# Patient Record
Sex: MALE | Race: WHITE | Employment: OTHER | URBAN - METROPOLITAN AREA
[De-identification: names, ages, dates, MRNs, and addresses within clinical notes are randomized per-mention and may not be internally consistent; named-entity substitution may affect disease eponyms.]

---

## 2017-01-04 PROBLEM — R06.09 DYSPNEA ON EXERTION: Status: ACTIVE | Noted: 2017-01-04

## 2017-01-04 PROBLEM — I31.39 PERICARDIAL EFFUSION: Status: ACTIVE | Noted: 2017-01-04

## 2017-01-04 PROBLEM — J96.01 ACUTE RESPIRATORY FAILURE WITH HYPOXIA (HCC): Status: ACTIVE | Noted: 2017-01-04

## 2017-03-13 PROBLEM — J96.01 ACUTE RESPIRATORY FAILURE WITH HYPOXIA (HCC): Status: RESOLVED | Noted: 2017-01-04 | Resolved: 2017-03-13

## 2017-03-13 PROBLEM — Z79.899 ENCOUNTER FOR MONITORING SOTALOL THERAPY: Status: ACTIVE | Noted: 2017-03-13

## 2017-03-13 PROBLEM — Z51.81 ENCOUNTER FOR MONITORING SOTALOL THERAPY: Status: ACTIVE | Noted: 2017-03-13

## 2017-03-14 PROBLEM — R94.31 PROLONGED QT INTERVAL: Status: ACTIVE | Noted: 2017-03-14

## 2018-02-26 PROBLEM — R78.81 BACTEREMIA: Status: ACTIVE | Noted: 2018-02-26

## 2018-02-27 PROBLEM — I48.19 PERSISTENT ATRIAL FIBRILLATION (HCC): Status: ACTIVE | Noted: 2018-02-27

## 2018-02-27 PROBLEM — I10 ESSENTIAL HYPERTENSION: Chronic | Status: ACTIVE | Noted: 2018-02-27

## 2018-02-27 PROBLEM — R94.31 PROLONGED QT INTERVAL: Status: RESOLVED | Noted: 2017-03-14 | Resolved: 2018-02-27

## 2018-02-27 PROBLEM — I50.32 CHRONIC DIASTOLIC CONGESTIVE HEART FAILURE (HCC): Chronic | Status: ACTIVE | Noted: 2018-02-27

## 2018-02-27 PROBLEM — I48.0 PAROXYSMAL ATRIAL FIBRILLATION (HCC): Chronic | Status: ACTIVE | Noted: 2018-02-27

## 2018-02-27 PROBLEM — I31.39 PERICARDIAL EFFUSION: Status: RESOLVED | Noted: 2017-01-04 | Resolved: 2018-02-27

## 2018-02-27 PROBLEM — I49.5 SINUS NODE DYSFUNCTION (HCC): Status: ACTIVE | Noted: 2018-02-27

## 2018-02-27 PROBLEM — Z79.899 ENCOUNTER FOR MONITORING SOTALOL THERAPY: Status: RESOLVED | Noted: 2017-03-13 | Resolved: 2018-02-27

## 2018-02-27 PROBLEM — Z51.81 ENCOUNTER FOR MONITORING SOTALOL THERAPY: Status: RESOLVED | Noted: 2017-03-13 | Resolved: 2018-02-27

## 2018-02-27 PROBLEM — I34.0 MITRAL VALVE REGURGITATION: Status: ACTIVE | Noted: 2018-02-27

## 2018-03-20 ENCOUNTER — OFFICE VISIT (OUTPATIENT)
Dept: CARDIOTHORACIC SURGERY | Age: 78
End: 2018-03-20

## 2018-03-20 VITALS
SYSTOLIC BLOOD PRESSURE: 132 MMHG | HEIGHT: 73 IN | WEIGHT: 174 LBS | BODY MASS INDEX: 23.06 KG/M2 | HEART RATE: 61 BPM | DIASTOLIC BLOOD PRESSURE: 79 MMHG

## 2018-03-20 DIAGNOSIS — R78.81 BACTEREMIA: Primary | ICD-10-CM

## 2018-03-20 PROCEDURE — 99024 POSTOP FOLLOW-UP VISIT: CPT | Performed by: PHYSICIAN ASSISTANT

## 2018-05-02 ENCOUNTER — TELEPHONE (OUTPATIENT)
Dept: CARDIOTHORACIC SURGERY | Age: 78
End: 2018-05-02

## 2019-09-03 RX ORDER — SILDENAFIL 100 MG/1
100 TABLET, FILM COATED ORAL PRN
COMMUNITY
End: 2019-10-24

## 2019-09-13 ENCOUNTER — HOSPITAL ENCOUNTER (OUTPATIENT)
Age: 79
Discharge: HOME | End: 2019-09-15
Payer: COMMERCIAL

## 2019-09-13 ENCOUNTER — OFFICE VISIT (OUTPATIENT)
Dept: FAMILY MEDICINE CLINIC | Age: 79
End: 2019-09-13
Payer: COMMERCIAL

## 2019-09-13 VITALS
DIASTOLIC BLOOD PRESSURE: 68 MMHG | HEART RATE: 66 BPM | BODY MASS INDEX: 24.12 KG/M2 | HEIGHT: 73 IN | WEIGHT: 182 LBS | OXYGEN SATURATION: 97 % | SYSTOLIC BLOOD PRESSURE: 118 MMHG

## 2019-09-13 DIAGNOSIS — K80.50 PANCREATITIS DUE TO COMMON BILE DUCT STONE: ICD-10-CM

## 2019-09-13 DIAGNOSIS — Z95.0 PACEMAKER: ICD-10-CM

## 2019-09-13 DIAGNOSIS — I49.5 SINUS NODE DYSFUNCTION (HCC): ICD-10-CM

## 2019-09-13 DIAGNOSIS — I48.19 PERSISTENT ATRIAL FIBRILLATION (HCC): Primary | ICD-10-CM

## 2019-09-13 DIAGNOSIS — I10 ESSENTIAL HYPERTENSION: ICD-10-CM

## 2019-09-13 DIAGNOSIS — E78.49 OTHER HYPERLIPIDEMIA: ICD-10-CM

## 2019-09-13 DIAGNOSIS — K85.90 PANCREATITIS DUE TO COMMON BILE DUCT STONE: ICD-10-CM

## 2019-09-13 DIAGNOSIS — R97.20 PSA ELEVATION: ICD-10-CM

## 2019-09-13 DIAGNOSIS — I50.32 CHRONIC DIASTOLIC HEART FAILURE (HCC): ICD-10-CM

## 2019-09-13 LAB
ALBUMIN SERPL-MCNC: 3.9 G/DL (ref 3.5–5.2)
ALP BLD-CCNC: 341 U/L (ref 40–129)
ALT SERPL-CCNC: 30 U/L (ref 0–40)
AMYLASE: 74 U/L (ref 20–100)
ANION GAP SERPL CALCULATED.3IONS-SCNC: 12 MMOL/L (ref 7–16)
AST SERPL-CCNC: 33 U/L (ref 0–39)
BASOPHILS ABSOLUTE: 0.02 E9/L (ref 0–0.2)
BASOPHILS RELATIVE PERCENT: 0.4 % (ref 0–2)
BILIRUB SERPL-MCNC: 1.3 MG/DL (ref 0–1.2)
BUN BLDV-MCNC: 10 MG/DL (ref 8–23)
CALCIUM SERPL-MCNC: 9.2 MG/DL (ref 8.6–10.2)
CHLORIDE BLD-SCNC: 101 MMOL/L (ref 98–107)
CHOLESTEROL, TOTAL: 192 MG/DL (ref 0–199)
CO2: 28 MMOL/L (ref 22–29)
CREAT SERPL-MCNC: 0.9 MG/DL (ref 0.7–1.2)
EOSINOPHILS ABSOLUTE: 0.11 E9/L (ref 0.05–0.5)
EOSINOPHILS RELATIVE PERCENT: 2.2 % (ref 0–6)
GFR AFRICAN AMERICAN: >60
GFR NON-AFRICAN AMERICAN: >60 ML/MIN/1.73
GLUCOSE BLD-MCNC: 94 MG/DL (ref 74–99)
HBA1C MFR BLD: 4.9 % (ref 4–5.6)
HCT VFR BLD CALC: 40.6 % (ref 37–54)
HDLC SERPL-MCNC: 44 MG/DL
HEMOGLOBIN: 12.5 G/DL (ref 12.5–16.5)
IMMATURE GRANULOCYTES #: 0.01 E9/L
IMMATURE GRANULOCYTES %: 0.2 % (ref 0–5)
LDL CHOLESTEROL CALCULATED: 127 MG/DL (ref 0–99)
LYMPHOCYTES ABSOLUTE: 1.11 E9/L (ref 1.5–4)
LYMPHOCYTES RELATIVE PERCENT: 22.5 % (ref 20–42)
MCH RBC QN AUTO: 27.7 PG (ref 26–35)
MCHC RBC AUTO-ENTMCNC: 30.8 % (ref 32–34.5)
MCV RBC AUTO: 90 FL (ref 80–99.9)
MONOCYTES ABSOLUTE: 0.41 E9/L (ref 0.1–0.95)
MONOCYTES RELATIVE PERCENT: 8.3 % (ref 2–12)
NEUTROPHILS ABSOLUTE: 3.27 E9/L (ref 1.8–7.3)
NEUTROPHILS RELATIVE PERCENT: 66.4 % (ref 43–80)
PDW BLD-RTO: 14.8 FL (ref 11.5–15)
PLATELET # BLD: 174 E9/L (ref 130–450)
PMV BLD AUTO: 9.9 FL (ref 7–12)
POTASSIUM SERPL-SCNC: 4.1 MMOL/L (ref 3.5–5)
PROSTATE SPECIFIC ANTIGEN: 7.81 NG/ML (ref 0–4)
RBC # BLD: 4.51 E12/L (ref 3.8–5.8)
SODIUM BLD-SCNC: 141 MMOL/L (ref 132–146)
TOTAL PROTEIN: 7.9 G/DL (ref 6.4–8.3)
TRIGL SERPL-MCNC: 103 MG/DL (ref 0–149)
TSH SERPL DL<=0.05 MIU/L-ACNC: 2.99 UIU/ML (ref 0.27–4.2)
VLDLC SERPL CALC-MCNC: 21 MG/DL
WBC # BLD: 4.9 E9/L (ref 4.5–11.5)

## 2019-09-13 PROCEDURE — 84443 ASSAY THYROID STIM HORMONE: CPT

## 2019-09-13 PROCEDURE — 99214 OFFICE O/P EST MOD 30 MIN: CPT | Performed by: FAMILY MEDICINE

## 2019-09-13 PROCEDURE — 83036 HEMOGLOBIN GLYCOSYLATED A1C: CPT

## 2019-09-13 PROCEDURE — 85025 COMPLETE CBC W/AUTO DIFF WBC: CPT

## 2019-09-13 PROCEDURE — 82150 ASSAY OF AMYLASE: CPT

## 2019-09-13 PROCEDURE — 80053 COMPREHEN METABOLIC PANEL: CPT

## 2019-09-13 PROCEDURE — 36415 COLL VENOUS BLD VENIPUNCTURE: CPT

## 2019-09-13 PROCEDURE — 80061 LIPID PANEL: CPT

## 2019-09-13 PROCEDURE — 84153 ASSAY OF PSA TOTAL: CPT

## 2019-09-13 RX ORDER — SOTALOL HYDROCHLORIDE 80 MG/1
80 TABLET ORAL 2 TIMES DAILY
Qty: 180 TABLET | Refills: 1 | Status: SHIPPED
Start: 2019-09-13 | End: 2020-03-12 | Stop reason: SDUPTHER

## 2019-09-13 RX ORDER — URSODIOL 300 MG/1
300 CAPSULE ORAL 2 TIMES DAILY
Qty: 180 CAPSULE | Refills: 1 | Status: SHIPPED
Start: 2019-09-13 | End: 2020-03-19 | Stop reason: SDUPTHER

## 2019-09-13 RX ORDER — AMLODIPINE BESYLATE AND BENAZEPRIL HYDROCHLORIDE 10; 20 MG/1; MG/1
1 CAPSULE ORAL DAILY
Qty: 90 CAPSULE | Refills: 1 | Status: SHIPPED
Start: 2019-09-13 | End: 2020-03-19 | Stop reason: SDUPTHER

## 2019-09-13 ASSESSMENT — ENCOUNTER SYMPTOMS
PHOTOPHOBIA: 0
COUGH: 0
EYE REDNESS: 0
WHEEZING: 0
ABDOMINAL PAIN: 0
CONSTIPATION: 0
DIARRHEA: 0
TROUBLE SWALLOWING: 0
SHORTNESS OF BREATH: 0
BLOOD IN STOOL: 0
VOMITING: 0
BACK PAIN: 1
SORE THROAT: 0
SINUS PAIN: 0

## 2019-09-13 ASSESSMENT — PATIENT HEALTH QUESTIONNAIRE - PHQ9
SUM OF ALL RESPONSES TO PHQ9 QUESTIONS 1 & 2: 0
2. FEELING DOWN, DEPRESSED OR HOPELESS: 0
SUM OF ALL RESPONSES TO PHQ QUESTIONS 1-9: 0
SUM OF ALL RESPONSES TO PHQ QUESTIONS 1-9: 0
1. LITTLE INTEREST OR PLEASURE IN DOING THINGS: 0

## 2019-09-13 NOTE — PROGRESS NOTES
OFFICE NOTE    9/13/19  Name: Steven Skinner  NXV:[id number]   Sex:male   Age:79 y.o. SUBJECTIVE  Chief Complaint   Patient presents with    Hypertension     6 month check up       HPI comes in for checkup and refills. Overall says he feels he is doiing pretty well this year. Review of Systems   Constitutional: Negative for appetite change, fatigue, fever and unexpected weight change. HENT: Negative for congestion, ear pain, hearing loss, sinus pain, sore throat and trouble swallowing. Eyes: Negative for photophobia, redness and visual disturbance. Respiratory: Negative for cough, shortness of breath and wheezing. Cardiovascular: Positive for palpitations. Negative for chest pain and leg swelling. Gastrointestinal: Negative for abdominal pain, blood in stool, constipation, diarrhea and vomiting. Endocrine: Negative for cold intolerance, polydipsia and polyuria. Genitourinary: Negative for difficulty urinating, genital sores, hematuria and urgency. Musculoskeletal: Positive for arthralgias and back pain. Negative for joint swelling. Skin: Negative for rash and wound. Allergic/Immunologic: Negative for food allergies. Neurological: Negative for dizziness, tremors, syncope and headaches. Hematological: Negative for adenopathy. Does not bruise/bleed easily. Psychiatric/Behavioral: Negative for agitation, dysphoric mood, hallucinations and sleep disturbance.             Current Outpatient Medications:     sotalol (BETAPACE) 80 MG tablet, Take 1 tablet by mouth 2 times daily, Disp: 180 tablet, Rfl: 1    apixaban (ELIQUIS) 5 MG TABS tablet, Take 1 tablet by mouth 2 times daily, Disp: 60 tablet, Rfl: 5    ursodiol (ACTIGALL) 300 MG capsule, Take 1 capsule by mouth 2 times daily, Disp: 180 capsule, Rfl: 1    amLODIPine-benazepril (LOTREL) 10-20 MG per capsule, Take 1 capsule by mouth daily, Disp: 90 capsule, Rfl: 1    sildenafil (VIAGRA) 100 MG tablet, Take 100 mg by mouth as needed for

## 2019-09-16 ENCOUNTER — TELEPHONE (OUTPATIENT)
Dept: FAMILY MEDICINE CLINIC | Age: 79
End: 2019-09-16

## 2019-10-22 ENCOUNTER — TELEPHONE (OUTPATIENT)
Dept: FAMILY MEDICINE CLINIC | Age: 79
End: 2019-10-22

## 2019-10-24 ENCOUNTER — HOSPITAL ENCOUNTER (OUTPATIENT)
Age: 79
Discharge: HOME | End: 2019-10-26
Payer: COMMERCIAL

## 2019-10-24 ENCOUNTER — OFFICE VISIT (OUTPATIENT)
Dept: FAMILY MEDICINE CLINIC | Age: 79
End: 2019-10-24
Payer: COMMERCIAL

## 2019-10-24 VITALS
WEIGHT: 180 LBS | OXYGEN SATURATION: 96 % | HEART RATE: 65 BPM | SYSTOLIC BLOOD PRESSURE: 112 MMHG | TEMPERATURE: 97.5 F | HEIGHT: 73 IN | DIASTOLIC BLOOD PRESSURE: 56 MMHG | BODY MASS INDEX: 23.86 KG/M2

## 2019-10-24 DIAGNOSIS — R10.11 RIGHT UPPER QUADRANT ABDOMINAL PAIN: ICD-10-CM

## 2019-10-24 DIAGNOSIS — Z95.0 PACEMAKER: Primary | ICD-10-CM

## 2019-10-24 DIAGNOSIS — I10 ESSENTIAL HYPERTENSION: Chronic | ICD-10-CM

## 2019-10-24 LAB
ALBUMIN SERPL-MCNC: 2.8 G/DL (ref 3.5–5.2)
ALP BLD-CCNC: 318 U/L (ref 40–129)
ALT SERPL-CCNC: 49 U/L (ref 0–40)
AMYLASE: 32 U/L (ref 20–100)
AST SERPL-CCNC: 48 U/L (ref 0–39)
BASOPHILS ABSOLUTE: 0.01 E9/L (ref 0–0.2)
BASOPHILS RELATIVE PERCENT: 0.1 % (ref 0–2)
BILIRUB SERPL-MCNC: 1.7 MG/DL (ref 0–1.2)
BILIRUBIN DIRECT: 1 MG/DL (ref 0–0.3)
BILIRUBIN, INDIRECT: 0.7 MG/DL (ref 0–1)
EOSINOPHILS ABSOLUTE: 0.01 E9/L (ref 0.05–0.5)
EOSINOPHILS RELATIVE PERCENT: 0.1 % (ref 0–6)
HCT VFR BLD CALC: 39.7 % (ref 37–54)
HEMOGLOBIN: 12.2 G/DL (ref 12.5–16.5)
IMMATURE GRANULOCYTES #: 0.04 E9/L
IMMATURE GRANULOCYTES %: 0.6 % (ref 0–5)
LYMPHOCYTES ABSOLUTE: 0.86 E9/L (ref 1.5–4)
LYMPHOCYTES RELATIVE PERCENT: 12 % (ref 20–42)
MCH RBC QN AUTO: 27.7 PG (ref 26–35)
MCHC RBC AUTO-ENTMCNC: 30.7 % (ref 32–34.5)
MCV RBC AUTO: 90 FL (ref 80–99.9)
MONOCYTES ABSOLUTE: 0.61 E9/L (ref 0.1–0.95)
MONOCYTES RELATIVE PERCENT: 8.5 % (ref 2–12)
NEUTROPHILS ABSOLUTE: 5.61 E9/L (ref 1.8–7.3)
NEUTROPHILS RELATIVE PERCENT: 78.7 % (ref 43–80)
PDW BLD-RTO: 14.7 FL (ref 11.5–15)
PLATELET # BLD: 255 E9/L (ref 130–450)
PMV BLD AUTO: 10.9 FL (ref 7–12)
RBC # BLD: 4.41 E12/L (ref 3.8–5.8)
TOTAL PROTEIN: 7.7 G/DL (ref 6.4–8.3)
WBC # BLD: 7.1 E9/L (ref 4.5–11.5)

## 2019-10-24 PROCEDURE — 85025 COMPLETE CBC W/AUTO DIFF WBC: CPT

## 2019-10-24 PROCEDURE — 82150 ASSAY OF AMYLASE: CPT

## 2019-10-24 PROCEDURE — 80076 HEPATIC FUNCTION PANEL: CPT

## 2019-10-24 PROCEDURE — 36415 COLL VENOUS BLD VENIPUNCTURE: CPT

## 2019-10-24 PROCEDURE — 99214 OFFICE O/P EST MOD 30 MIN: CPT | Performed by: FAMILY MEDICINE

## 2019-10-24 RX ORDER — AMOXICILLIN AND CLAVULANATE POTASSIUM 875; 125 MG/1; MG/1
1 TABLET, FILM COATED ORAL 2 TIMES DAILY
Qty: 14 TABLET | Refills: 0 | Status: SHIPPED | OUTPATIENT
Start: 2019-10-24 | End: 2019-10-31

## 2019-10-24 ASSESSMENT — ENCOUNTER SYMPTOMS
BLOOD IN STOOL: 0
SHORTNESS OF BREATH: 0
VOMITING: 0
SINUS PAIN: 0
PHOTOPHOBIA: 0
TROUBLE SWALLOWING: 0
WHEEZING: 0
BACK PAIN: 0
COUGH: 0
SORE THROAT: 0
ABDOMINAL PAIN: 0
CONSTIPATION: 0
DIARRHEA: 0
EYE REDNESS: 0

## 2019-10-25 ENCOUNTER — TELEPHONE (OUTPATIENT)
Dept: FAMILY MEDICINE CLINIC | Age: 79
End: 2019-10-25

## 2019-10-25 DIAGNOSIS — R10.11 RIGHT UPPER QUADRANT ABDOMINAL PAIN: Primary | ICD-10-CM

## 2019-11-05 ENCOUNTER — TELEPHONE (OUTPATIENT)
Dept: FAMILY MEDICINE CLINIC | Age: 79
End: 2019-11-05

## 2020-03-12 RX ORDER — SOTALOL HYDROCHLORIDE 80 MG/1
80 TABLET ORAL 2 TIMES DAILY
Qty: 180 TABLET | Refills: 1 | Status: SHIPPED
Start: 2020-03-12 | End: 2020-09-24 | Stop reason: SDUPTHER

## 2020-03-19 ENCOUNTER — OFFICE VISIT (OUTPATIENT)
Dept: FAMILY MEDICINE CLINIC | Age: 80
End: 2020-03-19
Payer: MEDICARE

## 2020-03-19 ENCOUNTER — HOSPITAL ENCOUNTER (OUTPATIENT)
Age: 80
Discharge: HOME | End: 2020-03-21
Payer: MEDICARE

## 2020-03-19 VITALS
WEIGHT: 183.6 LBS | SYSTOLIC BLOOD PRESSURE: 138 MMHG | TEMPERATURE: 97.8 F | OXYGEN SATURATION: 97 % | BODY MASS INDEX: 24.22 KG/M2 | DIASTOLIC BLOOD PRESSURE: 72 MMHG | HEART RATE: 60 BPM

## 2020-03-19 PROBLEM — R78.81 BACTEREMIA DUE TO GRAM-NEGATIVE BACTERIA: Status: RESOLVED | Noted: 2018-02-26 | Resolved: 2020-03-19

## 2020-03-19 LAB
ALBUMIN SERPL-MCNC: 3.4 G/DL (ref 3.5–5.2)
ALP BLD-CCNC: 324 U/L (ref 40–129)
ALT SERPL-CCNC: 39 U/L (ref 0–40)
ANION GAP SERPL CALCULATED.3IONS-SCNC: 10 MMOL/L (ref 7–16)
AST SERPL-CCNC: 40 U/L (ref 0–39)
BASOPHILS ABSOLUTE: 0.02 E9/L (ref 0–0.2)
BASOPHILS RELATIVE PERCENT: 0.5 % (ref 0–2)
BILIRUB SERPL-MCNC: 0.9 MG/DL (ref 0–1.2)
BUN BLDV-MCNC: 14 MG/DL (ref 8–23)
CALCIUM SERPL-MCNC: 9.2 MG/DL (ref 8.6–10.2)
CHLORIDE BLD-SCNC: 102 MMOL/L (ref 98–107)
CO2: 28 MMOL/L (ref 22–29)
CREAT SERPL-MCNC: 1 MG/DL (ref 0.7–1.2)
EOSINOPHILS ABSOLUTE: 0.1 E9/L (ref 0.05–0.5)
EOSINOPHILS RELATIVE PERCENT: 2.6 % (ref 0–6)
GFR AFRICAN AMERICAN: >60
GFR NON-AFRICAN AMERICAN: >60 ML/MIN/1.73
GLUCOSE BLD-MCNC: 91 MG/DL (ref 74–99)
HCT VFR BLD CALC: 39.6 % (ref 37–54)
HEMOGLOBIN: 12.1 G/DL (ref 12.5–16.5)
IMMATURE GRANULOCYTES #: 0.01 E9/L
IMMATURE GRANULOCYTES %: 0.3 % (ref 0–5)
LYMPHOCYTES ABSOLUTE: 0.8 E9/L (ref 1.5–4)
LYMPHOCYTES RELATIVE PERCENT: 20.7 % (ref 20–42)
MCH RBC QN AUTO: 27.8 PG (ref 26–35)
MCHC RBC AUTO-ENTMCNC: 30.6 % (ref 32–34.5)
MCV RBC AUTO: 91 FL (ref 80–99.9)
MONOCYTES ABSOLUTE: 0.31 E9/L (ref 0.1–0.95)
MONOCYTES RELATIVE PERCENT: 8 % (ref 2–12)
NEUTROPHILS ABSOLUTE: 2.63 E9/L (ref 1.8–7.3)
NEUTROPHILS RELATIVE PERCENT: 67.9 % (ref 43–80)
PDW BLD-RTO: 14.5 FL (ref 11.5–15)
PLATELET # BLD: 164 E9/L (ref 130–450)
PMV BLD AUTO: 10.9 FL (ref 7–12)
POTASSIUM SERPL-SCNC: 4.4 MMOL/L (ref 3.5–5)
RBC # BLD: 4.35 E12/L (ref 3.8–5.8)
SODIUM BLD-SCNC: 140 MMOL/L (ref 132–146)
TOTAL PROTEIN: 7.5 G/DL (ref 6.4–8.3)
WBC # BLD: 3.9 E9/L (ref 4.5–11.5)

## 2020-03-19 PROCEDURE — 84153 ASSAY OF PSA TOTAL: CPT

## 2020-03-19 PROCEDURE — 84154 ASSAY OF PSA FREE: CPT

## 2020-03-19 PROCEDURE — 85025 COMPLETE CBC W/AUTO DIFF WBC: CPT

## 2020-03-19 PROCEDURE — 93000 ELECTROCARDIOGRAM COMPLETE: CPT | Performed by: FAMILY MEDICINE

## 2020-03-19 PROCEDURE — G8510 SCR DEP NEG, NO PLAN REQD: HCPCS | Performed by: FAMILY MEDICINE

## 2020-03-19 PROCEDURE — 36415 COLL VENOUS BLD VENIPUNCTURE: CPT

## 2020-03-19 PROCEDURE — 80053 COMPREHEN METABOLIC PANEL: CPT

## 2020-03-19 PROCEDURE — 3288F FALL RISK ASSESSMENT DOCD: CPT | Performed by: FAMILY MEDICINE

## 2020-03-19 PROCEDURE — 99214 OFFICE O/P EST MOD 30 MIN: CPT | Performed by: FAMILY MEDICINE

## 2020-03-19 RX ORDER — AMLODIPINE BESYLATE AND BENAZEPRIL HYDROCHLORIDE 10; 20 MG/1; MG/1
1 CAPSULE ORAL DAILY
Qty: 90 CAPSULE | Refills: 1 | Status: SHIPPED
Start: 2020-03-19 | End: 2020-09-24 | Stop reason: SDUPTHER

## 2020-03-19 RX ORDER — URSODIOL 300 MG/1
300 CAPSULE ORAL 2 TIMES DAILY
Qty: 180 CAPSULE | Refills: 1 | Status: SHIPPED
Start: 2020-03-19 | End: 2020-09-24 | Stop reason: SDUPTHER

## 2020-03-19 ASSESSMENT — ENCOUNTER SYMPTOMS
BACK PAIN: 0
WHEEZING: 0
TROUBLE SWALLOWING: 0
CONSTIPATION: 0
COUGH: 0
EYE REDNESS: 0
VOMITING: 0
SHORTNESS OF BREATH: 0
DIARRHEA: 0
ABDOMINAL PAIN: 0
PHOTOPHOBIA: 0
SINUS PAIN: 0
BLOOD IN STOOL: 0
SORE THROAT: 0

## 2020-03-19 ASSESSMENT — PATIENT HEALTH QUESTIONNAIRE - PHQ9
SUM OF ALL RESPONSES TO PHQ QUESTIONS 1-9: 0
1. LITTLE INTEREST OR PLEASURE IN DOING THINGS: 0
SUM OF ALL RESPONSES TO PHQ9 QUESTIONS 1 & 2: 0
2. FEELING DOWN, DEPRESSED OR HOPELESS: 0
SUM OF ALL RESPONSES TO PHQ QUESTIONS 1-9: 0

## 2020-03-19 NOTE — PROGRESS NOTES
OFFICE NOTE    3/19/20  Name: Caesar Hester  SW   Sex:male   Age:79 y.o. SUBJECTIVE  Chief Complaint   Patient presents with    Hypertension    Atrial Fibrillation       Pt presents for routine follow up. Requires routine medication refills. UTD on vaccinations. Denies new s/s or complaints. Says he tires easily. Changed oil and did some work on [employer] car. No post-prandial pain or weight loss           Review of Systems   Constitutional: Negative for appetite change, fever and unexpected weight change. HENT: Negative for congestion, ear pain, hearing loss, sinus pain, sore throat and trouble swallowing. Eyes: Negative for photophobia, redness and visual disturbance. Respiratory: Negative for cough, shortness of breath and wheezing. Tires easily, no anderson JIANG   Cardiovascular: Negative for chest pain, palpitations and leg swelling. Gastrointestinal: Negative for abdominal pain, blood in stool, constipation, diarrhea and vomiting. No jaundice bilirubinuria or acholic stools   Endocrine: Negative for cold intolerance, polydipsia and polyuria. Genitourinary: Negative for difficulty urinating, genital sores, hematuria and urgency. Musculoskeletal: Positive for arthralgias. Negative for back pain and joint swelling. Allergic/Immunologic: Negative for food allergies. Neurological: Negative for dizziness, tremors, syncope and headaches. Hematological: Negative for adenopathy. Does not bruise/bleed easily. Psychiatric/Behavioral: Negative for agitation, dysphoric mood, hallucinations and sleep disturbance.             Current Outpatient Medications:     ursodiol (ACTIGALL) 300 MG capsule, Take 1 capsule by mouth 2 times daily, Disp: 180 capsule, Rfl: 1    apixaban (ELIQUIS) 5 MG TABS tablet, Take 1 tablet by mouth 2 times daily, Disp: 60 tablet, Rfl: 5    amLODIPine-benazepril (LOTREL) 10-20 MG per capsule, Take 1 capsule by mouth daily, Disp: 90 capsule, Rfl: 1   Future     Number of Occurrences:   1     Standing Expiration Date:   3/19/2021    EKG 12 Lead     Standing Status:   Future     Number of Occurrences:   1     Standing Expiration Date:   3/19/2021     Order Specific Question:   Reason for Exam?     Answer:   Irregular heart rate        EXAM   Physical Exam  Vitals signs and nursing note reviewed. Constitutional:       Appearance: He is well-developed. HENT:      Right Ear: Tympanic membrane, ear canal and external ear normal.      Left Ear: Tympanic membrane, ear canal and external ear normal.      Nose: Nose normal.      Mouth/Throat:      Pharynx: Oropharynx is clear. Eyes:      General: No scleral icterus. Conjunctiva/sclera: Conjunctivae normal.      Pupils: Pupils are equal, round, and reactive to light. Neck:      Musculoskeletal: Normal range of motion and neck supple. Thyroid: No thyroid mass or thyromegaly. Vascular: No carotid bruit or JVD. Trachea: Trachea normal.   Cardiovascular:      Rate and Rhythm: Normal rate and regular rhythm. Pulses: Normal pulses. Heart sounds: Normal heart sounds. No murmur. No gallop. Pulmonary:      Effort: Pulmonary effort is normal.      Breath sounds: Normal breath sounds. No wheezing, rhonchi or rales. Abdominal:      General: Bowel sounds are normal. There is no distension. Palpations: Abdomen is soft. There is no mass. Tenderness: There is no abdominal tenderness. There is no right CVA tenderness, left CVA tenderness or guarding. Hernia: No hernia is present. Musculoskeletal: Normal range of motion. General: No swelling or tenderness. Right lower leg: No edema. Left lower leg: No edema. Comments: Moderate OA knees which are slightly varus   Lymphadenopathy:      Cervical: No cervical adenopathy. Skin:     General: Skin is warm and dry. Coloration: Skin is not jaundiced. Findings: No bruising or rash.    Neurological:

## 2020-03-23 LAB
PROSTATE SPECIFIC ANTIGEN FREE: 0.9 NG/ML
PROSTATE SPECIFIC ANTIGEN PERCENT FREE: 11 %
PROSTATE SPECIFIC ANTIGEN: 8.2 NG/ML (ref 0–4)

## 2020-03-24 ENCOUNTER — TELEPHONE (OUTPATIENT)
Dept: FAMILY MEDICINE CLINIC | Age: 80
End: 2020-03-24

## 2020-03-24 NOTE — TELEPHONE ENCOUNTER
----- Message from Shelby Aleman MA sent at 3/24/2020  8:27 AM EDT -----  Pt informed and would like to see Dr. Sarbjit Zelaya.  Pt's wife states they saw Dr. Bouchra Morris in the past and they do not want to see him again

## 2020-04-08 ENCOUNTER — OFFICE VISIT (OUTPATIENT)
Dept: FAMILY MEDICINE CLINIC | Age: 80
End: 2020-04-08
Payer: MEDICARE

## 2020-04-08 VITALS
OXYGEN SATURATION: 92 % | TEMPERATURE: 97.6 F | HEART RATE: 81 BPM | DIASTOLIC BLOOD PRESSURE: 82 MMHG | SYSTOLIC BLOOD PRESSURE: 120 MMHG

## 2020-04-08 PROCEDURE — 99214 OFFICE O/P EST MOD 30 MIN: CPT | Performed by: PHYSICIAN ASSISTANT

## 2020-04-08 ASSESSMENT — ENCOUNTER SYMPTOMS
ABDOMINAL PAIN: 0
SHORTNESS OF BREATH: 0
COUGH: 0
BACK PAIN: 0
PHOTOPHOBIA: 0
SORE THROAT: 0
DIARRHEA: 0
VOMITING: 0
NAUSEA: 0

## 2020-04-08 NOTE — PROGRESS NOTES
Gallstone pancreatitis     H/O esophagogastroduodenoscopy 02/2013    dr Sivan Robison no Hpylori, or Howell's     H/O ETOH abuse     sober for several years    Hypertension     Pacemaker        Past Surgical History:   Procedure Laterality Date    CATARACT REMOVAL Bilateral     CHOLECYSTECTOMY      COLONOSCOPY  06/11/2016    additional dates: 2003,02/28/2013    ERCP      and ston removal 03/2007    OTHER SURGICAL HISTORY      cardioversion    PACEMAKER PLACEMENT      LASER LEAD EXTRACTION  03/02/2018       Family History   Problem Relation Age of Onset    Hypertension Father     Heart Disease Father     Emphysema Father     Coronary Art Dis Mother     Osteoporosis Mother        Medications:     Current Outpatient Medications:     ursodiol (ACTIGALL) 300 MG capsule, Take 1 capsule by mouth 2 times daily, Disp: 180 capsule, Rfl: 1    apixaban (ELIQUIS) 5 MG TABS tablet, Take 1 tablet by mouth 2 times daily, Disp: 60 tablet, Rfl: 5    amLODIPine-benazepril (LOTREL) 10-20 MG per capsule, Take 1 capsule by mouth daily, Disp: 90 capsule, Rfl: 1    sotalol (BETAPACE) 80 MG tablet, Take 1 tablet by mouth 2 times daily, Disp: 180 tablet, Rfl: 1    Multiple Vitamins-Minerals (THERAPEUTIC MULTIVITAMIN-MINERALS) tablet, Take 1 tablet by mouth daily, Disp: , Rfl:     Allergies: Allergies   Allergen Reactions    Flecainide Dermatitis    Zoloft [Sertraline]        Social History:     Social History     Tobacco Use    Smoking status: Never Smoker    Smokeless tobacco: Never Used   Substance Use Topics    Alcohol use: No    Drug use: No       Patient lives at home. Physical Exam:     Vitals:    04/08/20 0944   BP: 120/82   Pulse: 81   Temp: 97.6 °F (36.4 °C)   SpO2: 92%       Exam:  Physical Exam  Vitals signs and nursing note reviewed. Constitutional:       General: He is not in acute distress. Appearance: He is well-developed. HENT:      Head: Normocephalic and atraumatic.    Eyes:

## 2020-04-13 ENCOUNTER — TELEPHONE (OUTPATIENT)
Dept: ORTHOPEDIC SURGERY | Age: 80
End: 2020-04-13

## 2020-04-23 ENCOUNTER — HOSPITAL ENCOUNTER (INPATIENT)
Age: 80
LOS: 3 days | Discharge: HOME | DRG: 470 | End: 2020-04-26
Attending: EMERGENCY MEDICINE | Admitting: INTERNAL MEDICINE
Payer: MEDICARE

## 2020-04-23 ENCOUNTER — APPOINTMENT (OUTPATIENT)
Dept: GENERAL RADIOLOGY | Age: 80
DRG: 470 | End: 2020-04-23
Payer: MEDICARE

## 2020-04-23 ENCOUNTER — OFFICE VISIT (OUTPATIENT)
Dept: ORTHOPEDIC SURGERY | Age: 80
DRG: 470 | End: 2020-04-23
Payer: MEDICARE

## 2020-04-23 ENCOUNTER — HOSPITAL ENCOUNTER (OUTPATIENT)
Dept: CT IMAGING | Age: 80
Discharge: HOME | DRG: 470 | End: 2020-04-25
Payer: MEDICARE

## 2020-04-23 ENCOUNTER — HOSPITAL ENCOUNTER (OUTPATIENT)
Dept: GENERAL RADIOLOGY | Age: 80
Discharge: HOME | DRG: 470 | End: 2020-04-25
Payer: MEDICARE

## 2020-04-23 VITALS
SYSTOLIC BLOOD PRESSURE: 154 MMHG | DIASTOLIC BLOOD PRESSURE: 80 MMHG | HEIGHT: 73 IN | HEART RATE: 69 BPM | WEIGHT: 180 LBS | BODY MASS INDEX: 23.86 KG/M2 | TEMPERATURE: 98 F

## 2020-04-23 PROBLEM — S72.002A CLOSED LEFT HIP FRACTURE, INITIAL ENCOUNTER (HCC): Status: ACTIVE | Noted: 2020-04-23

## 2020-04-23 PROBLEM — S72.002A CLOSED DISPLACED FRACTURE OF LEFT FEMORAL NECK (HCC): Status: ACTIVE | Noted: 2020-04-23

## 2020-04-23 LAB
ABO/RH: NORMAL
ALBUMIN SERPL-MCNC: 3.6 G/DL (ref 3.5–5.2)
ALP BLD-CCNC: 376 U/L (ref 40–129)
ALT SERPL-CCNC: 26 U/L (ref 0–40)
ANION GAP SERPL CALCULATED.3IONS-SCNC: 12 MMOL/L (ref 7–16)
ANTIBODY SCREEN: NORMAL
APTT: 43.2 SEC (ref 24.5–35.1)
AST SERPL-CCNC: 32 U/L (ref 0–39)
BASOPHILS ABSOLUTE: 0.03 E9/L (ref 0–0.2)
BASOPHILS RELATIVE PERCENT: 0.7 % (ref 0–2)
BILIRUB SERPL-MCNC: 1.5 MG/DL (ref 0–1.2)
BUN BLDV-MCNC: 12 MG/DL (ref 8–23)
CALCIUM SERPL-MCNC: 9.7 MG/DL (ref 8.6–10.2)
CHLORIDE BLD-SCNC: 100 MMOL/L (ref 98–107)
CO2: 27 MMOL/L (ref 22–29)
CREAT SERPL-MCNC: 0.9 MG/DL (ref 0.7–1.2)
EOSINOPHILS ABSOLUTE: 0.13 E9/L (ref 0.05–0.5)
EOSINOPHILS RELATIVE PERCENT: 3 % (ref 0–6)
GFR AFRICAN AMERICAN: >60
GFR NON-AFRICAN AMERICAN: >60 ML/MIN/1.73
GLUCOSE BLD-MCNC: 87 MG/DL (ref 74–99)
HCT VFR BLD CALC: 40.9 % (ref 37–54)
HEMOGLOBIN: 12.6 G/DL (ref 12.5–16.5)
IMMATURE GRANULOCYTES #: 0.01 E9/L
IMMATURE GRANULOCYTES %: 0.2 % (ref 0–5)
INR BLD: 1.3
LYMPHOCYTES ABSOLUTE: 0.99 E9/L (ref 1.5–4)
LYMPHOCYTES RELATIVE PERCENT: 22.6 % (ref 20–42)
MCH RBC QN AUTO: 27.8 PG (ref 26–35)
MCHC RBC AUTO-ENTMCNC: 30.8 % (ref 32–34.5)
MCV RBC AUTO: 90.1 FL (ref 80–99.9)
MONOCYTES ABSOLUTE: 0.37 E9/L (ref 0.1–0.95)
MONOCYTES RELATIVE PERCENT: 8.4 % (ref 2–12)
NEUTROPHILS ABSOLUTE: 2.85 E9/L (ref 1.8–7.3)
NEUTROPHILS RELATIVE PERCENT: 65.1 % (ref 43–80)
PDW BLD-RTO: 15.3 FL (ref 11.5–15)
PLATELET # BLD: 141 E9/L (ref 130–450)
PMV BLD AUTO: 9 FL (ref 7–12)
POTASSIUM SERPL-SCNC: 4.7 MMOL/L (ref 3.5–5)
PROTHROMBIN TIME: 15 SEC (ref 9.3–12.4)
RBC # BLD: 4.54 E12/L (ref 3.8–5.8)
SODIUM BLD-SCNC: 139 MMOL/L (ref 132–146)
TOTAL PROTEIN: 7.9 G/DL (ref 6.4–8.3)
WBC # BLD: 4.4 E9/L (ref 4.5–11.5)

## 2020-04-23 PROCEDURE — 73700 CT LOWER EXTREMITY W/O DYE: CPT

## 2020-04-23 PROCEDURE — 99285 EMERGENCY DEPT VISIT HI MDM: CPT

## 2020-04-23 PROCEDURE — 86850 RBC ANTIBODY SCREEN: CPT

## 2020-04-23 PROCEDURE — 85025 COMPLETE CBC W/AUTO DIFF WBC: CPT

## 2020-04-23 PROCEDURE — 6370000000 HC RX 637 (ALT 250 FOR IP): Performed by: INTERNAL MEDICINE

## 2020-04-23 PROCEDURE — 85730 THROMBOPLASTIN TIME PARTIAL: CPT

## 2020-04-23 PROCEDURE — 99202 OFFICE O/P NEW SF 15 MIN: CPT | Performed by: PHYSICIAN ASSISTANT

## 2020-04-23 PROCEDURE — 73502 X-RAY EXAM HIP UNI 2-3 VIEWS: CPT

## 2020-04-23 PROCEDURE — 36415 COLL VENOUS BLD VENIPUNCTURE: CPT

## 2020-04-23 PROCEDURE — 80053 COMPREHEN METABOLIC PANEL: CPT

## 2020-04-23 PROCEDURE — 86900 BLOOD TYPING SEROLOGIC ABO: CPT

## 2020-04-23 PROCEDURE — 1200000000 HC SEMI PRIVATE

## 2020-04-23 PROCEDURE — 2580000003 HC RX 258: Performed by: PHYSICIAN ASSISTANT

## 2020-04-23 PROCEDURE — 99222 1ST HOSP IP/OBS MODERATE 55: CPT | Performed by: ORTHOPAEDIC SURGERY

## 2020-04-23 PROCEDURE — 99214 OFFICE O/P EST MOD 30 MIN: CPT | Performed by: PHYSICIAN ASSISTANT

## 2020-04-23 PROCEDURE — 86901 BLOOD TYPING SEROLOGIC RH(D): CPT

## 2020-04-23 PROCEDURE — 85610 PROTHROMBIN TIME: CPT

## 2020-04-23 PROCEDURE — 73562 X-RAY EXAM OF KNEE 3: CPT

## 2020-04-23 PROCEDURE — 71045 X-RAY EXAM CHEST 1 VIEW: CPT

## 2020-04-23 PROCEDURE — 6370000000 HC RX 637 (ALT 250 FOR IP): Performed by: PHYSICIAN ASSISTANT

## 2020-04-23 PROCEDURE — 93005 ELECTROCARDIOGRAM TRACING: CPT | Performed by: ORTHOPAEDIC SURGERY

## 2020-04-23 RX ORDER — LISINOPRIL 20 MG/1
20 TABLET ORAL DAILY
Status: DISCONTINUED | OUTPATIENT
Start: 2020-04-23 | End: 2020-04-26 | Stop reason: HOSPADM

## 2020-04-23 RX ORDER — SODIUM CHLORIDE 0.9 % (FLUSH) 0.9 %
10 SYRINGE (ML) INJECTION EVERY 12 HOURS SCHEDULED
Status: DISCONTINUED | OUTPATIENT
Start: 2020-04-23 | End: 2020-04-24 | Stop reason: SDUPTHER

## 2020-04-23 RX ORDER — AMLODIPINE BESYLATE 10 MG/1
10 TABLET ORAL DAILY
Status: DISCONTINUED | OUTPATIENT
Start: 2020-04-23 | End: 2020-04-26 | Stop reason: HOSPADM

## 2020-04-23 RX ORDER — POTASSIUM CHLORIDE 20 MEQ/1
40 TABLET, EXTENDED RELEASE ORAL PRN
Status: DISCONTINUED | OUTPATIENT
Start: 2020-04-23 | End: 2020-04-26 | Stop reason: HOSPADM

## 2020-04-23 RX ORDER — SODIUM CHLORIDE 0.9 % (FLUSH) 0.9 %
10 SYRINGE (ML) INJECTION PRN
Status: DISCONTINUED | OUTPATIENT
Start: 2020-04-23 | End: 2020-04-24 | Stop reason: SDUPTHER

## 2020-04-23 RX ORDER — MORPHINE SULFATE 2 MG/ML
2 INJECTION, SOLUTION INTRAMUSCULAR; INTRAVENOUS EVERY 4 HOURS PRN
Status: DISCONTINUED | OUTPATIENT
Start: 2020-04-23 | End: 2020-04-26 | Stop reason: HOSPADM

## 2020-04-23 RX ORDER — ACETAMINOPHEN 500 MG
1000 TABLET ORAL EVERY 6 HOURS PRN
Status: ON HOLD | COMMUNITY
End: 2020-04-26 | Stop reason: HOSPADM

## 2020-04-23 RX ORDER — HYDROCODONE BITARTRATE AND ACETAMINOPHEN 5; 325 MG/1; MG/1
1 TABLET ORAL EVERY 6 HOURS PRN
Status: DISCONTINUED | OUTPATIENT
Start: 2020-04-23 | End: 2020-04-24

## 2020-04-23 RX ORDER — ACETAMINOPHEN 650 MG/1
650 SUPPOSITORY RECTAL EVERY 6 HOURS PRN
Status: DISCONTINUED | OUTPATIENT
Start: 2020-04-23 | End: 2020-04-26 | Stop reason: HOSPADM

## 2020-04-23 RX ORDER — ACETAMINOPHEN 325 MG/1
650 TABLET ORAL EVERY 6 HOURS PRN
Status: DISCONTINUED | OUTPATIENT
Start: 2020-04-23 | End: 2020-04-26 | Stop reason: HOSPADM

## 2020-04-23 RX ORDER — AMLODIPINE BESYLATE AND BENAZEPRIL HYDROCHLORIDE 10; 20 MG/1; MG/1
1 CAPSULE ORAL DAILY
Status: DISCONTINUED | OUTPATIENT
Start: 2020-04-23 | End: 2020-04-23 | Stop reason: SDUPTHER

## 2020-04-23 RX ORDER — SOTALOL HYDROCHLORIDE 80 MG/1
40 TABLET ORAL 2 TIMES DAILY
Status: DISCONTINUED | OUTPATIENT
Start: 2020-04-23 | End: 2020-04-26 | Stop reason: HOSPADM

## 2020-04-23 RX ORDER — URSODIOL 300 MG/1
300 CAPSULE ORAL 2 TIMES DAILY
Status: DISCONTINUED | OUTPATIENT
Start: 2020-04-23 | End: 2020-04-26 | Stop reason: HOSPADM

## 2020-04-23 RX ORDER — POTASSIUM CHLORIDE 7.45 MG/ML
10 INJECTION INTRAVENOUS PRN
Status: DISCONTINUED | OUTPATIENT
Start: 2020-04-23 | End: 2020-04-26 | Stop reason: HOSPADM

## 2020-04-23 RX ADMIN — HYDROCODONE BITARTRATE AND ACETAMINOPHEN 1 TABLET: 5; 325 TABLET ORAL at 22:15

## 2020-04-23 RX ADMIN — SOTALOL HYDROCHLORIDE 40 MG: 80 TABLET ORAL at 22:12

## 2020-04-23 RX ADMIN — SODIUM CHLORIDE, PRESERVATIVE FREE 10 ML: 5 INJECTION INTRAVENOUS at 22:12

## 2020-04-23 RX ADMIN — URSODIOL 300 MG: 300 CAPSULE ORAL at 22:12

## 2020-04-23 RX ADMIN — SOTALOL HYDROCHLORIDE 40 MG: 80 TABLET ORAL at 17:17

## 2020-04-23 RX ADMIN — LISINOPRIL 20 MG: 20 TABLET ORAL at 17:17

## 2020-04-23 RX ADMIN — AMLODIPINE BESYLATE 10 MG: 10 TABLET ORAL at 17:17

## 2020-04-23 ASSESSMENT — PAIN DESCRIPTION - LOCATION: LOCATION: HIP

## 2020-04-23 ASSESSMENT — PAIN DESCRIPTION - FREQUENCY: FREQUENCY: INTERMITTENT

## 2020-04-23 ASSESSMENT — PAIN SCALES - GENERAL
PAINLEVEL_OUTOF10: 0
PAINLEVEL_OUTOF10: 0
PAINLEVEL_OUTOF10: 10
PAINLEVEL_OUTOF10: 4
PAINLEVEL_OUTOF10: 2

## 2020-04-23 ASSESSMENT — PAIN DESCRIPTION - ONSET: ONSET: SUDDEN

## 2020-04-23 ASSESSMENT — PAIN DESCRIPTION - DESCRIPTORS
DESCRIPTORS: ACHING;DULL;SORE
DESCRIPTORS: ACHING
DESCRIPTORS: ACHING;DULL;SORE

## 2020-04-23 ASSESSMENT — PAIN DESCRIPTION - ORIENTATION
ORIENTATION: LEFT

## 2020-04-23 ASSESSMENT — PAIN DESCRIPTION - PAIN TYPE: TYPE: ACUTE PAIN

## 2020-04-23 ASSESSMENT — PAIN DESCRIPTION - PROGRESSION: CLINICAL_PROGRESSION: GRADUALLY WORSENING

## 2020-04-23 NOTE — CONSULTS
Department of Orthopedic Surgery  Resident Consult Note          Reason for Consult: Left Hip Pain    HISTORY OF PRESENT ILLNESS:       Patient is a 78 y.o. male who presents with hip pain after a fall. Patient states that he fell 2 weeks ago from standing height. Patient states he was seen at outside facility where x-rays were negative and recommended orthopedic follow-up. Patient states that he was unable to follow-up until today 4/23/2020. Patient was seen in orthopedic office where CT was done demonstrating left femoral neck fracture. Patient was admitted to the hospital for definitive treatment. Patient denies previous left hip pain. Patient is on Eliquis 5 mg twice daily, patient states he took 5 mg dose this morning. Patient denies use of assistive ambulatory device. Patient denies numbness/tingling/paresthesias. Denies any other orthopedic complaints at this time.        Past Medical History:        Diagnosis Date    Arthritis     Knees    Atrial fibrillation ([address]     Cancer (HCC)     skin cancer    CHF (congestive heart failure) (HCC)     Gallstone pancreatitis     H/O esophagogastroduodenoscopy 02/2013    dr Shayy Beltrán no Hpylori, or Howell's     H/O ETOH abuse     sober for several years    Hypertension     MDRO (multiple drug resistant organisms) resistance     Pacemaker      Past Surgical History:        Procedure Laterality Date    ABDOMEN SURGERY      CATARACT REMOVAL Bilateral     CHOLECYSTECTOMY      COLONOSCOPY  06/11/2016    additional dates: 2003,02/28/2013    ENDOSCOPY, COLON, DIAGNOSTIC      ERCP      and ston removal 03/2007    EYE SURGERY Bilateral     cataracts    OTHER SURGICAL HISTORY      cardioversion    PACEMAKER PLACEMENT      LASER LEAD EXTRACTION  03/02/2018    SKIN BIOPSY      TONSILLECTOMY       Current Medications:   Current Facility-Administered Medications: sotalol (BETAPACE) tablet 40 mg, 40 mg, Oral, BID  ursodiol (ACTIGALL) capsule 300 mg, 300 mg,

## 2020-04-23 NOTE — PROGRESS NOTES
4/23/2020 at 11:32 AM  Note: This report was completed using [employer] voiced recognition software. Every effort has been made to ensure accuracy; however, inadvertent computerized transcription errors may be present.

## 2020-04-24 ENCOUNTER — APPOINTMENT (OUTPATIENT)
Dept: GENERAL RADIOLOGY | Age: 80
DRG: 470 | End: 2020-04-24
Payer: MEDICARE

## 2020-04-24 ENCOUNTER — ANESTHESIA (OUTPATIENT)
Dept: OPERATING ROOM | Age: 80
DRG: 470 | End: 2020-04-24
Payer: MEDICARE

## 2020-04-24 ENCOUNTER — ANESTHESIA EVENT (OUTPATIENT)
Dept: OPERATING ROOM | Age: 80
DRG: 470 | End: 2020-04-24
Payer: MEDICARE

## 2020-04-24 VITALS
OXYGEN SATURATION: 94 % | RESPIRATION RATE: 11 BRPM | SYSTOLIC BLOOD PRESSURE: 128 MMHG | DIASTOLIC BLOOD PRESSURE: 70 MMHG

## 2020-04-24 PROBLEM — I48.0 PAROXYSMAL ATRIAL FIBRILLATION (HCC): Status: ACTIVE | Noted: 2018-02-27

## 2020-04-24 PROBLEM — Z79.01 CHRONIC ANTICOAGULATION: Status: ACTIVE | Noted: 2020-04-24

## 2020-04-24 PROBLEM — I48.19 PERSISTENT ATRIAL FIBRILLATION (HCC): Chronic | Status: ACTIVE | Noted: 2018-02-27

## 2020-04-24 PROBLEM — Z79.01 CHRONIC ANTICOAGULATION: Chronic | Status: ACTIVE | Noted: 2020-04-24

## 2020-04-24 LAB
ANION GAP SERPL CALCULATED.3IONS-SCNC: 14 MMOL/L (ref 7–16)
BASOPHILS ABSOLUTE: 0.02 E9/L (ref 0–0.2)
BASOPHILS RELATIVE PERCENT: 0.6 % (ref 0–2)
BUN BLDV-MCNC: 12 MG/DL (ref 8–23)
CALCIUM SERPL-MCNC: 9.2 MG/DL (ref 8.6–10.2)
CHLORIDE BLD-SCNC: 101 MMOL/L (ref 98–107)
CO2: 27 MMOL/L (ref 22–29)
CREAT SERPL-MCNC: 0.9 MG/DL (ref 0.7–1.2)
EOSINOPHILS ABSOLUTE: 0.14 E9/L (ref 0.05–0.5)
EOSINOPHILS RELATIVE PERCENT: 3.9 % (ref 0–6)
GFR AFRICAN AMERICAN: >60
GFR NON-AFRICAN AMERICAN: >60 ML/MIN/1.73
GLUCOSE BLD-MCNC: 79 MG/DL (ref 74–99)
HCT VFR BLD CALC: 37.5 % (ref 37–54)
HEMOGLOBIN: 11.6 G/DL (ref 12.5–16.5)
IMMATURE GRANULOCYTES #: 0.01 E9/L
IMMATURE GRANULOCYTES %: 0.3 % (ref 0–5)
LYMPHOCYTES ABSOLUTE: 0.91 E9/L (ref 1.5–4)
LYMPHOCYTES RELATIVE PERCENT: 25.5 % (ref 20–42)
MCH RBC QN AUTO: 27.6 PG (ref 26–35)
MCHC RBC AUTO-ENTMCNC: 30.9 % (ref 32–34.5)
MCV RBC AUTO: 89.1 FL (ref 80–99.9)
MONOCYTES ABSOLUTE: 0.33 E9/L (ref 0.1–0.95)
MONOCYTES RELATIVE PERCENT: 9.2 % (ref 2–12)
NEUTROPHILS ABSOLUTE: 2.16 E9/L (ref 1.8–7.3)
NEUTROPHILS RELATIVE PERCENT: 60.5 % (ref 43–80)
PDW BLD-RTO: 15 FL (ref 11.5–15)
PLATELET # BLD: 143 E9/L (ref 130–450)
PMV BLD AUTO: 9.6 FL (ref 7–12)
POTASSIUM REFLEX MAGNESIUM: 4.1 MMOL/L (ref 3.5–5)
RBC # BLD: 4.21 E12/L (ref 3.8–5.8)
SODIUM BLD-SCNC: 142 MMOL/L (ref 132–146)
WBC # BLD: 3.6 E9/L (ref 4.5–11.5)

## 2020-04-24 PROCEDURE — 73502 X-RAY EXAM HIP UNI 2-3 VIEWS: CPT

## 2020-04-24 PROCEDURE — 6360000002 HC RX W HCPCS: Performed by: ORTHOPAEDIC SURGERY

## 2020-04-24 PROCEDURE — 2500000003 HC RX 250 WO HCPCS: Performed by: STUDENT IN AN ORGANIZED HEALTH CARE EDUCATION/TRAINING PROGRAM

## 2020-04-24 PROCEDURE — 72170 X-RAY EXAM OF PELVIS: CPT

## 2020-04-24 PROCEDURE — 1200000000 HC SEMI PRIVATE

## 2020-04-24 PROCEDURE — 97530 THERAPEUTIC ACTIVITIES: CPT

## 2020-04-24 PROCEDURE — 2580000003 HC RX 258

## 2020-04-24 PROCEDURE — 97165 OT EVAL LOW COMPLEX 30 MIN: CPT

## 2020-04-24 PROCEDURE — 2500000003 HC RX 250 WO HCPCS: Performed by: ORTHOPAEDIC SURGERY

## 2020-04-24 PROCEDURE — 85025 COMPLETE CBC W/AUTO DIFF WBC: CPT

## 2020-04-24 PROCEDURE — 3600000015 HC SURGERY LEVEL 5 ADDTL 15MIN: Performed by: ORTHOPAEDIC SURGERY

## 2020-04-24 PROCEDURE — 6370000000 HC RX 637 (ALT 250 FOR IP): Performed by: STUDENT IN AN ORGANIZED HEALTH CARE EDUCATION/TRAINING PROGRAM

## 2020-04-24 PROCEDURE — 2580000003 HC RX 258: Performed by: STUDENT IN AN ORGANIZED HEALTH CARE EDUCATION/TRAINING PROGRAM

## 2020-04-24 PROCEDURE — 97161 PT EVAL LOW COMPLEX 20 MIN: CPT

## 2020-04-24 PROCEDURE — 6360000002 HC RX W HCPCS

## 2020-04-24 PROCEDURE — 3600000005 HC SURGERY LEVEL 5 BASE: Performed by: ORTHOPAEDIC SURGERY

## 2020-04-24 PROCEDURE — 7100000000 HC PACU RECOVERY - FIRST 15 MIN: Performed by: ORTHOPAEDIC SURGERY

## 2020-04-24 PROCEDURE — 6360000002 HC RX W HCPCS: Performed by: ANESTHESIOLOGY

## 2020-04-24 PROCEDURE — 6370000000 HC RX 637 (ALT 250 FOR IP): Performed by: ORTHOPAEDIC SURGERY

## 2020-04-24 PROCEDURE — 80048 BASIC METABOLIC PNL TOTAL CA: CPT

## 2020-04-24 PROCEDURE — C1776 JOINT DEVICE (IMPLANTABLE): HCPCS | Performed by: ORTHOPAEDIC SURGERY

## 2020-04-24 PROCEDURE — 2580000003 HC RX 258: Performed by: ORTHOPAEDIC SURGERY

## 2020-04-24 PROCEDURE — 3700000001 HC ADD 15 MINUTES (ANESTHESIA): Performed by: ORTHOPAEDIC SURGERY

## 2020-04-24 PROCEDURE — 0SRS0JA REPLACEMENT OF LEFT HIP JOINT, FEMORAL SURFACE WITH SYNTHETIC SUBSTITUTE, UNCEMENTED, OPEN APPROACH: ICD-10-PCS | Performed by: ORTHOPAEDIC SURGERY

## 2020-04-24 PROCEDURE — 27236 TREAT THIGH FRACTURE: CPT | Performed by: ORTHOPAEDIC SURGERY

## 2020-04-24 PROCEDURE — 2700000000 HC OXYGEN THERAPY PER DAY

## 2020-04-24 PROCEDURE — 7100000001 HC PACU RECOVERY - ADDTL 15 MIN: Performed by: ORTHOPAEDIC SURGERY

## 2020-04-24 PROCEDURE — 88311 DECALCIFY TISSUE: CPT

## 2020-04-24 PROCEDURE — 2709999900 HC NON-CHARGEABLE SUPPLY: Performed by: ORTHOPAEDIC SURGERY

## 2020-04-24 PROCEDURE — 2500000003 HC RX 250 WO HCPCS

## 2020-04-24 PROCEDURE — 36415 COLL VENOUS BLD VENIPUNCTURE: CPT

## 2020-04-24 PROCEDURE — 3700000000 HC ANESTHESIA ATTENDED CARE: Performed by: ORTHOPAEDIC SURGERY

## 2020-04-24 PROCEDURE — 88305 TISSUE EXAM BY PATHOLOGIST: CPT

## 2020-04-24 PROCEDURE — 6360000002 HC RX W HCPCS: Performed by: STUDENT IN AN ORGANIZED HEALTH CARE EDUCATION/TRAINING PROGRAM

## 2020-04-24 DEVICE — HEAD FEM DIA26MM +0MM OFFSET HIP CO CHROM V40 TAPR LO FRIC: Type: IMPLANTABLE DEVICE | Site: HIP | Status: FUNCTIONAL

## 2020-04-24 DEVICE — HEAD FEM OD53MM ID26MM HIP CO CHROM POLYETH BPLR CEMENTLESS: Type: IMPLANTABLE DEVICE | Site: HIP | Status: FUNCTIONAL

## 2020-04-24 RX ORDER — SODIUM CHLORIDE 9 MG/ML
INJECTION, SOLUTION INTRAVENOUS CONTINUOUS PRN
Status: DISCONTINUED | OUTPATIENT
Start: 2020-04-24 | End: 2020-04-24 | Stop reason: SDUPTHER

## 2020-04-24 RX ORDER — ACETAMINOPHEN 325 MG/1
650 TABLET ORAL EVERY 6 HOURS
Status: DISCONTINUED | OUTPATIENT
Start: 2020-04-24 | End: 2020-04-26 | Stop reason: HOSPADM

## 2020-04-24 RX ORDER — NEOSTIGMINE METHYLSULFATE 1 MG/ML
INJECTION, SOLUTION INTRAVENOUS PRN
Status: DISCONTINUED | OUTPATIENT
Start: 2020-04-24 | End: 2020-04-24 | Stop reason: SDUPTHER

## 2020-04-24 RX ORDER — CEFAZOLIN SODIUM 2 G/50ML
2 SOLUTION INTRAVENOUS
Status: DISCONTINUED | OUTPATIENT
Start: 2020-04-24 | End: 2020-04-24 | Stop reason: HOSPADM

## 2020-04-24 RX ORDER — OXYCODONE HYDROCHLORIDE 10 MG/1
10 TABLET ORAL EVERY 4 HOURS PRN
Status: DISCONTINUED | OUTPATIENT
Start: 2020-04-24 | End: 2020-04-26 | Stop reason: HOSPADM

## 2020-04-24 RX ORDER — ONDANSETRON 2 MG/ML
INJECTION INTRAMUSCULAR; INTRAVENOUS PRN
Status: DISCONTINUED | OUTPATIENT
Start: 2020-04-24 | End: 2020-04-24 | Stop reason: SDUPTHER

## 2020-04-24 RX ORDER — CEFAZOLIN SODIUM 1 G/3ML
INJECTION, POWDER, FOR SOLUTION INTRAMUSCULAR; INTRAVENOUS PRN
Status: DISCONTINUED | OUTPATIENT
Start: 2020-04-24 | End: 2020-04-24 | Stop reason: SDUPTHER

## 2020-04-24 RX ORDER — OXYCODONE HYDROCHLORIDE 5 MG/1
5 TABLET ORAL EVERY 4 HOURS PRN
Status: DISCONTINUED | OUTPATIENT
Start: 2020-04-24 | End: 2020-04-26 | Stop reason: HOSPADM

## 2020-04-24 RX ORDER — MEPERIDINE HYDROCHLORIDE 25 MG/ML
12.5 INJECTION INTRAMUSCULAR; INTRAVENOUS; SUBCUTANEOUS EVERY 5 MIN PRN
Status: DISCONTINUED | OUTPATIENT
Start: 2020-04-24 | End: 2020-04-24 | Stop reason: HOSPADM

## 2020-04-24 RX ORDER — CEFAZOLIN SODIUM 2 G/50ML
2 SOLUTION INTRAVENOUS EVERY 8 HOURS
Status: COMPLETED | OUTPATIENT
Start: 2020-04-24 | End: 2020-04-25

## 2020-04-24 RX ORDER — SODIUM CHLORIDE 0.9 % (FLUSH) 0.9 %
10 SYRINGE (ML) INJECTION EVERY 12 HOURS SCHEDULED
Status: DISCONTINUED | OUTPATIENT
Start: 2020-04-24 | End: 2020-04-26 | Stop reason: HOSPADM

## 2020-04-24 RX ORDER — SODIUM CHLORIDE 0.9 % (FLUSH) 0.9 %
10 SYRINGE (ML) INJECTION PRN
Status: DISCONTINUED | OUTPATIENT
Start: 2020-04-24 | End: 2020-04-26 | Stop reason: HOSPADM

## 2020-04-24 RX ORDER — PROPOFOL 10 MG/ML
INJECTION, EMULSION INTRAVENOUS PRN
Status: DISCONTINUED | OUTPATIENT
Start: 2020-04-24 | End: 2020-04-24 | Stop reason: SDUPTHER

## 2020-04-24 RX ORDER — PROMETHAZINE HYDROCHLORIDE 25 MG/ML
6.25 INJECTION, SOLUTION INTRAMUSCULAR; INTRAVENOUS
Status: DISCONTINUED | OUTPATIENT
Start: 2020-04-24 | End: 2020-04-24 | Stop reason: HOSPADM

## 2020-04-24 RX ORDER — VANCOMYCIN HYDROCHLORIDE 1 G/20ML
INJECTION, POWDER, LYOPHILIZED, FOR SOLUTION INTRAVENOUS PRN
Status: DISCONTINUED | OUTPATIENT
Start: 2020-04-24 | End: 2020-04-24 | Stop reason: ALTCHOICE

## 2020-04-24 RX ORDER — MIDAZOLAM HYDROCHLORIDE 1 MG/ML
INJECTION INTRAMUSCULAR; INTRAVENOUS PRN
Status: DISCONTINUED | OUTPATIENT
Start: 2020-04-24 | End: 2020-04-24 | Stop reason: SDUPTHER

## 2020-04-24 RX ORDER — LABETALOL HYDROCHLORIDE 5 MG/ML
5 INJECTION, SOLUTION INTRAVENOUS EVERY 10 MIN PRN
Status: DISCONTINUED | OUTPATIENT
Start: 2020-04-24 | End: 2020-04-24 | Stop reason: HOSPADM

## 2020-04-24 RX ORDER — GLYCOPYRROLATE 1 MG/5 ML
SYRINGE (ML) INTRAVENOUS PRN
Status: DISCONTINUED | OUTPATIENT
Start: 2020-04-24 | End: 2020-04-24 | Stop reason: SDUPTHER

## 2020-04-24 RX ORDER — HYDRALAZINE HYDROCHLORIDE 20 MG/ML
5 INJECTION INTRAMUSCULAR; INTRAVENOUS EVERY 10 MIN PRN
Status: DISCONTINUED | OUTPATIENT
Start: 2020-04-24 | End: 2020-04-24 | Stop reason: HOSPADM

## 2020-04-24 RX ORDER — ROCURONIUM BROMIDE 10 MG/ML
INJECTION, SOLUTION INTRAVENOUS PRN
Status: DISCONTINUED | OUTPATIENT
Start: 2020-04-24 | End: 2020-04-24 | Stop reason: SDUPTHER

## 2020-04-24 RX ORDER — SODIUM CHLORIDE 9 MG/ML
INJECTION, SOLUTION INTRAVENOUS CONTINUOUS
Status: ACTIVE | OUTPATIENT
Start: 2020-04-24 | End: 2020-04-25

## 2020-04-24 RX ORDER — LIDOCAINE HYDROCHLORIDE 20 MG/ML
INJECTION, SOLUTION INTRAVENOUS PRN
Status: DISCONTINUED | OUTPATIENT
Start: 2020-04-24 | End: 2020-04-24 | Stop reason: SDUPTHER

## 2020-04-24 RX ORDER — DEXAMETHASONE SODIUM PHOSPHATE 10 MG/ML
INJECTION INTRAMUSCULAR; INTRAVENOUS PRN
Status: DISCONTINUED | OUTPATIENT
Start: 2020-04-24 | End: 2020-04-24 | Stop reason: SDUPTHER

## 2020-04-24 RX ORDER — HYDROCODONE BITARTRATE AND ACETAMINOPHEN 5; 325 MG/1; MG/1
1 TABLET ORAL EVERY 6 HOURS PRN
Qty: 28 TABLET | Refills: 0 | Status: SHIPPED | OUTPATIENT
Start: 2020-04-24 | End: 2020-05-01

## 2020-04-24 RX ORDER — SENNA AND DOCUSATE SODIUM 50; 8.6 MG/1; MG/1
1 TABLET, FILM COATED ORAL 2 TIMES DAILY
Status: DISCONTINUED | OUTPATIENT
Start: 2020-04-24 | End: 2020-04-26 | Stop reason: HOSPADM

## 2020-04-24 RX ORDER — FENTANYL CITRATE 50 UG/ML
INJECTION, SOLUTION INTRAMUSCULAR; INTRAVENOUS PRN
Status: DISCONTINUED | OUTPATIENT
Start: 2020-04-24 | End: 2020-04-24 | Stop reason: SDUPTHER

## 2020-04-24 RX ADMIN — ONDANSETRON HYDROCHLORIDE 4 MG: 2 INJECTION, SOLUTION INTRAMUSCULAR; INTRAVENOUS at 08:42

## 2020-04-24 RX ADMIN — HYDROMORPHONE HYDROCHLORIDE 0.5 MG: 1 INJECTION, SOLUTION INTRAMUSCULAR; INTRAVENOUS; SUBCUTANEOUS at 09:43

## 2020-04-24 RX ADMIN — PROPOFOL 150 MG: 10 INJECTION, EMULSION INTRAVENOUS at 07:50

## 2020-04-24 RX ADMIN — CEFAZOLIN SODIUM 2 G: 2 SOLUTION INTRAVENOUS at 16:54

## 2020-04-24 RX ADMIN — LIDOCAINE HYDROCHLORIDE 100 MG: 20 INJECTION, SOLUTION INTRAVENOUS at 07:50

## 2020-04-24 RX ADMIN — SODIUM CHLORIDE: 9 INJECTION, SOLUTION INTRAVENOUS at 12:22

## 2020-04-24 RX ADMIN — TRANEXAMIC ACID 1000 MG: 1 INJECTION, SOLUTION INTRAVENOUS at 10:56

## 2020-04-24 RX ADMIN — SENNOSIDES AND DOCUSATE SODIUM 1 TABLET: 8.6; 5 TABLET ORAL at 20:47

## 2020-04-24 RX ADMIN — SENNOSIDES AND DOCUSATE SODIUM 1 TABLET: 8.6; 5 TABLET ORAL at 12:20

## 2020-04-24 RX ADMIN — FENTANYL CITRATE 50 MCG: 50 INJECTION, SOLUTION INTRAMUSCULAR; INTRAVENOUS at 08:29

## 2020-04-24 RX ADMIN — LISINOPRIL 20 MG: 20 TABLET ORAL at 12:20

## 2020-04-24 RX ADMIN — DEXAMETHASONE SODIUM PHOSPHATE 10 MG: 10 INJECTION INTRAMUSCULAR; INTRAVENOUS at 07:50

## 2020-04-24 RX ADMIN — FENTANYL CITRATE 50 MCG: 50 INJECTION, SOLUTION INTRAMUSCULAR; INTRAVENOUS at 08:24

## 2020-04-24 RX ADMIN — HYDROMORPHONE HYDROCHLORIDE 0.5 MG: 1 INJECTION, SOLUTION INTRAMUSCULAR; INTRAVENOUS; SUBCUTANEOUS at 10:10

## 2020-04-24 RX ADMIN — OXYCODONE HYDROCHLORIDE 10 MG: 10 TABLET ORAL at 17:58

## 2020-04-24 RX ADMIN — ACETAMINOPHEN 650 MG: 325 TABLET, FILM COATED ORAL at 12:20

## 2020-04-24 RX ADMIN — URSODIOL 300 MG: 300 CAPSULE ORAL at 20:47

## 2020-04-24 RX ADMIN — SODIUM CHLORIDE: 9 INJECTION, SOLUTION INTRAVENOUS at 07:44

## 2020-04-24 RX ADMIN — TRANEXAMIC ACID 1 G: 1 INJECTION, SOLUTION INTRAVENOUS at 08:01

## 2020-04-24 RX ADMIN — FENTANYL CITRATE 150 MCG: 50 INJECTION, SOLUTION INTRAMUSCULAR; INTRAVENOUS at 07:50

## 2020-04-24 RX ADMIN — CEFAZOLIN 2000 MG: 1 INJECTION, POWDER, FOR SOLUTION INTRAMUSCULAR; INTRAVENOUS at 08:01

## 2020-04-24 RX ADMIN — MIDAZOLAM 2 MG: 1 INJECTION INTRAMUSCULAR; INTRAVENOUS at 07:49

## 2020-04-24 RX ADMIN — HYDROMORPHONE HYDROCHLORIDE 0.5 MG: 1 INJECTION, SOLUTION INTRAMUSCULAR; INTRAVENOUS; SUBCUTANEOUS at 10:16

## 2020-04-24 RX ADMIN — ACETAMINOPHEN 650 MG: 325 TABLET, FILM COATED ORAL at 18:00

## 2020-04-24 RX ADMIN — ROCURONIUM BROMIDE 50 MG: 10 INJECTION, SOLUTION INTRAVENOUS at 07:50

## 2020-04-24 RX ADMIN — HYDROMORPHONE HYDROCHLORIDE 0.5 MG: 1 INJECTION, SOLUTION INTRAMUSCULAR; INTRAVENOUS; SUBCUTANEOUS at 09:48

## 2020-04-24 RX ADMIN — TRIMETHOBENZAMIDE HYDROCHLORIDE 200 MG: 100 INJECTION INTRAMUSCULAR at 14:31

## 2020-04-24 RX ADMIN — AMLODIPINE BESYLATE 10 MG: 10 TABLET ORAL at 12:20

## 2020-04-24 RX ADMIN — SODIUM CHLORIDE: 9 INJECTION, SOLUTION INTRAVENOUS at 23:50

## 2020-04-24 RX ADMIN — Medication 3 MG: at 08:50

## 2020-04-24 RX ADMIN — SOTALOL HYDROCHLORIDE 40 MG: 80 TABLET ORAL at 12:20

## 2020-04-24 RX ADMIN — SOTALOL HYDROCHLORIDE 40 MG: 80 TABLET ORAL at 20:47

## 2020-04-24 RX ADMIN — Medication 0.6 MG: at 08:50

## 2020-04-24 RX ADMIN — URSODIOL 300 MG: 300 CAPSULE ORAL at 12:20

## 2020-04-24 ASSESSMENT — PAIN SCALES - GENERAL
PAINLEVEL_OUTOF10: 9
PAINLEVEL_OUTOF10: 0
PAINLEVEL_OUTOF10: 2
PAINLEVEL_OUTOF10: 9
PAINLEVEL_OUTOF10: 4
PAINLEVEL_OUTOF10: 0
PAINLEVEL_OUTOF10: 0
PAINLEVEL_OUTOF10: 9
PAINLEVEL_OUTOF10: 9
PAINLEVEL_OUTOF10: 8

## 2020-04-24 ASSESSMENT — PULMONARY FUNCTION TESTS
PIF_VALUE: 22
PIF_VALUE: 26
PIF_VALUE: 0
PIF_VALUE: 22
PIF_VALUE: 19
PIF_VALUE: 14
PIF_VALUE: 1
PIF_VALUE: 20
PIF_VALUE: 19
PIF_VALUE: 23
PIF_VALUE: 23
PIF_VALUE: 1
PIF_VALUE: 15
PIF_VALUE: 20
PIF_VALUE: 16
PIF_VALUE: 23
PIF_VALUE: 20
PIF_VALUE: 21
PIF_VALUE: 19
PIF_VALUE: 19
PIF_VALUE: 22
PIF_VALUE: 22
PIF_VALUE: 17
PIF_VALUE: 2
PIF_VALUE: 22
PIF_VALUE: 22
PIF_VALUE: 21
PIF_VALUE: 22
PIF_VALUE: 1
PIF_VALUE: 22
PIF_VALUE: 22
PIF_VALUE: 23
PIF_VALUE: 22
PIF_VALUE: 26
PIF_VALUE: 22
PIF_VALUE: 20
PIF_VALUE: 20
PIF_VALUE: 22
PIF_VALUE: 22
PIF_VALUE: 20
PIF_VALUE: 21
PIF_VALUE: 23
PIF_VALUE: 23
PIF_VALUE: 22
PIF_VALUE: 22
PIF_VALUE: 23
PIF_VALUE: 22
PIF_VALUE: 1
PIF_VALUE: 22
PIF_VALUE: 22
PIF_VALUE: 0
PIF_VALUE: 22
PIF_VALUE: 1
PIF_VALUE: 15
PIF_VALUE: 22
PIF_VALUE: 23
PIF_VALUE: 22
PIF_VALUE: 0
PIF_VALUE: 23
PIF_VALUE: 1
PIF_VALUE: 22
PIF_VALUE: 22
PIF_VALUE: 1
PIF_VALUE: 22
PIF_VALUE: 19
PIF_VALUE: 22
PIF_VALUE: 22
PIF_VALUE: 23
PIF_VALUE: 22
PIF_VALUE: 22
PIF_VALUE: 24
PIF_VALUE: 19
PIF_VALUE: 22
PIF_VALUE: 18
PIF_VALUE: 22
PIF_VALUE: 22
PIF_VALUE: 21
PIF_VALUE: 2
PIF_VALUE: 22

## 2020-04-24 ASSESSMENT — PAIN DESCRIPTION - ORIENTATION
ORIENTATION: LEFT

## 2020-04-24 ASSESSMENT — PAIN DESCRIPTION - LOCATION
LOCATION: HIP

## 2020-04-24 ASSESSMENT — PAIN DESCRIPTION - DESCRIPTORS
DESCRIPTORS: ACHING;CONSTANT;BURNING
DESCRIPTORS: ACHING;BURNING;CONSTANT
DESCRIPTORS: ACHING;DISCOMFORT;SORE
DESCRIPTORS: ACHING;DISCOMFORT;CONSTANT
DESCRIPTORS: ACHING;DISCOMFORT;SORE
DESCRIPTORS: ACHING;BURNING;CONSTANT

## 2020-04-24 ASSESSMENT — PAIN DESCRIPTION - PAIN TYPE
TYPE: SURGICAL PAIN

## 2020-04-24 ASSESSMENT — PAIN DESCRIPTION - FREQUENCY
FREQUENCY: CONTINUOUS

## 2020-04-24 ASSESSMENT — PAIN DESCRIPTION - PROGRESSION
CLINICAL_PROGRESSION: GRADUALLY IMPROVING
CLINICAL_PROGRESSION: GRADUALLY WORSENING
CLINICAL_PROGRESSION: GRADUALLY IMPROVING

## 2020-04-24 ASSESSMENT — PAIN DESCRIPTION - ONSET
ONSET: ON-GOING

## 2020-04-24 NOTE — CONSULTS
Orthopaedic H&P Note     Leif Heller is a 78 y.o. male, his YOB: 1940 with the following history as recorded in [facility]:             Patient Active Problem List     Diagnosis Date Noted    Closed displaced fracture of left femoral neck ([facility] 04/23/2020    Persistent atrial fibrillation 02/27/2018    Essential hypertension 02/27/2018    Chronic diastolic heart failure ([facility] 02/27/2018    Mitral valve regurgitation 02/27/2018    Sinus node dysfunction (HCC) 02/27/2018      Current Facility-Administered Medications          Current Outpatient Medications   Medication Sig Dispense Refill    acetaminophen (TYLENOL) 500 MG tablet Take 1,000 mg by mouth every 6 hours as needed for Pain        ursodiol (ACTIGALL) 300 MG capsule Take 1 capsule by mouth 2 times daily 180 capsule 1    apixaban (ELIQUIS) 5 MG TABS tablet Take 1 tablet by mouth 2 times daily 60 tablet 5    amLODIPine-benazepril (LOTREL) 10-20 MG per capsule Take 1 capsule by mouth daily 90 capsule 1    sotalol (BETAPACE) 80 MG tablet Take 1 tablet by mouth 2 times daily (Patient taking differently: Take 40 mg by mouth 2 times daily ) 180 tablet 1    Multiple Vitamins-Minerals (THERAPEUTIC MULTIVITAMIN-MINERALS) tablet Take 1 tablet by mouth daily          No current facility-administered medications for this visit.          Allergies: Flecainide and Zoloft [sertraline]  Past Medical History        Past Medical History:   Diagnosis Date    Atrial fibrillation ([facility]      Gallstone pancreatitis      H/O esophagogastroduodenoscopy 02/2013     dr Sarah Ferraro no Hpylori, or Howell's     H/O ETOH abuse       sober for several years    Hypertension      Pacemaker           Past Surgical History         Past Surgical History:   Procedure Laterality Date    CATARACT REMOVAL Bilateral      CHOLECYSTECTOMY        COLONOSCOPY   06/11/2016     additional dates: 2003,02/28/2013    ERCP         and ston removal 03/2007    OTHER SURGICAL HISTORY         cardioversion    PACEMAKER PLACEMENT         LASER LEAD EXTRACTION  03/02/2018         Family History         Family History   Problem Relation Age of Onset    Hypertension Father      Heart Disease Father      Emphysema Father      Coronary Art Dis Mother      Osteoporosis Mother           Social History           Tobacco Use    Smoking status: Never Smoker    Smokeless tobacco: Never Used   Substance Use Topics    Alcohol use: No                                    Chief Complaint   Patient presents with    Hip Pain       left hip pain. fell 04/08/20; ambulating with crutches, unable to bear weight         SUBJECTIVE: Kostas Malone is here for initial evaluation for their hip pain. States that they had injured it after falling in his garage and was subsequently evaluated in the ED and was discharged. DOI: 4/8/2020. Was seen in ER and XRs revealed no acute fracture. They were told to ice and take Tylenol and was referred here. Denies any other injuries, and no issues with this hip prior to injury. Denies any numbness or tingling. Patient states his pain is moderate to severe and has throbbing pain even when sitting in wheelchair. Is unable to ambulate. Has not taken much medication except over-the-counter Tylenol which he states is not improving his pain. Wife is present for this visit. Denies calf or posterior thigh pain. Denies any other injuries from his fall.      Review of Systems   Constitutional: Negative for fever, chills, diaphoresis, appetite change and fatigue. HENT: Negative for dental issues, hearing loss and tinnitus. Negative for congestion, sinus pressure, sneezing, sore throat. Negative for headache. Eyes: Negative for visual disturbance, blurred and double vision. Negative for pain, discharge, redness and itching  Respiratory: Negative for cough, shortness of breath and wheezing. Cardiovascular: Negative for chest pain, palpitations and leg swelling.  No dyspnea

## 2020-04-24 NOTE — PROGRESS NOTES
OCCUPATIONAL THERAPY INITIAL EVALUATION      Date:2020  Patient Name: Mary Fish  MRN: [id number]  : 1940  Room: [address] [place] [address]    Referring Provider: Aleksandr Wall DO    Evaluating OT: Ramana Leonardo OTR/L    AM-PAC Daily Activity Raw Score:   Recommended Adaptive Equipment: ww, shower chair, LB AE, 3-in-1 commode     Diagnosis: L hip fx  Surgery:  L hip hemiarthroplasty anterolateral approach  Past Medical History:   Diagnosis Date    Arthritis     Knees    [facility]     Cancer ([facility]     skin cancer    CHF (congestive heart failure) ([facility]     Gallstone pancreatitis     H/O esophagogastroduodenoscopy 2013    dr Sherie Amaya no Hpylori, or Howell's     H/O ETOH abuse     sober for several years    Hypertension     MDRO (multiple drug resistant organisms) resistance     Pacemaker        Precautions:  Falls, anterolateral hip precautions (no hip adduction, no external rotation, no hyperextension), WBAT LLE, 3 L O2     Home Living: Pt lives with wife  in a 1 floor with 5 step(s) to enter and 1 rail(s); bed/bath on first  Bathroom setup: tub/shower with grab bars, lower commode  Equipment owned: crutches  Prior Level of Function: independent with ADLs independent with IADLs; using no device for ambulation.  Pt using crutches since the fall    Pain Level: L hip soreness  Cognition: A&O: /; Follows 1-2 step directions   Memory: good   Sequencing: good   Problem solving: good   Judgement/safety: good     Functional Assessment:   Initial Eval Status  Date: 20 Treatment Status  Date: Short Term Goals  Treatment frequency: 2-5 days/week   Feeding setup     independent   Grooming Setup/SBA    MOD I   UB Dressing Setup/SBA    MOD I   LB Dressing DEP with socks bed level     MIN A   Bathing MAX A    MIN A   Toileting MAX A    MIN A   Bed Mobility  Supine to sit: MIN A  Sit to supine: NT  Supine to sit: SBA/supervision  Sit to supine: SBA/supervision   Functional Transfers Sit to stand: MIN A, cues for hand placement and walker safety  Stand to sit: MIN A  SBA   Functional Mobility MIN A with ww side step to chair    SBA   Balance Sitting:     Static:  SBA, pt c/o nausea with sitting EOB    Dynamic:SBA  Standing: MIN A  Good   Endurance/Activity Tolerance fair  good   Visual/  Perceptual Glasses: reading             Hand dominance: R  UE ROM: RUE:   grossly WFL  LUE:   grossly WFL  Strength: RUE: 4+/5    LUE: 4+/5   Strength:  B WFL  Fine Motor Coordination:  WFL    Hearing: WFL  Sensation:   No c/o numbness or tingling  Tone: WFL  Edema: none                      Comments/treatment: Upon arrival, patient supine in bed and in agreement to OT evaluation with collaboration with PT. Pt performed functional tasks as stated above. Pt educated on precautions during ADL and functional tasks with fair follow through. Pt requires frequent cues for safety throughout. Pt c/o nausea and dizziness upon sitting. Skilled monitoring of pts vitals completed. SPO2 between 90-96% on 3 liters, /82. At end of session, patient left seated in bedside chair with call light and phone within reach, all lines and tubes intact. Pt demonstrating F+ understanding of education/techniques. Patient would benefit from continued skilled OT  to improve safety, functional independence and quality of life.     Assessment of current deficits:    Functional mobility [x]  ADLs [] Strength []  Cognition []  Functional transfers  [x] IADLs [] Safety Awareness [x]  Endurance [x]  Fine Motor Coordination [] Balance [x] Vision/perception [] Sensation []   Gross Motor Coordination [] ROM [] Delirium []                  Motor Control []    Plan of Care:    ADL retraining [x]   Equipment needs [x]   Neuromuscular re-education [] Energy Conservation Techniques [x]  Functional Transfer training [x] Patient and/or Family Education [x]  Functional Mobility training [x]  Environmental Modifications []  Cognitive re-training []

## 2020-04-24 NOTE — PLAN OF CARE
Problem: Falls - Risk of:  Goal: Will remain free from falls  Description: Will remain free from falls  Outcome: Met This Shift     Problem: Falls - Risk of:  Goal: Absence of physical injury  Description: Absence of physical injury  Outcome: Met This Shift     Problem: Physical Regulation:  Goal: Will remain free from infection  Description: Will remain free from infection  Outcome: Met This Shift     Problem: Pain:  Goal: Control of acute pain  Description: Control of acute pain  Outcome: Met This Shift

## 2020-04-24 NOTE — ANESTHESIA PRE PROCEDURE
Department of Anesthesiology  Preprocedure Note       Name:  Mary Fish   Age:  78 y.o.  :  1940                                          MRN:  [id number]         Date:  2020      Surgeon: Melodie Arellano):  Geovany Faria DO    Procedure: HIP HEMIARTHROPLASTY -- HILARY (Left )    Medications prior to admission:   Prior to Admission medications    Medication Sig Start Date End Date Taking?  Authorizing Provider   acetaminophen (TYLENOL) 500 MG tablet Take 1,000 mg by mouth every 6 hours as needed for Pain   Yes Historical Provider, MD   ursodiol (ACTIGALL) 300 MG capsule Take 1 capsule by mouth 2 times daily 3/19/20  Yes Bhanu Field MD   apixaban (ELIQUIS) 5 MG TABS tablet Take 1 tablet by mouth 2 times daily 3/19/20  Yes Bhanu Field MD   amLODIPine-benazepril (LOTREL) 10-20 MG per capsule Take 1 capsule by mouth daily 3/19/20  Yes Bhanu Field MD   sotalol (BETAPACE) 80 MG tablet Take 1 tablet by mouth 2 times daily  Patient taking differently: Take 40 mg by mouth 2 times daily  3/12/20  Yes Bhanu Field MD   Multiple Vitamins-Minerals (THERAPEUTIC MULTIVITAMIN-MINERALS) tablet Take 1 tablet by mouth daily   Yes Historical Provider, MD       Current medications:    Current Facility-Administered Medications   Medication Dose Route Frequency Provider Last Rate Last Dose    tranexamic acid (CYKLOKAPRON) 1,000 mg in dextrose 5 % 100 mL IVPB  1,000 mg Intravenous Once Geovany Faria DO        sotalol (BETAPACE) tablet 40 mg  40 mg Oral BID Delnorriss Shell, PA   40 mg at 20 221    ursodiol (ACTIGALL) capsule 300 mg  300 mg Oral BID Delnorriss Shell, PA   300 mg at 20 2212    sodium chloride flush 0.9 % injection 10 mL  10 mL Intravenous 2 times per day Viridiana Garvin, PA   10 mL at 20    sodium chloride flush 0.9 % injection 10 mL  10 mL Intravenous PRN Viridiana Garvin PA        potassium chloride (KLOR-CON M) extended release tablet 40 mEq  40 mEq Oral PRN 04/24/2020     04/24/2020    CO2 27 04/24/2020    BUN 12 04/24/2020    CREATININE 0.9 04/24/2020    GFRAA >60 04/24/2020    LABGLOM >60 04/24/2020    GLUCOSE 79 04/24/2020    PROT 7.9 04/23/2020    CALCIUM 9.2 04/24/2020    BILITOT 1.5 04/23/2020    ALKPHOS 376 04/23/2020    AST 32 04/23/2020    ALT 26 04/23/2020       POC Tests: No results for input(s): POCGLU, POCNA, POCK, POCCL, POCBUN, POCHEMO, POCHCT in the last 72 hours. Coags:   Lab Results   Component Value Date    PROTIME 15.0 04/23/2020    INR 1.3 04/23/2020    APTT 43.2 04/23/2020       HCG (If Applicable): No results found for: PREGTESTUR, PREGSERUM, HCG, HCGQUANT     ABGs: No results found for: PHART, PO2ART, VCT8GYD, XLV8YYW, BEART, Z3JAAHUR     Type & Screen (If Applicable):  No results found for: LABABO, [address]    Anesthesia Evaluation  Patient summary reviewed and Nursing notes reviewed no history of anesthetic complications:   Airway: Mallampati: III  TM distance: >3 FB   Neck ROM: full  Mouth opening: > = 3 FB Dental:          Pulmonary:Negative Pulmonary ROS                              Cardiovascular:    (+) hypertension:, valvular problems/murmurs: MR, pacemaker (pacemaker removed per patient ):, dysrhythmias: atrial fibrillation, CHF:, hyperlipidemia      ECG reviewed      Echocardiogram reviewed                  Neuro/Psych:   Negative Neuro/Psych ROS              GI/Hepatic/Renal:   (+) GERD: well controlled,           Endo/Other:    (+) blood dyscrasia: anticoagulation therapy, arthritis:., .                  ROS comment: S/p fall 2 weeks ago  Abdominal:           Vascular:                                        Anesthesia Plan      general     ASA 3       Induction: intravenous. Anesthetic plan and risks discussed with patient. Use of blood products discussed with patient whom. Plan discussed with attending.                   JOE Hinton - ELNI   4/24/2020        DOS STAFF ADDENDUM:    Patient seen and chart reviewed. Physical exam and history updated as indicated. NPO status confirmed. Anesthesia options and plan discussed including risks benefits with patient/legal guardian and family as available. Concerns and questions addressed. Consent verbalized to proceed. Anesthesia plan, options and intraoperative/postoperative concerns discussed with care team.    Pacemaker taken out due to infection. Not replaced.     Milagros Cordero MD  4/24/2020  7:37 AM

## 2020-04-24 NOTE — H&P
(ACTIGALL) 300 MG capsule, Take 1 capsule by mouth 2 times daily  apixaban (ELIQUIS) 5 MG TABS tablet, Take 1 tablet by mouth 2 times daily  amLODIPine-benazepril (LOTREL) 10-20 MG per capsule, Take 1 capsule by mouth daily  sotalol (BETAPACE) 80 MG tablet, Take 1 tablet by mouth 2 times daily (Patient taking differently: Take 40 mg by mouth 2 times daily )  Multiple Vitamins-Minerals (THERAPEUTIC MULTIVITAMIN-MINERALS) tablet, Take 1 tablet by mouth daily    Allergies:    Flecainide and Zoloft [sertraline]    Social History:    reports that he has never smoked. He has never used smokeless tobacco. He reports that he does not drink alcohol or use drugs. Family History:   family history includes Coronary Art Dis in his mother; Emphysema in his father; Heart Disease in his father; Hypertension in his father; Osteoporosis in his mother. REVIEW OF SYSTEMS:  As above in the HPI, otherwise negative    PHYSICAL EXAM:    Vitals:  BP (!) 185/97   Pulse 72   Temp 97.4 °F (36.3 °C) (Temporal)   Resp 17   Ht 6' 1\" (1.854 m)   Wt 173 lb 8 oz (78.7 kg)   SpO2 93%   BMI 22.89 kg/m²     General:  Awake, alert, oriented X 3. Well developed, well nourished, well groomed. No apparent distress. HEENT:  Normocephalic, atraumatic. Pupils equal, round, reactive to light. No scleral icterus. No conjunctival injection. Normal lips, teeth, and gums. No nasal discharge. Neck:  Supple  Heart:  RRR, no murmurs, gallops, rubs  Lungs:  CTA bilaterally, bilat symmetrical expansion, no wheeze, rales, or rhonchi  Abdomen:   Bowel sounds present, soft, nontender, no masses, no organomegaly, no peritoneal signs  Extremities:  No clubbing, cyanosis, or edema  Skin:  Warm and dry, no open lesions or rash  Neuro:  Cranial nerves 2-12 intact, no focal deficits  Breast: deferred  Rectal: deferred  Genitalia:  deferred    LABS:    CBC with Differential:    Lab Results   Component Value Date    WBC 3.6 04/24/2020    RBC 4.21 04/24/2020

## 2020-04-24 NOTE — OP NOTE
Operative Note        Pre-operative Diagnosis:  Left femoral neck fx    Post-operative Diagnosis:  same    Procedure:  Left Hip Hemiarthroplasty    Surgeon:  Kenn Canela DO     Assistant(s):  Rip Cox DO, Auysh Fenton DO, Francine Pelletier DO    Anesthesia:  General endotrachial anesthesia    Estimated blood loss:  Less than 100 ml    Specimens:  Femoral head    Implants:  Hugo accolade  degree hip stem size 7, 53/26/0mm bipolar femoral head    Complications:  none    Condition:  Stable    Brief Hospital Course:  Scott Aguilar is a patient admitted through the ER c/o Left hip pain. Xrays revealed a Left femoral neck fx. After examination of the patient, review of the radiologic studies, and appropriate pre-operative risk assessment, Cass Mark MD recommended Left hip hemiarthroplasty, which the patient was agreeable towards. All questions were addressed to patients satisfaction and they did elect to proceed with the procedure as outlined. Operative Course: Outside the operating suite, the patient was seen and identified. The operative site was marked and identified as appropriate by the patient, staff, surgeon, and Anesthesia. The patient was taken into the operating room, placed on the operative table, and placed under the appropriate amount of sedation under the care of the anesthesia team. The patient was placed into a lateral decubitus position. All bony prominences and neurovascular structures were well padded and protected. The operative site was then prepped and draped in a standard sterile fashion. An incision was made over the lateral trochanteric region and carried down to the level of the fascia corina maintaining appropriate hemostasis with electrocautery. A small rent was placed in the fascia corina. Chandler scissors were used to extend the fascia corina incision in line with its fibers to the length of the skin incision.  A Charnley retractor was then placed in the anterior and posterior copious irrigation was performed of the joint. Pericapsular tissues were now injected with premixed analgesic concoction. Ethibond was used to reapproximate the gluteus medius tendon to its trochanteric insertion. Copious irrigation was performed once more. The tensor fascia corina was then closed with an #0 Vicryl. Copious irrigation was performed once more. #0 and 2-0 Vicryl were used to close the subcutaneous layer. Sutures were used for the skin. A sterile layered dressing was placed over the wound. The patient was awakened from anesthesia, transferred to a hospital bed, and taken to the PACU in stable condition. Disposition: The patient was taken to PACU in stable condition. Once stable, he will be transferred to the floor. Orders have been provided to begin physical therapy, weight bear as tolerated Left lower extremity. Patient received a dose of ancef preoperatively. We will continue this for 24 hours postoperatively for infection prophylaxis. The patient will also be started on DVT ppx postoperative day 1. We have consulted  and case management for discharge planning and consulted the PCP for medical management.       Electronically Signed By  Boone Marsh DO  4/24/2020

## 2020-04-24 NOTE — BRIEF OP NOTE
Brief Postoperative Note      Patient: Carlotta Perkins  YOB: 1940  MRN: [id number]    Date of Procedure: 4/24/2020    Pre-Op Diagnosis: L Femoral Neck    Post-Op Diagnosis: Same       Procedure(s):  HIP HEMIARTHROPLASTY -- HILARY    Surgeon(s):  Cain Felipe DO    Assistant:  Resident: Robinson Nelson DO; Nori Cornelius DO; Poornima Andrade DO    Anesthesia: General    Estimated Blood Loss (mL): Minimal    Complications: None    Specimens:   ID Type Source Tests Collected by Time Destination   A : LEFT FEMORAL HEAD Bone Hip SURGICAL [address] DO Alcides 4/24/2020 7133        Implants:  Implant Name Type Inv.  Item Serial No.  Lot No. LRB No. Used Action   IMPL HIP FEM HEAD TAPR VITALLIUM 26MM 0MM Hip IMPL HIP FEM HEAD TAPR VITALLIUM 26MM 0MM  HILARY: ORTH[employer] [id number] Left 1 Implanted   IMPL HIP FEM HEAD BIPLR UHR UNIV [id number] Hip IMPL HIP FEM HEAD BIPLR UHR UNIV [id number]  HILARY: ORTHOPA[employer] [id number] Left 1 Implanted         Drains:   Urethral Catheter 16 fr (Active)   Catheter Indications Perioperative use in selected surgeries including but not limited to urologic, pelvic or need for intraoperative monitoring of urinary output due to prolonged surgery, large volume infusion or need for diuretic therapy in surgery 4/24/2020 12:41 AM   Output (mL) 400 mL 4/24/2020  6:41 AM       Findings: See op note    Electronically signed by Poornima Andrade DO on 4/24/2020 at 9:01 AM

## 2020-04-24 NOTE — PROGRESS NOTES
Physical Therapy      Physical Therapy Initial Assessment     Name: Curtis Boyce  :   MRN: [id number]    Referring Provider:  Esther Leong DO    Date of Service: 2020    Evaluating PT:  Ramon Schulz PT, DPT PT [id number]    Room #:  5093/2917-E  Diagnosis:  Fall L Femoral Neck Fx  PMHx/PSHx:  HTN, Afib, Pacemaker, Skin Cancer, CHF  Procedure/Surgery:  L Hip Hemiarthroplasty 2020  Precautions:  Anterolateral Hip Precautions, Falls, O2, WBAT LLE  Equipment Needs:  [employer]    SUBJECTIVE:    Pt lives with spouse in a 1 story home with 5 stairs to enter and single rail. Bed is on first floor and bath is on first floor. Pt has laundry in basement, in which he states spouse can perform    Pt ambulated with no device independently PTA. Pt reports he had been using axillary crutches since initial fall  Equipment Owned: Axillary Crutches    OBJECTIVE:   Initial Evaluation  Date:  Treatment Short Term/ Long Term   Goals   AM-PAC 6 Clicks 50/02     Was pt agreeable to Eval/treatment? Yes     Does pt have pain? No c/o pain only nausea     Bed Mobility  Rolling: NT  Supine to sit: Jesse  Sit to supine: NT  Scooting: SBA  Rolling: Independent    Supine to sit:  Independent    Sit to supine: Independent    Scooting: Independent     Transfers Sit to stand: Jesse  Stand to sit: Jesse  Stand pivot: Jesse [employer]  Sit to stand: Modified Independent    Stand to sit: Modified Independent    Stand pivot: Modified Independent  [employer]   Ambulation    5 feet with [employer]- limited by nausea  >150 feet with Modified Independent  AAD   Stair negotiation: ascended and descended  NT  >4 steps with single rail Modified Independent     ROM BUE:  See OT Note  BLE:  WFL     Strength BUE:  See OT Note  LLE: 3+/5 grossly  RLE: 4/5 grossly  Improve Strength 1/3 MMT Grade   Balance Sitting EOB:  SBA  Dynamic Standing:  Jesse [employer]  Sitting EOB:  Independent    Dynamic Standing:  Modified Independent  AAD     Pt is A & O x 4  Sensation:  Decreased L Hip  Edema: WNL    Vitals:  HR 75  Spo2 93% 3 L O2  PRE  HR 75  Spo2 93% 3 L O2  POST      Patient education  Pt educated on role of PT, pursed lip breathing, hip precautions    Patient response to education:   Pt verbalized understanding Pt demonstrated skill Pt requires further education in this area   x x x     ASSESSMENT:    Comments:  Pt received in supine agreeable to PT evaluation. Pt educated on hip precautions. Pt activity limited by nausea, and dizziness (pressure stable listed above). Pt performing bed mobility with steadying assistance. Pt sitting statically with no assist. Pt requiring cues for safety with functional transfers. Pt ambulating with decreased speed, antalgic, step to pattern. Distance limited by nausea. Patient would benefit from continued skilled PT to maximize functional mobility independence. RN notified    Treatment:  Patient practiced and was instructed in the following treatment:     Bed mobility- verbal cues for positioning and sequencing   Functional transfers-Verbal cues for proper positioning and sequencing to perform transfers safely with maximum independence.  Gait training-Verbal cues for proper positioning and sequencing using assistive device to maximize functional mobility independence. Pt's/ family goals   1. Get better    Patient and or family understand(s) diagnosis, prognosis, and plan of care. yes    PLAN:    PT care will be provided in accordance with the objectives noted above. Exercises and functional mobility practice will be used as well as appropriate assistive devices or modalities to obtain goals. Patient and family education will also be administered as needed. Frequency of treatments: 5-7x/week x 1-2 weeks.     Time in  1340  Time out  1403    Total Treatment Time  15 minutes     Evaluation Time includes thorough review of current medical information, gathering information on past medical history/social history and prior level of function,

## 2020-04-25 LAB
ANION GAP SERPL CALCULATED.3IONS-SCNC: 11 MMOL/L (ref 7–16)
BUN BLDV-MCNC: 22 MG/DL (ref 8–23)
CALCIUM SERPL-MCNC: 8.6 MG/DL (ref 8.6–10.2)
CHLORIDE BLD-SCNC: 99 MMOL/L (ref 98–107)
CO2: 25 MMOL/L (ref 22–29)
CREAT SERPL-MCNC: 1 MG/DL (ref 0.7–1.2)
EKG ATRIAL RATE: 77 BPM
EKG P AXIS: 81 DEGREES
EKG P-R INTERVAL: 276 MS
EKG Q-T INTERVAL: 396 MS
EKG QRS DURATION: 98 MS
EKG QTC CALCULATION (BAZETT): 448 MS
EKG R AXIS: -59 DEGREES
EKG T AXIS: 57 DEGREES
EKG VENTRICULAR RATE: 77 BPM
GFR AFRICAN AMERICAN: >60
GFR NON-AFRICAN AMERICAN: >60 ML/MIN/1.73
GLUCOSE BLD-MCNC: 102 MG/DL (ref 74–99)
HCT VFR BLD CALC: 32.7 % (ref 37–54)
HEMOGLOBIN: 10.2 G/DL (ref 12.5–16.5)
MCH RBC QN AUTO: 28 PG (ref 26–35)
MCHC RBC AUTO-ENTMCNC: 31.2 % (ref 32–34.5)
MCV RBC AUTO: 89.8 FL (ref 80–99.9)
PDW BLD-RTO: 15.1 FL (ref 11.5–15)
PLATELET # BLD: 159 E9/L (ref 130–450)
PMV BLD AUTO: 9.6 FL (ref 7–12)
POTASSIUM REFLEX MAGNESIUM: 4.9 MMOL/L (ref 3.5–5)
RBC # BLD: 3.64 E12/L (ref 3.8–5.8)
SODIUM BLD-SCNC: 135 MMOL/L (ref 132–146)
WBC # BLD: 6.6 E9/L (ref 4.5–11.5)

## 2020-04-25 PROCEDURE — 6370000000 HC RX 637 (ALT 250 FOR IP): Performed by: STUDENT IN AN ORGANIZED HEALTH CARE EDUCATION/TRAINING PROGRAM

## 2020-04-25 PROCEDURE — 2580000003 HC RX 258: Performed by: STUDENT IN AN ORGANIZED HEALTH CARE EDUCATION/TRAINING PROGRAM

## 2020-04-25 PROCEDURE — 1200000000 HC SEMI PRIVATE

## 2020-04-25 PROCEDURE — 6360000002 HC RX W HCPCS: Performed by: STUDENT IN AN ORGANIZED HEALTH CARE EDUCATION/TRAINING PROGRAM

## 2020-04-25 PROCEDURE — 36415 COLL VENOUS BLD VENIPUNCTURE: CPT

## 2020-04-25 PROCEDURE — 2700000000 HC OXYGEN THERAPY PER DAY

## 2020-04-25 PROCEDURE — 80048 BASIC METABOLIC PNL TOTAL CA: CPT

## 2020-04-25 PROCEDURE — 97530 THERAPEUTIC ACTIVITIES: CPT

## 2020-04-25 PROCEDURE — 6370000000 HC RX 637 (ALT 250 FOR IP): Performed by: INTERNAL MEDICINE

## 2020-04-25 PROCEDURE — 85027 COMPLETE CBC AUTOMATED: CPT

## 2020-04-25 RX ADMIN — URSODIOL 300 MG: 300 CAPSULE ORAL at 21:57

## 2020-04-25 RX ADMIN — OXYCODONE 5 MG: 5 TABLET ORAL at 16:16

## 2020-04-25 RX ADMIN — SODIUM CHLORIDE, PRESERVATIVE FREE 10 ML: 5 INJECTION INTRAVENOUS at 09:54

## 2020-04-25 RX ADMIN — AMLODIPINE BESYLATE 10 MG: 10 TABLET ORAL at 09:53

## 2020-04-25 RX ADMIN — APIXABAN 5 MG: 5 TABLET, FILM COATED ORAL at 21:57

## 2020-04-25 RX ADMIN — ACETAMINOPHEN 650 MG: 325 TABLET, FILM COATED ORAL at 18:04

## 2020-04-25 RX ADMIN — SENNOSIDES AND DOCUSATE SODIUM 1 TABLET: 8.6; 5 TABLET ORAL at 09:53

## 2020-04-25 RX ADMIN — SODIUM CHLORIDE, PRESERVATIVE FREE 10 ML: 5 INJECTION INTRAVENOUS at 21:58

## 2020-04-25 RX ADMIN — OXYCODONE HYDROCHLORIDE 10 MG: 10 TABLET ORAL at 09:53

## 2020-04-25 RX ADMIN — ACETAMINOPHEN 650 MG: 325 TABLET, FILM COATED ORAL at 11:33

## 2020-04-25 RX ADMIN — SOTALOL HYDROCHLORIDE 40 MG: 80 TABLET ORAL at 21:56

## 2020-04-25 RX ADMIN — ACETAMINOPHEN 650 MG: 325 TABLET, FILM COATED ORAL at 01:52

## 2020-04-25 RX ADMIN — URSODIOL 300 MG: 300 CAPSULE ORAL at 09:53

## 2020-04-25 RX ADMIN — APIXABAN 5 MG: 5 TABLET, FILM COATED ORAL at 11:32

## 2020-04-25 RX ADMIN — CEFAZOLIN SODIUM 2 G: 2 SOLUTION INTRAVENOUS at 00:02

## 2020-04-25 RX ADMIN — SENNOSIDES AND DOCUSATE SODIUM 1 TABLET: 8.6; 5 TABLET ORAL at 21:57

## 2020-04-25 RX ADMIN — SOTALOL HYDROCHLORIDE 40 MG: 80 TABLET ORAL at 09:54

## 2020-04-25 RX ADMIN — OXYCODONE HYDROCHLORIDE 10 MG: 10 TABLET ORAL at 22:00

## 2020-04-25 RX ADMIN — ACETAMINOPHEN 650 MG: 325 TABLET, FILM COATED ORAL at 06:11

## 2020-04-25 ASSESSMENT — PAIN DESCRIPTION - FREQUENCY
FREQUENCY: INTERMITTENT
FREQUENCY: INTERMITTENT

## 2020-04-25 ASSESSMENT — PAIN SCALES - GENERAL
PAINLEVEL_OUTOF10: 5
PAINLEVEL_OUTOF10: 7
PAINLEVEL_OUTOF10: 4
PAINLEVEL_OUTOF10: 4
PAINLEVEL_OUTOF10: 6
PAINLEVEL_OUTOF10: 0
PAINLEVEL_OUTOF10: 6
PAINLEVEL_OUTOF10: 5

## 2020-04-25 ASSESSMENT — PAIN DESCRIPTION - ONSET
ONSET: AWAKENED FROM SLEEP
ONSET: AWAKENED FROM SLEEP

## 2020-04-25 ASSESSMENT — PAIN DESCRIPTION - PAIN TYPE
TYPE: ACUTE PAIN;SURGICAL PAIN

## 2020-04-25 ASSESSMENT — PAIN DESCRIPTION - ORIENTATION
ORIENTATION: LEFT
ORIENTATION: LEFT

## 2020-04-25 ASSESSMENT — PAIN DESCRIPTION - DESCRIPTORS
DESCRIPTORS: ACHING;DISCOMFORT;SHARP
DESCRIPTORS: ACHING;CONSTANT;DISCOMFORT

## 2020-04-25 ASSESSMENT — PAIN DESCRIPTION - LOCATION
LOCATION: HIP

## 2020-04-25 NOTE — PROGRESS NOTES
education will also be administered as needed. Frequency of treatments: 5-7x/week x 1-2 weeks. Time in  0900  Time out  0925     Total Treatment Time  25 minutes     Treatment Time includes thorough review of current medical information, gathering information on past medical history/social history and prior level of function, completion of standardized testing/informal observation of tasks, assessment of data and education on plan of care and goals.     CPT codes:  [] Low Complexity PT evaluation 79246  [] Moderate Complexity PT evaluation 57773  [] High Complexity PT evaluation 08834  [] PT Re-evaluation 94273  [] Gait training 18870 0 minutes  [] Manual therapy 78252 0 minutes  [x] Therapeutic activities 54556 25 minutes  [] Therapeutic exercises 87370 0 minutes  [] Neuromuscular reeducation 35851 0 minutes       Abhinav Aguilar DPT  [id number]

## 2020-04-26 VITALS
DIASTOLIC BLOOD PRESSURE: 65 MMHG | TEMPERATURE: 98.9 F | SYSTOLIC BLOOD PRESSURE: 139 MMHG | OXYGEN SATURATION: 93 % | HEART RATE: 63 BPM | WEIGHT: 173.5 LBS | RESPIRATION RATE: 16 BRPM | HEIGHT: 73 IN | BODY MASS INDEX: 22.99 KG/M2

## 2020-04-26 LAB
ANION GAP SERPL CALCULATED.3IONS-SCNC: 8 MMOL/L (ref 7–16)
BUN BLDV-MCNC: 20 MG/DL (ref 8–23)
CALCIUM SERPL-MCNC: 8.9 MG/DL (ref 8.6–10.2)
CHLORIDE BLD-SCNC: 100 MMOL/L (ref 98–107)
CO2: 26 MMOL/L (ref 22–29)
CREAT SERPL-MCNC: 1 MG/DL (ref 0.7–1.2)
GFR AFRICAN AMERICAN: >60
GFR NON-AFRICAN AMERICAN: >60 ML/MIN/1.73
GLUCOSE BLD-MCNC: 100 MG/DL (ref 74–99)
HCT VFR BLD CALC: 29 % (ref 37–54)
HEMOGLOBIN: 9.2 G/DL (ref 12.5–16.5)
MCH RBC QN AUTO: 28.3 PG (ref 26–35)
MCHC RBC AUTO-ENTMCNC: 31.7 % (ref 32–34.5)
MCV RBC AUTO: 89.2 FL (ref 80–99.9)
PDW BLD-RTO: 15.3 FL (ref 11.5–15)
PLATELET # BLD: 140 E9/L (ref 130–450)
PMV BLD AUTO: 9.4 FL (ref 7–12)
POTASSIUM REFLEX MAGNESIUM: 4.6 MMOL/L (ref 3.5–5)
RBC # BLD: 3.25 E12/L (ref 3.8–5.8)
SODIUM BLD-SCNC: 134 MMOL/L (ref 132–146)
WBC # BLD: 4.2 E9/L (ref 4.5–11.5)

## 2020-04-26 PROCEDURE — 6370000000 HC RX 637 (ALT 250 FOR IP): Performed by: STUDENT IN AN ORGANIZED HEALTH CARE EDUCATION/TRAINING PROGRAM

## 2020-04-26 PROCEDURE — 2580000003 HC RX 258: Performed by: STUDENT IN AN ORGANIZED HEALTH CARE EDUCATION/TRAINING PROGRAM

## 2020-04-26 PROCEDURE — 80048 BASIC METABOLIC PNL TOTAL CA: CPT

## 2020-04-26 PROCEDURE — 6370000000 HC RX 637 (ALT 250 FOR IP): Performed by: INTERNAL MEDICINE

## 2020-04-26 PROCEDURE — 36415 COLL VENOUS BLD VENIPUNCTURE: CPT

## 2020-04-26 PROCEDURE — 85027 COMPLETE CBC AUTOMATED: CPT

## 2020-04-26 RX ADMIN — ACETAMINOPHEN 650 MG: 325 TABLET, FILM COATED ORAL at 06:32

## 2020-04-26 RX ADMIN — SODIUM CHLORIDE, PRESERVATIVE FREE 10 ML: 5 INJECTION INTRAVENOUS at 09:02

## 2020-04-26 RX ADMIN — OXYCODONE HYDROCHLORIDE 10 MG: 10 TABLET ORAL at 06:32

## 2020-04-26 RX ADMIN — AMLODIPINE BESYLATE 10 MG: 10 TABLET ORAL at 09:01

## 2020-04-26 RX ADMIN — ACETAMINOPHEN 650 MG: 325 TABLET, FILM COATED ORAL at 12:47

## 2020-04-26 RX ADMIN — SOTALOL HYDROCHLORIDE 40 MG: 80 TABLET ORAL at 09:01

## 2020-04-26 RX ADMIN — SENNOSIDES AND DOCUSATE SODIUM 1 TABLET: 8.6; 5 TABLET ORAL at 09:01

## 2020-04-26 RX ADMIN — APIXABAN 5 MG: 5 TABLET, FILM COATED ORAL at 09:01

## 2020-04-26 RX ADMIN — ACETAMINOPHEN 650 MG: 325 TABLET, FILM COATED ORAL at 00:36

## 2020-04-26 RX ADMIN — URSODIOL 300 MG: 300 CAPSULE ORAL at 09:01

## 2020-04-26 ASSESSMENT — PAIN DESCRIPTION - PROGRESSION: CLINICAL_PROGRESSION: NOT CHANGED

## 2020-04-26 ASSESSMENT — PAIN SCALES - GENERAL
PAINLEVEL_OUTOF10: 0
PAINLEVEL_OUTOF10: 3
PAINLEVEL_OUTOF10: 6
PAINLEVEL_OUTOF10: 0
PAINLEVEL_OUTOF10: 3

## 2020-04-26 ASSESSMENT — PAIN DESCRIPTION - LOCATION: LOCATION: HIP

## 2020-04-26 ASSESSMENT — PAIN DESCRIPTION - DESCRIPTORS: DESCRIPTORS: ACHING;DISCOMFORT;SORE

## 2020-04-26 ASSESSMENT — PAIN DESCRIPTION - ORIENTATION: ORIENTATION: LEFT

## 2020-04-26 ASSESSMENT — PAIN DESCRIPTION - FREQUENCY: FREQUENCY: INTERMITTENT

## 2020-04-26 ASSESSMENT — PAIN DESCRIPTION - PAIN TYPE: TYPE: SURGICAL PAIN

## 2020-04-26 ASSESSMENT — PAIN DESCRIPTION - ONSET: ONSET: GRADUAL

## 2020-04-26 NOTE — PROGRESS NOTES
Discharge instruction given to patient, all questions answered. Patient will be transported via private care, states his ride will be here by 1630.

## 2020-04-26 NOTE — DISCHARGE INSTR - COC
Continuity of Care Form    Patient Name: Duy Proctor   :  [birth year]  MRN:  [id number]    [facility] date:  2020  Discharge date: 2020    Code Status Order: Full Code   Advance Directives:   Advance Care Flowsheet Documentation     Date/Time Healthcare Directive Type of Healthcare Directive Copy in [address] Agent's Name Healthcare Agent's Phone Number    20 1416  Yes, patient has an advance directive for healthcare treatment  Living will  No, copy requested from family  Spouse    [phone]          Admitting Physician:  Loc Woo MD  PCP: Frank Christie MD    Discharging Nurse: [facility] Unit/Room#: Claire Renner Lorrie Olivier Brown Principal [facility] Unit Phone Number: [phone]    Emergency Contact:   Extended Emergency Contact Information  Primary Emergency Contact: Noelle Orozco  Address: [address] [place] [address] Phone: [phone]  Mobile Phone: [phone]  Relation: Spouse    Past Surgical History:  Past Surgical History:   Procedure Laterality Date    ABDOMEN SURGERY      CATARACT REMOVAL Bilateral     CHOLECYSTECTOMY      COLONOSCOPY  2016    additional dates: ,2013    ENDOSCOPY, COLON, DIAGNOSTIC      ERCP      and ston removal 2007    EYE SURGERY Bilateral     cataracts    HIP SURGERY Left 2020    HIP HEMIARTHROPLASTY -- HILARY performed by Carol Kaur DO at [address] [facility]      cardioversion    PACEMAKER PLACEMENT      LASER LEAD EXTRACTION  2018    SKIN BIOPSY      TONSILLECTOMY         Immunization History:   Immunization History   Administered Date(s) Administered    Influenza Virus Vaccine 2006, 2008, 2010, 2010, 2011, 2012, 2013, 10/03/2014, 2017, 10/02/2017    Influenza, High Dose (Fluzone 65 yrs and older) 2018    Pneumococcal Conjugate 13-valent (Evelyn Mutters) 10/02/2017    Pneumococcal Polysaccharide ([id number]) 04/12/2005, 10/02/2015, 08/26/2016       Active Problems:  Patient Active Problem List   Diagnosis Code    Atrial fibrillation ([facility]) I48.91    Paroxysmal atrial fibrillation ([facility]) I48.0    Essential hypertension I10    Chronic diastolic heart failure ([facility]) I50.32    Mitral valve regurgitation I34.0    Sinus node dysfunction ([facility]) I49.5    Closed displaced fracture of left femoral neck ([facility]) S72.002A    Closed left hip fracture, initial encounter ([facility]) S72.002A    Chronic anticoagulation-Eliquis Z79.01       Isolation/Infection:   Isolation          No Isolation        Patient Infection Status     None to display          Nurse Assessment:  Last Vital Signs: /65   Pulse 63   Temp 98.9 °F (37.2 °C) (Temporal)   Resp 16   Ht 6' 1\" (1.854 m)   Wt 173 lb 8 oz (78.7 kg)   SpO2 93%   BMI 22.89 kg/m²     Last documented pain score (0-10 scale): Pain Level: 3  Last Weight:   Wt Readings from Last 1 Encounters:   04/24/20 173 lb 8 oz (78.7 kg)     Mental Status:  oriented and alert    IV Access:  - None    Nursing Mobility/ADLs:  Walking   Assisted  Transfer  Independent  Bathing  Independent  Dressing  Independent  Toileting  Independent  Feeding  Independent  Med Admin  Independent  Med Delivery   whole    Wound Care Documentation and Therapy:  Incision 03/02/18 Chest Left;Upper (Active)   Number of days: 786        Elimination:  Continence:   · Bowel: Yes  · Bladder: Yes  Urinary Catheter: None   Colostomy/Ileostomy/Ileal Conduit: No       Date of Last BM: ***    Intake/Output Summary (Last 24 hours) at 4/26/2020 1453  Last data filed at 4/26/2020 1434  Gross per 24 hour   Intake 610 ml   Output 1350 ml   Net -740 ml     I/O last 3 completed shifts: In: 200 [P.O.:420;  I.V.:10]  Out: 1175 [Urine:1175]    Safety Concerns:     History of Falls (last 30 days) and At Risk for Falls    Impairments/Disabilities:      [address] Impairments/Disabilities:[id number]}    Nutrition Therapy:  Current Nutrition Therapy:   - Oral Diet:  General    Routes of Feeding: Oral  Liquids: Thin Liquids  Daily Fluid Restriction: no  Last Modified Barium Swallow with Video (Video Swallowing Test): not done    Treatments at the Time of Hospital Discharge:   Respiratory Treatments: none  Oxygen Therapy:  is not on home oxygen therapy. Ventilator:    - No ventilator support    Rehab Therapies: Physical Therapy  Weight Bearing Status/Restrictions: Weight bearing as tolerated to left lower extremity  Other Medical Equipment (for information only, NOT a DME order):  wheelchair and walker  Other Treatments: Keep PIEDAD dressing in place until postoperative day #7. Can just peel off. If wound is clean & dry, may leave open to air.  If not, keep covered with dry dressing until wound is dry      Patient's personal belongings (please select all that are sent with patient):  None    RN SIGNATURE:  Electronically signed by Mary Turcios RN on 4/26/20 at 2:59 PM EDT    CASE MANAGEMENT/SOCIAL WORK SECTION    Inpatient Status Date: ***    Readmission Risk Assessment Score:  Readmission Risk              Risk of Unplanned Readmission:        11           Discharging to Facility/ Agency   · Name:   · Address:  · Phone:  · Fax:    Dialysis Facility (if applicable)   · Name:  · Address:  · Dialysis Schedule:  · Phone:  · Fax:    / signature: {Esignature:[id number]}    PHYSICIAN SECTION    Prognosis: {Prognosis:2746405850}    Condition at Discharge: 50Catrina Zapata Patient Condition:284940107}    Rehab Potential (if transferring to Rehab): {Prognosis:3973945920}    Recommended Labs or Other Treatments After Discharge: ***    Physician Certification: I certify the above information and transfer of Magui Zepeda  is necessary for the continuing treatment of the diagnosis listed and that he requires {Admit to Appropriate Level of Care:07077} for {GREATER/LESS:020725423} 30 days.      Update Admission H&P: {CHP DME Changes in UCDYL:777168789}    PHYSICIAN SIGNATURE:  {Esignature:[id number]}

## 2020-04-26 NOTE — CARE COORDINATION
Social work updated by RN for pt plan to d/c home today. SW notified [facility] [address] liaison Traci.     Electronically signed by MITCHEL Reyes on 4/26/2020 at 3:01 PM

## 2020-04-26 NOTE — DISCHARGE SUMMARY
orthopedics  DVT prophylaxis-continue Eliquis as before  Patient chooses to do physical therapy at home and does not wish to go to rehab, he has scored 17-18 and is independent  Resume home medications including oral anticoagulation with Eliquis       Recent Labs     20  0507 20  0458 20  0506   WBC 3.6* 6.6 4.2*   HGB 11.6* 10.2* 9.2*   HCT 37.5 32.7* 29.0*    159 140       Recent Labs     20  0507 20  0458 20  0506    135 134   K 4.1 4.9 4.6    99 100   CO2 27 25 26   BUN 12 22 20   CREATININE 0.9 1.0 1.0   CALCIUM 9.2 8.6 8.9       Xr Pelvis (1-2 Views)    Result Date: 2020  Patient MRN:  [id number] : 1940 Age: 78 years Gender: Male Order Date:  2020 9:15 AM EXAM: XR PELVIS (1-2 VIEWS), XR HIP LEFT (2-3 VIEWS) NUMBER OF IMAGES:  3 INDICATION:  Left hip arthroplasty immediate assessment Post op COMPARISON: 2020 TECHNIQUE: 2 view left hip and single view pelvis. FINDINGS: Anatomically aligned left hip arthroplasty. No other active process related to the pelvis. The bones are somewhat demineralized. Anatomically aligned left hip arthroplasty. Bony demineralization. No unexpected findings. Xr Hip Left (2-3 Views)    Result Date: 2020  Patient MRN:  [id number] : 1940 Age: 78 years Gender: Male Order Date:  2020 9:15 AM EXAM: XR PELVIS (1-2 VIEWS), XR HIP LEFT (2-3 VIEWS) NUMBER OF IMAGES:  3 INDICATION:  Left hip arthroplasty immediate assessment Post op COMPARISON: 2020 TECHNIQUE: 2 view left hip and single view pelvis. FINDINGS: Anatomically aligned left hip arthroplasty. No other active process related to the pelvis. The bones are somewhat demineralized. Anatomically aligned left hip arthroplasty. Bony demineralization. No unexpected findings.     Xr Knee Left (3 Views)    Result Date: 2020  Patient MRN:  [id number] : 1940 Age: 78 years Gender: Male Order Date:  2020 4:00 PM EXAM: XR therapeutic multivitamin-minerals tablet     ursodiol 300 MG capsule  Commonly known as:  ACTIGALL  Take 1 capsule by mouth 2 times daily           Where to Get Your Medications      You can get these medications from any pharmacy    Bring a paper prescription for each of these medications  · HYDROcodone-acetaminophen 5-325 MG per tablet       Activity: activity as tolerated-continue PT OT at home  Diet: Cardiac diet  Pt has been advised to: Follow-up with Chris Hardy MD in 1 week.   Follow-up with consultants as recommended by [id number] with orthopedics as recommended    Note that over 30 minutes was spent in preparing discharge papers, discussing discharge with patient, medication review, etc.    Signed:  Finn Garay MD  4/26/2020  11:57 AM

## 2020-04-27 ENCOUNTER — CARE COORDINATION (OUTPATIENT)
Dept: CASE MANAGEMENT | Age: 80
End: 2020-04-27

## 2020-04-27 ENCOUNTER — TELEPHONE (OUTPATIENT)
Dept: ADMINISTRATIVE | Age: 80
End: 2020-04-27

## 2020-04-27 ENCOUNTER — TELEPHONE (OUTPATIENT)
Dept: ORTHOPEDIC SURGERY | Age: 80
End: 2020-04-27

## 2020-04-27 NOTE — TELEPHONE ENCOUNTER
pts wife called asking about scheduleng a 2 week post op. Please advise when to schedule pt.  Thanks

## 2020-04-27 NOTE — CARE COORDINATION
Patient contacted regarding recent discharge and COVID-19 risk   Care Transition Nurse/ Ambulatory Care Manager contacted the family by telephone to perform post discharge assessment. Verified name and  with family as identifiers. Patient has following risk factors of: heart failure. CTN/ACM reviewed discharge instructions, medical action plan and red flags related to discharge diagnosis. Reviewed and educated them on any new and changed medications related to discharge diagnosis. Advised obtaining a 90-day supply of all daily and as-needed medications. Education provided regarding infection prevention, and signs and symptoms of COVID-19 and when to seek medical attention with family who verbalized understanding. Discussed exposure protocols and quarantine from [address] you at higher risk for severe illness  and given an opportunity for questions and concerns. The family agrees to contact the COVID-19 hotline [phone] or PCP office for questions related to their healthcare. CTN/ACM provided contact information for future reference. From CDC: Are you at higher risk for severe illness?  Wash your hands often.  Avoid close contact (6 feet, which is about two arm lengths) with people who are sick.  Put distance between yourself and other people if COVID-19 is spreading in your community.  Clean and disinfect frequently touched surfaces.  Avoid all cruise travel and non-essential air travel.  Call your healthcare professional if you have concerns about COVID-19 and your underlying condition or if you are sick.     For more information on steps you can take to protect yourself, see CDC's How to [address] spoke with patient's wife (on communication release of information form) as patient was currently resting.  -Kristal [facility] visited today and her  had his first PT session.  - is awaiting a call back from PCP regarding need for

## 2020-04-27 NOTE — TELEPHONE ENCOUNTER
Wife called patient had partial hip replacement surgery last week. He was have some issues with heart rhyme Dr. Cosme Re changed his meds to 1/2 pill am and pm.   Wife had to call numbers into his office last week. Dr. Gabriel Laundry the  numbers and is going to keep him on this dosage. They want PCP to be made aware if any questions please call wife.  Thanks

## 2020-05-04 ENCOUNTER — CARE COORDINATION (OUTPATIENT)
Dept: CASE MANAGEMENT | Age: 80
End: 2020-05-04

## 2020-05-08 ENCOUNTER — OFFICE VISIT (OUTPATIENT)
Dept: ORTHOPEDIC SURGERY | Age: 80
End: 2020-05-08
Payer: MEDICARE

## 2020-05-08 ENCOUNTER — HOSPITAL ENCOUNTER (OUTPATIENT)
Dept: GENERAL RADIOLOGY | Age: 80
Discharge: HOME | End: 2020-05-10
Payer: MEDICARE

## 2020-05-08 VITALS
WEIGHT: 180 LBS | HEIGHT: 73 IN | HEART RATE: 58 BPM | DIASTOLIC BLOOD PRESSURE: 74 MMHG | TEMPERATURE: 97.1 F | SYSTOLIC BLOOD PRESSURE: 155 MMHG | BODY MASS INDEX: 23.86 KG/M2

## 2020-05-08 PROCEDURE — 99212 OFFICE O/P EST SF 10 MIN: CPT | Performed by: ORTHOPAEDIC SURGERY

## 2020-05-08 PROCEDURE — 99024 POSTOP FOLLOW-UP VISIT: CPT | Performed by: ORTHOPAEDIC SURGERY

## 2020-05-08 PROCEDURE — 73502 X-RAY EXAM HIP UNI 2-3 VIEWS: CPT

## 2020-05-08 NOTE — PROGRESS NOTES
Results   Component Value Date    WBC 4.2 04/26/2020    RBC 3.25 04/26/2020    HGB 9.2 04/26/2020    HCT 29.0 04/26/2020    MCV 89.2 04/26/2020    MCH 28.3 04/26/2020    MCHC 31.7 04/26/2020    RDW 15.3 04/26/2020     04/26/2020    MPV 9.4 04/26/2020     Radiology Review:   XR hip left:  Demonstrates previous bipolar hemiarthroplasty. Components in stable position without evidence of subsidence, loosening, or failure. The hip is well located    IMPRESSION:  Status post left hip hemiarthroplasty, 2 weeks out    PLAN:  1. Continue weightbearing as tolerated  2. Continue over-the-counter Tylenol for pain  3.  Reassured that pain should continue to decrease  4. Sutures removed in office, Steri-Strips applied  5. Dry dressing as needed  6. Ice as needed  7. Return for follow-up in 4 weeks, with new x-rays    Orthopaedic Attending    I have seen and evaluated the patient with the resident and agree with the above assessments on today's visit. I have performed the key components of the history and physical examination and concur completely with the findings and plans as documented above.     Electronically signed by   Chepe Gooden DO

## 2020-05-08 NOTE — PATIENT INSTRUCTIONS
Removed sutures today.   Steri strips should fall off in the shower at some point, if they do not fall off in 10 days, remove them  Dressing: Can remove dressing in 1-2 days then open to air  Can shower in a couple days, NO Soaking or submerging incision in water until fully healed & all scabs are gone    WB:  Weight bearing as tolerated on left lower extremity    Therapy: Continue home health physical therapy    Pain control : continue tylenol over the counter    Continue with ice to the injured extremity 2-3 times per day for swelling  If able continue with elevation and compression    Follow up in 4 weeks with xray of left hip  Call with questions or concerns

## 2020-06-08 ENCOUNTER — HOSPITAL ENCOUNTER (OUTPATIENT)
Dept: GENERAL RADIOLOGY | Age: 80
Discharge: HOME | End: 2020-06-10
Payer: MEDICARE

## 2020-06-08 ENCOUNTER — OFFICE VISIT (OUTPATIENT)
Dept: ORTHOPEDIC SURGERY | Age: 80
End: 2020-06-08
Payer: MEDICARE

## 2020-06-08 VITALS
SYSTOLIC BLOOD PRESSURE: 145 MMHG | TEMPERATURE: 98 F | WEIGHT: 180 LBS | HEIGHT: 73 IN | HEART RATE: 56 BPM | DIASTOLIC BLOOD PRESSURE: 83 MMHG | BODY MASS INDEX: 23.86 KG/M2

## 2020-06-08 PROBLEM — Z96.649 STATUS POST HIP HEMIARTHROPLASTY: Status: ACTIVE | Noted: 2020-06-08

## 2020-06-08 PROCEDURE — 99212 OFFICE O/P EST SF 10 MIN: CPT | Performed by: PHYSICIAN ASSISTANT

## 2020-06-08 PROCEDURE — 99024 POSTOP FOLLOW-UP VISIT: CPT | Performed by: PHYSICIAN ASSISTANT

## 2020-06-08 PROCEDURE — 73502 X-RAY EXAM HIP UNI 2-3 VIEWS: CPT

## 2020-06-11 ENCOUNTER — TELEPHONE (OUTPATIENT)
Dept: ORTHOPEDIC SURGERY | Age: 80
End: 2020-06-11

## 2020-08-04 ENCOUNTER — OFFICE VISIT (OUTPATIENT)
Dept: ORTHOPEDIC SURGERY | Age: 80
End: 2020-08-04
Payer: MEDICARE

## 2020-08-04 VITALS
SYSTOLIC BLOOD PRESSURE: 152 MMHG | RESPIRATION RATE: 16 BRPM | WEIGHT: 189 LBS | BODY MASS INDEX: 25.05 KG/M2 | DIASTOLIC BLOOD PRESSURE: 80 MMHG | HEART RATE: 50 BPM | HEIGHT: 73 IN

## 2020-08-04 PROCEDURE — 99213 OFFICE O/P EST LOW 20 MIN: CPT | Performed by: ORTHOPAEDIC SURGERY

## 2020-08-04 RX ORDER — LEUPROLIDE ACETATE 45 MG
45 KIT SUBCUTANEOUS ONCE
COMMUNITY

## 2020-08-04 NOTE — PROGRESS NOTES
Department of Orthopedic Trauma Surgery  Office Note      CHIEF COMPLAINT:   Chief Complaint   Patient presents with    Post-Op Check     Left Hip Hemiarthroplasty on 4/24/20 (3.5 months post op)        HISTORY OF PRESENT ILLNESS:                The patient is a [de-identified] y.o. male who presents for 3+month follow-up status post left hip hemiarthroplasty. Patient is accompanied by his daughter. Still has some discomfort to the hip which is vague. Main concern is he still continues to be deconditioned. He does ambulate but uses a cane. Was recently diagnosed with prostate cancer which per daughter appears to be a lower grade. He started treatment for this and feels that this has been giving him some side effects making him feel tired all the time. He did finish his initial physical therapy. Denies any new injury.        Past Medical History:        Diagnosis Date    Arthritis     Knees    Atrial fibrillation ([address]     Cancer (HCC)     skin cancer; Prostate cancer     CHF (congestive heart failure) ([address]     Gallstone pancreatitis     H/O esophagogastroduodenoscopy 02/2013    dr Nisha Johns no Hpylori, or Howell's     H/O ETOH abuse     sober for several years    Hypertension     MDRO (multiple drug resistant organisms) resistance     Pacemaker     REMOVED 2018 due to infection     Past Surgical History:        Procedure Laterality Date    ABDOMEN SURGERY      CATARACT REMOVAL Bilateral     CHOLECYSTECTOMY      COLONOSCOPY  06/11/2016    additional dates: 2003,02/28/2013    ENDOSCOPY, COLON, DIAGNOSTIC      ERCP      and ston removal 03/2007    EYE SURGERY Bilateral     cataracts    HIP SURGERY Left 4/24/2020    HIP HEMIARTHROPLASTY -- HILARY performed by Noa Camara DO at [facility]      cardioversion    PACEMAKER PLACEMENT      LASER LEAD EXTRACTION  03/02/2018    SKIN BIOPSY      TONSILLECTOMY       Current Medications:   No current facility-administered medications for this visit. Allergies:  Flecainide and Zoloft [sertraline]    Social History:   TOBACCO:   reports that he has never smoked. He has never used smokeless tobacco.  ETOH:   reports no history of alcohol use. DRUGS:   reports no history of drug use. ACTIVITIES OF DAILY LIVING:    OCCUPATION:    Family History:       Problem Relation Age of Onset    Hypertension Father     Heart Disease Father     Emphysema Father     Coronary Art Dis Mother     Osteoporosis Mother        REVIEW OF SYSTEMS:   Skin: no abnormal pigmentation, rash  Eyes: no blurring or eye pain   Ears/Nose/Throat: no hearing loss, tinnitus  Respiratory: No increased work of breathing, no coughing  Cardiovascular: Brisk capillary refill bilaterally, well perfused extremities  Gastrointestinal: no nausea, vomiting  Neurologic: no paralysis, or seizures  Musculoskeletal: Left hip pain      PHYSICAL EXAM:    VITALS:  BP (!) 152/80 (Site: Left Upper Arm, Position: Sitting)   Pulse 50   Resp 16   Ht 6' 1\" (1.854 m)   Wt 189 lb (85.7 kg)   BMI 24.94 kg/m²   Constitutional: Oriented to person, place, and time; Answer questions appropriately  HENT: Head: Normocephalic and atraumatic. Eyes: EOMI, MARILYN  Neck: Supple, trachea midline  Cardiovascular: Brisk capillary refill to all extremities, extremities well perfused  Pulmonary/Chest: No increased work of breathing, no cough  Abdominal: Non-tender, non-distended  Neurologic:  Awake, alert and oriented in three planes.  No gross deficits   Musculoskeletal:  Incision well-healed about the left hip, no signs of infection, no swelling  Able to passively logroll the hip without discomfort  Patient able to stand from a seated position actively  Compartment soft compressible  Demonstrates ability plantarflex/dorsiflex/extend great toe  Sensation intact light touch DP/SP/T/S/S nerve distributions  Foot warm and perfused  Calves supple/NT    DATA:    CBC:   Lab Results   Component Value Date WBC 4.2 04/26/2020    RBC 3.25 04/26/2020    HGB 9.2 04/26/2020    HCT 29.0 04/26/2020    MCV 89.2 04/26/2020    MCH 28.3 04/26/2020    MCHC 31.7 04/26/2020    RDW 15.3 04/26/2020     04/26/2020    MPV 9.4 04/26/2020     Radiology Review:   XR hip left:  Demonstrates previous bipolar hemiarthroplasty. Components in stable position without evidence of subsidence, loosening, or failure.   The hip is well located    IMPRESSION:  Status post left hip hemiarthroplasty    PLAN:  Offered more physical therapy which patient declined  Discussed and outlined home exercise program which is realistic for patient  He can continue with activities as tolerated, he has no restrictions  Can follow-up with me as needed    Electronically Signed By  Tali Velasquez D.O.  8/4/2020

## 2020-08-20 ENCOUNTER — TELEPHONE (OUTPATIENT)
Dept: FAMILY MEDICINE CLINIC | Age: 80
End: 2020-08-20

## 2020-08-20 NOTE — TELEPHONE ENCOUNTER
June calling in will you order a handicap placard? He is not walking well since hip replacement in April.  Order pended please call her when ready for

## 2020-08-28 NOTE — TELEPHONE ENCOUNTER
Kristal - Wife  [phone]       She is calling about the Handicap Placard. She did not want it mailed and is asking if she can come out today and pick one up?

## 2020-09-24 ENCOUNTER — OFFICE VISIT (OUTPATIENT)
Dept: FAMILY MEDICINE CLINIC | Age: 80
End: 2020-09-24
Payer: MEDICARE

## 2020-09-24 VITALS
HEART RATE: 52 BPM | SYSTOLIC BLOOD PRESSURE: 148 MMHG | BODY MASS INDEX: 23.91 KG/M2 | WEIGHT: 181.2 LBS | TEMPERATURE: 97.8 F | DIASTOLIC BLOOD PRESSURE: 66 MMHG | OXYGEN SATURATION: 93 %

## 2020-09-24 PROBLEM — C61 PROSTATE CANCER (HCC): Status: ACTIVE | Noted: 2020-09-24

## 2020-09-24 PROCEDURE — 96372 THER/PROPH/DIAG INJ SC/IM: CPT | Performed by: FAMILY MEDICINE

## 2020-09-24 PROCEDURE — 99214 OFFICE O/P EST MOD 30 MIN: CPT | Performed by: FAMILY MEDICINE

## 2020-09-24 RX ORDER — BUPROPION HYDROCHLORIDE 150 MG/1
150 TABLET ORAL EVERY MORNING
Qty: 30 TABLET | Refills: 3 | Status: SHIPPED
Start: 2020-09-24 | End: 2021-01-26

## 2020-09-24 RX ORDER — AMLODIPINE BESYLATE AND BENAZEPRIL HYDROCHLORIDE 10; 20 MG/1; MG/1
1 CAPSULE ORAL DAILY
Qty: 90 CAPSULE | Refills: 1 | Status: SHIPPED
Start: 2020-09-24 | End: 2021-01-26 | Stop reason: SDUPTHER

## 2020-09-24 RX ORDER — METHYLPREDNISOLONE ACETATE 40 MG/ML
40 INJECTION, SUSPENSION INTRA-ARTICULAR; INTRALESIONAL; INTRAMUSCULAR; SOFT TISSUE ONCE
Status: COMPLETED | OUTPATIENT
Start: 2020-09-24 | End: 2020-09-24

## 2020-09-24 RX ORDER — URSODIOL 300 MG/1
300 CAPSULE ORAL 2 TIMES DAILY
Qty: 180 CAPSULE | Refills: 1 | Status: SHIPPED
Start: 2020-09-24 | End: 2021-01-26 | Stop reason: SDUPTHER

## 2020-09-24 RX ORDER — SOTALOL HYDROCHLORIDE 80 MG/1
40 TABLET ORAL 2 TIMES DAILY
Qty: 90 TABLET | Refills: 1 | Status: SHIPPED
Start: 2020-09-24 | End: 2021-01-26

## 2020-09-24 RX ORDER — FUROSEMIDE 20 MG/1
20 TABLET ORAL DAILY
Qty: 30 TABLET | Refills: 3 | Status: SHIPPED
Start: 2020-09-24 | End: 2021-01-26 | Stop reason: SDUPTHER

## 2020-09-24 RX ADMIN — METHYLPREDNISOLONE ACETATE 40 MG: 40 INJECTION, SUSPENSION INTRA-ARTICULAR; INTRALESIONAL; INTRAMUSCULAR; SOFT TISSUE at 10:34

## 2020-09-24 ASSESSMENT — ENCOUNTER SYMPTOMS
SHORTNESS OF BREATH: 0
TROUBLE SWALLOWING: 0
EYE REDNESS: 0
SORE THROAT: 0
COUGH: 0
PHOTOPHOBIA: 0
BACK PAIN: 0
BLOOD IN STOOL: 0
ABDOMINAL PAIN: 0
VOMITING: 0
SINUS PAIN: 0
CONSTIPATION: 0
DIARRHEA: 0
WHEEZING: 0

## 2020-10-26 ENCOUNTER — HOSPITAL ENCOUNTER (OUTPATIENT)
Age: 80
Discharge: HOME | End: 2020-10-28
Payer: MEDICARE

## 2020-10-26 ENCOUNTER — OFFICE VISIT (OUTPATIENT)
Dept: FAMILY MEDICINE CLINIC | Age: 80
End: 2020-10-26
Payer: MEDICARE

## 2020-10-26 VITALS
OXYGEN SATURATION: 98 % | SYSTOLIC BLOOD PRESSURE: 136 MMHG | TEMPERATURE: 97.5 F | BODY MASS INDEX: 21.53 KG/M2 | HEART RATE: 56 BPM | WEIGHT: 163.2 LBS | DIASTOLIC BLOOD PRESSURE: 64 MMHG

## 2020-10-26 LAB
ALBUMIN SERPL-MCNC: 3.7 G/DL (ref 3.5–5.2)
ALP BLD-CCNC: 484 U/L (ref 40–129)
ALT SERPL-CCNC: 34 U/L (ref 0–40)
AMYLASE: 72 U/L (ref 20–100)
ANION GAP SERPL CALCULATED.3IONS-SCNC: 12 MMOL/L (ref 7–16)
AST SERPL-CCNC: 39 U/L (ref 0–39)
BASOPHILS ABSOLUTE: 0.02 E9/L (ref 0–0.2)
BASOPHILS RELATIVE PERCENT: 0.4 % (ref 0–2)
BILIRUB SERPL-MCNC: 1.4 MG/DL (ref 0–1.2)
BUN BLDV-MCNC: 14 MG/DL (ref 8–23)
CALCIUM SERPL-MCNC: 9.8 MG/DL (ref 8.6–10.2)
CHLORIDE BLD-SCNC: 99 MMOL/L (ref 98–107)
CHOLESTEROL, TOTAL: 178 MG/DL (ref 0–199)
CO2: 29 MMOL/L (ref 22–29)
CREAT SERPL-MCNC: 0.9 MG/DL (ref 0.7–1.2)
EOSINOPHILS ABSOLUTE: 0.08 E9/L (ref 0.05–0.5)
EOSINOPHILS RELATIVE PERCENT: 1.6 % (ref 0–6)
GFR AFRICAN AMERICAN: >60
GFR NON-AFRICAN AMERICAN: >60 ML/MIN/1.73
GLUCOSE BLD-MCNC: 89 MG/DL (ref 74–99)
HCT VFR BLD CALC: 45.5 % (ref 37–54)
HDLC SERPL-MCNC: 52 MG/DL
HEMOGLOBIN: 13.9 G/DL (ref 12.5–16.5)
IMMATURE GRANULOCYTES #: 0.03 E9/L
IMMATURE GRANULOCYTES %: 0.6 % (ref 0–5)
LDL CHOLESTEROL CALCULATED: 106 MG/DL (ref 0–99)
LYMPHOCYTES ABSOLUTE: 1.05 E9/L (ref 1.5–4)
LYMPHOCYTES RELATIVE PERCENT: 21.5 % (ref 20–42)
MCH RBC QN AUTO: 27.6 PG (ref 26–35)
MCHC RBC AUTO-ENTMCNC: 30.5 % (ref 32–34.5)
MCV RBC AUTO: 90.5 FL (ref 80–99.9)
MONOCYTES ABSOLUTE: 0.35 E9/L (ref 0.1–0.95)
MONOCYTES RELATIVE PERCENT: 7.2 % (ref 2–12)
NEUTROPHILS ABSOLUTE: 3.35 E9/L (ref 1.8–7.3)
NEUTROPHILS RELATIVE PERCENT: 68.7 % (ref 43–80)
PDW BLD-RTO: 16.1 FL (ref 11.5–15)
PLATELET # BLD: 266 E9/L (ref 130–450)
PMV BLD AUTO: 9.8 FL (ref 7–12)
POTASSIUM SERPL-SCNC: 4.7 MMOL/L (ref 3.5–5)
RBC # BLD: 5.03 E12/L (ref 3.8–5.8)
SODIUM BLD-SCNC: 140 MMOL/L (ref 132–146)
TOTAL PROTEIN: 7.9 G/DL (ref 6.4–8.3)
TRIGL SERPL-MCNC: 98 MG/DL (ref 0–149)
TSH SERPL DL<=0.05 MIU/L-ACNC: 2.92 UIU/ML (ref 0.27–4.2)
VLDLC SERPL CALC-MCNC: 20 MG/DL
WBC # BLD: 4.9 E9/L (ref 4.5–11.5)

## 2020-10-26 PROCEDURE — 80053 COMPREHEN METABOLIC PANEL: CPT

## 2020-10-26 PROCEDURE — 84443 ASSAY THYROID STIM HORMONE: CPT

## 2020-10-26 PROCEDURE — 85025 COMPLETE CBC W/AUTO DIFF WBC: CPT

## 2020-10-26 PROCEDURE — 36415 COLL VENOUS BLD VENIPUNCTURE: CPT

## 2020-10-26 PROCEDURE — 99214 OFFICE O/P EST MOD 30 MIN: CPT | Performed by: FAMILY MEDICINE

## 2020-10-26 PROCEDURE — 82150 ASSAY OF AMYLASE: CPT

## 2020-10-26 PROCEDURE — 80061 LIPID PANEL: CPT

## 2020-10-26 PROCEDURE — 96372 THER/PROPH/DIAG INJ SC/IM: CPT | Performed by: FAMILY MEDICINE

## 2020-10-26 RX ORDER — METHYLPREDNISOLONE ACETATE 40 MG/ML
40 INJECTION, SUSPENSION INTRA-ARTICULAR; INTRALESIONAL; INTRAMUSCULAR; SOFT TISSUE ONCE
Status: COMPLETED | OUTPATIENT
Start: 2020-10-26 | End: 2020-10-26

## 2020-10-26 RX ADMIN — METHYLPREDNISOLONE ACETATE 40 MG: 40 INJECTION, SUSPENSION INTRA-ARTICULAR; INTRALESIONAL; INTRAMUSCULAR; SOFT TISSUE at 10:58

## 2020-10-26 ASSESSMENT — ENCOUNTER SYMPTOMS
SINUS PAIN: 0
VOMITING: 0
COUGH: 0
BACK PAIN: 0
BLOOD IN STOOL: 0
SHORTNESS OF BREATH: 0
ABDOMINAL PAIN: 0
CONSTIPATION: 0
TROUBLE SWALLOWING: 0
WHEEZING: 0
DIARRHEA: 0
SORE THROAT: 0
PHOTOPHOBIA: 0
EYE REDNESS: 0

## 2020-10-26 NOTE — PROGRESS NOTES
OFFICE NOTE    10/26/20  Name: Olivia Montero  [facility]:   Sex:male   Age:80 y.o. SUBJECTIVE  Chief Complaint   Patient presents with    Congestive Heart Failure    Depression    Weight Loss     dtr feels attributed to lasix, states lost 17lb first week       Patient presents for routine follow up. Continues with weight loss, daughter attributes this to water weight pulled off by Lasix, states he lost 17lb in one week after beginning. Daughter reports she has noticed improved breathing and less fatigued. Wife still feels he is grumpy and doesn't have much to do with his time      Review of Systems   Constitutional: Negative for appetite change, fever and unexpected weight change. Appetite fair. Weight seems to have stabilized after initial diuresis   HENT: Negative for congestion, ear pain, hearing loss, sinus pain, sore throat and trouble swallowing. Eyes: Negative for photophobia, redness and visual disturbance. Respiratory: Negative for cough, shortness of breath and wheezing. Cardiovascular: Negative for chest pain, palpitations and leg swelling. Gastrointestinal: Negative for abdominal pain, blood in stool, constipation, diarrhea and vomiting. Endocrine: Negative for cold intolerance, polydipsia and polyuria. Genitourinary: Negative for difficulty urinating, genital sores, hematuria and urgency. Musculoskeletal: Positive for arthralgias. Negative for back pain and joint swelling. Skin: Negative for pallor and rash. Allergic/Immunologic: Negative for food allergies. Neurological: Negative for dizziness, tremors, seizures, syncope, numbness and headaches. Hematological: Negative for adenopathy. Does not bruise/bleed easily. Psychiatric/Behavioral: Negative for agitation, dysphoric mood, hallucinations and sleep disturbance.             Current Outpatient Medications:     amLODIPine-benazepril (LOTREL) 10-20 MG per capsule, Take 1 capsule by mouth daily, Disp: 90 capsule, Rfl: 1    apixaban (ELIQUIS) 5 MG TABS tablet, Take 1 tablet by mouth 2 times daily, Disp: 60 tablet, Rfl: 5    sotalol (BETAPACE) 80 MG tablet, Take 0.5 tablets by mouth 2 times daily, Disp: 90 tablet, Rfl: 1    ursodiol (ACTIGALL) 300 MG capsule, Take 1 capsule by mouth 2 times daily, Disp: 180 capsule, Rfl: 1    buPROPion (WELLBUTRIN XL) 150 MG extended release tablet, Take 1 tablet by mouth every morning, Disp: 30 tablet, Rfl: 3    furosemide (LASIX) 20 MG tablet, Take 1 tablet by mouth daily With evening meal, Disp: 30 tablet, Rfl: 3    Handicap Placard MISC, by Does not apply route 5 years.  OA as diagnosis, Disp: 2 each, Rfl: 0    leuprolide acetate, 6 Month, (ELIGARD) 45 MG injection, Inject 45 mg into the skin once One injection every 6 months, Disp: , Rfl:     Multiple Vitamins-Minerals (THERAPEUTIC MULTIVITAMIN-MINERALS) tablet, Take 1 tablet by mouth daily, Disp: , Rfl:   Allergies   Allergen Reactions    Flecainide Dermatitis    Zoloft [Sertraline]        Past Medical History:   Diagnosis Date    Arthritis     Knees    Atrial fibrillation (HCC)     [facility]     skin cancer; Prostate cancer     CHF (congestive heart failure) ([facility]     Gallstone pancreatitis     H/O esophagogastroduodenoscopy 02/2013    dr Taina Soler no Hpylori, or Howell's     H/O ETOH abuse     sober for several years    Hypertension     MDRO (multiple drug resistant organisms) resistance     Pacemaker     REMOVED 2018 due to infection     Past Surgical History:   Procedure Laterality Date    ABDOMEN SURGERY      CATARACT REMOVAL Bilateral     CHOLECYSTECTOMY      COLONOSCOPY  06/11/2016    additional dates: 2003,02/28/2013    ENDOSCOPY, COLON, DIAGNOSTIC      ERCP      and ston removal 03/2007    EYE SURGERY Bilateral     cataracts    HIP SURGERY Left 4/24/2020    HIP HEMIARTHROPLASTY -- HILARY performed by Camilla Quesada DO at [facility] OTHER SURGICAL HISTORY      cardioversion    PACEMAKER PLACEMENT      LASER LEAD EXTRACTION  03/02/2018    SKIN BIOPSY      TONSILLECTOMY       Family History   Problem Relation Age of Onset    Hypertension Father     Heart Disease Father     Emphysema Father     Coronary Art Dis Mother     Osteoporosis Mother      Social History     Tobacco History     Smoking Status  Never Smoker    Smokeless Tobacco Use  Never Used          Alcohol History     Alcohol Use Status  No          Drug Use     Drug Use Status  No          Sexual Activity     Sexually Active  Not Currently Partners  Female              OBJECTIVE  Vitals:    10/26/20 0912   BP: 136/64   Site: Right Upper Arm   Position: Sitting   Pulse: 56   Temp: 97.5 °F (36.4 °C)   TempSrc: Temporal   SpO2: 98%   Weight: 163 lb 3.2 oz (74 kg)        Body mass index is 21.53 kg/m². Patient is normal weight    Orders Placed This Encounter   Procedures    CBC Auto Differential     Standing Status:   Future     Number of Occurrences:   1     Standing Expiration Date:   10/26/2021    Comprehensive Metabolic Panel     Standing Status:   Future     Number of Occurrences:   1     Standing Expiration Date:   10/26/2021    Lipid Panel     Standing Status:   Future     Number of Occurrences:   1     Standing Expiration Date:   10/26/2021     Order Specific Question:   Is Patient Fasting?/# of Hours     Answer:   fasting    TSH without Reflex     Standing Status:   Future     Number of Occurrences:   1     Standing Expiration Date:   10/26/2021    AMYLASE     Standing Status:   Future     Number of Occurrences:   1     Standing Expiration Date:   10/26/2021        EXAM   Physical Exam  Vitals signs and nursing note reviewed. Constitutional:       Appearance: Normal appearance. He is well-developed and normal weight. HENT:      Right Ear: Tympanic membrane, ear canal and external ear normal.      Left Ear: Tympanic membrane, ear canal and external ear normal.      Nose: Nose normal. No congestion.    Eyes: General: No scleral icterus. Conjunctiva/sclera: Conjunctivae normal.      Pupils: Pupils are equal, round, and reactive to light. Neck:      Musculoskeletal: Normal range of motion and neck supple. Thyroid: No thyroid mass or thyromegaly. Vascular: No carotid bruit or JVD. Trachea: Trachea normal.   Cardiovascular:      Rate and Rhythm: Normal rate and regular rhythm. Pulses: Normal pulses. Heart sounds: Normal heart sounds. No murmur. No gallop. Pulmonary:      Effort: Pulmonary effort is normal.      Breath sounds: Normal breath sounds. No wheezing, rhonchi or rales. Abdominal:      General: Bowel sounds are normal. There is no distension. Palpations: Abdomen is soft. There is no mass. Tenderness: There is no abdominal tenderness. There is no guarding. Hernia: No hernia is present. Musculoskeletal: Normal range of motion. Right lower leg: No edema. Left lower leg: No edema. Comments: Moderately severe OA of knees some in hands   Lymphadenopathy:      Cervical: No cervical adenopathy. Skin:     General: Skin is warm and dry. Capillary Refill: Capillary refill takes less than 2 seconds. Coloration: Skin is not jaundiced. Findings: No bruising or rash. Neurological:      General: No focal deficit present. Mental Status: He is alert and oriented to person, place, and time. Motor: No abnormal muscle tone. Psychiatric:         Mood and Affect: Mood normal.         Behavior: Behavior normal.      Comments: Eye contact poor           Sam was seen today for congestive heart failure, depression and weight loss. Diagnoses and all orders for this visit:    Chronic diastolic heart failure (HCC)  -     CBC Auto Differential; Future  -     Comprehensive Metabolic Panel; Future  -     Lipid Panel; Future  -     TSH without Reflex; Future  -     AMYLASE;  Future  Some improvement in symptoms with patient no longer in AFIb and after diuresis. Will try diuretic qod  Atrial fibrillation, unspecified type ([address]  Seems to be in NSR on BB  Prostate cancer ([address]   on Lupron injections. Side effects not severe  Seasonal allergic rhinitis due to fungal spores  -     methylPREDNISolone acetate (DEPO-MEDROL) injection 40 mg    Fatigue due to depression  Was started on Wellbutrin. Seems better to me. Will continue this for now. Wife questions need for this. Return in about 3 months (around 1/26/2021). Electronically signed by Ilene Moreno MD on 10/26/20 at 10:04 AM EDT    I have personally reviewed and updated the chief complaint, HPI, Past Medical, Family and Social History, as well as the above Review of Systems.

## 2020-10-28 ENCOUNTER — TELEPHONE (OUTPATIENT)
Dept: FAMILY MEDICINE CLINIC | Age: 80
End: 2020-10-28

## 2020-10-28 NOTE — TELEPHONE ENCOUNTER
----- Message from Viky Lynn, Kenji Danielle Charla sent at 10/28/2020 12:23 PM EDT -----  Notified patient. Patient verbalized understanding. Pt is in agreement with having the [address] done at [place] office. Please place order.

## 2020-12-16 ENCOUNTER — TELEPHONE (OUTPATIENT)
Dept: FAMILY MEDICINE CLINIC | Age: 80
End: 2020-12-16

## 2020-12-16 NOTE — TELEPHONE ENCOUNTER
Wife, Kristal called stating that she feel pt is having side effects from the Wellbutrin. She reports him having increased tremors and a fever off and on. She states she read about the side effects and 1 in 10 people have these symptoms. She would like him to go off of the medication. Please advise.

## 2020-12-28 ENCOUNTER — TELEPHONE (OUTPATIENT)
Dept: FAMILY MEDICINE CLINIC | Age: 80
End: 2020-12-28

## 2020-12-28 NOTE — TELEPHONE ENCOUNTER
Kristal - Wife   [phone]  She is calling to tell you that since he stopped the Wellbutrin all the side effects have gone away. He no longer has the low grade fever and tremors.

## 2020-12-29 NOTE — TELEPHONE ENCOUNTER
Although I am not entirely convinced wellbutrin caused fever or tremors, lets leave him off this for now.

## 2021-01-26 ENCOUNTER — OFFICE VISIT (OUTPATIENT)
Dept: FAMILY MEDICINE CLINIC | Age: 81
End: 2021-01-26
Payer: MEDICARE

## 2021-01-26 VITALS
DIASTOLIC BLOOD PRESSURE: 86 MMHG | OXYGEN SATURATION: 96 % | WEIGHT: 175 LBS | HEIGHT: 73 IN | SYSTOLIC BLOOD PRESSURE: 128 MMHG | BODY MASS INDEX: 23.19 KG/M2 | TEMPERATURE: 97.6 F | HEART RATE: 70 BPM

## 2021-01-26 DIAGNOSIS — I48.19 PERSISTENT ATRIAL FIBRILLATION (HCC): ICD-10-CM

## 2021-01-26 DIAGNOSIS — I10 ESSENTIAL HYPERTENSION: ICD-10-CM

## 2021-01-26 DIAGNOSIS — Z00.00 ROUTINE GENERAL MEDICAL EXAMINATION AT A HEALTH CARE FACILITY: Primary | ICD-10-CM

## 2021-01-26 DIAGNOSIS — I50.32 CHRONIC DIASTOLIC HEART FAILURE (HCC): Chronic | ICD-10-CM

## 2021-01-26 PROCEDURE — 99214 OFFICE O/P EST MOD 30 MIN: CPT | Performed by: FAMILY MEDICINE

## 2021-01-26 PROCEDURE — G0438 PPPS, INITIAL VISIT: HCPCS | Performed by: FAMILY MEDICINE

## 2021-01-26 RX ORDER — FUROSEMIDE 20 MG/1
20 TABLET ORAL DAILY
Qty: 30 TABLET | Refills: 3 | Status: SHIPPED
Start: 2021-01-26 | End: 2021-06-30

## 2021-01-26 RX ORDER — URSODIOL 300 MG/1
300 CAPSULE ORAL 2 TIMES DAILY
Qty: 180 CAPSULE | Refills: 1 | Status: SHIPPED
Start: 2021-01-26 | End: 2021-08-04 | Stop reason: SDUPTHER

## 2021-01-26 RX ORDER — AMLODIPINE BESYLATE AND BENAZEPRIL HYDROCHLORIDE 10; 20 MG/1; MG/1
1 CAPSULE ORAL DAILY
Qty: 90 CAPSULE | Refills: 1 | Status: SHIPPED
Start: 2021-01-26 | End: 2021-09-24 | Stop reason: SDUPTHER

## 2021-01-26 ASSESSMENT — PATIENT HEALTH QUESTIONNAIRE - PHQ9
SUM OF ALL RESPONSES TO PHQ QUESTIONS 1-9: 1
SUM OF ALL RESPONSES TO PHQ QUESTIONS 1-9: 1
SUM OF ALL RESPONSES TO PHQ9 QUESTIONS 1 & 2: 1

## 2021-01-26 ASSESSMENT — ENCOUNTER SYMPTOMS
CONSTIPATION: 0
PHOTOPHOBIA: 0
SHORTNESS OF BREATH: 0
SINUS PAIN: 0
BLOOD IN STOOL: 0
ABDOMINAL PAIN: 0
TROUBLE SWALLOWING: 0
WHEEZING: 0
VOMITING: 0
SORE THROAT: 0
DIARRHEA: 0
BACK PAIN: 0
COUGH: 0
EYE REDNESS: 0

## 2021-01-26 ASSESSMENT — LIFESTYLE VARIABLES
HOW OFTEN DO YOU HAVE A DRINK CONTAINING ALCOHOL: 0
AUDIT-C TOTAL SCORE: INCOMPLETE
HOW OFTEN DO YOU HAVE A DRINK CONTAINING ALCOHOL: NEVER
AUDIT TOTAL SCORE: INCOMPLETE

## 2021-01-26 NOTE — PATIENT INSTRUCTIONS
Personalized Preventive Plan for Everardo Fox - 1/26/2021  Medicare offers a range of preventive health benefits. Some of the tests and screenings are paid in full while other may be subject to a deductible, co-insurance, and/or copay. Some of these benefits include a comprehensive review of your medical history including lifestyle, illnesses that may run in your family, and various assessments and screenings as appropriate. After reviewing your medical record and screening and assessments performed today your provider may have ordered immunizations, labs, imaging, and/or referrals for you. A list of these orders (if applicable) as well as your Preventive Care list are included within your After Visit Summary for your review. Other Preventive Recommendations:    · A preventive eye exam performed by an eye specialist is recommended every 1-2 years to screen for glaucoma; cataracts, macular degeneration, and other eye disorders. · A preventive dental visit is recommended every 6 months. · Try to get at least 150 minutes of exercise per week or 10,000 steps per day on a pedometer . · Order or download the FREE \"Exercise & Physical Activity: Your Everyday Guide\" from [employer]. Call 6-765.967.4859 or search [employer] online. · You need 5120-3494 mg of calcium and 0071-8206 IU of vitamin D per day. It is possible to meet your calcium requirement with diet alone, but a vitamin D supplement is usually necessary to meet this goal.  · When exposed to the sun, use a sunscreen that protects against both UVA and UVB radiation with an SPF of 30 or greater. Reapply every 2 to 3 hours or after sweating, drying off with a towel, or swimming. · Always wear a seat belt when traveling in a car. Always wear a helmet when riding a bicycle or motorcycle.

## 2021-01-26 NOTE — PROGRESS NOTES
Laterality Date    ABDOMEN SURGERY      CATARACT REMOVAL Bilateral     CHOLECYSTECTOMY      COLONOSCOPY  06/11/2016    additional dates: 2003,02/28/2013    ENDOSCOPY, COLON, DIAGNOSTIC      ERCP      and ston removal 03/2007    EYE SURGERY Bilateral     cataracts    HIP SURGERY Left 4/24/2020    HIP HEMIARTHROPLASTY -- HILARY performed by Carmen Osuna DO at [facility] OTHER SURGICAL HISTORY      cardioversion    PACEMAKER PLACEMENT      LASER LEAD EXTRACTION  03/02/2018    SKIN BIOPSY      TONSILLECTOMY           Family History   Problem Relation Age of Onset    Hypertension Father     Heart Disease Father     Emphysema Father     Coronary Art Dis Mother     Osteoporosis Mother        CareTeam (Including outside providers/suppliers regularly involved in providing care):   Patient Care Team:  Rebel Laws MD as PCP - Jenney Rubinstein, MD as PCP - [facility] Empaneled Provider    Wt Readings from Last 3 Encounters:   01/26/21 175 lb (79.4 kg)   10/26/20 163 lb 3.2 oz (74 kg)   09/24/20 181 lb 3.2 oz (82.2 kg)     Vitals:    01/26/21 0941   BP: 128/86   Site: Right Upper Arm   Position: Sitting   Pulse: 70   Temp: 97.6 °F (36.4 °C)   TempSrc: Temporal   SpO2: 96%   Weight: 175 lb (79.4 kg)   Height: 6' 1\" (1.854 m)     Body mass index is 23.09 kg/m². Based upon direct observation of the patient, evaluation of cognition reveals recent and remote memory intact. Patient's complete Health Risk Assessment and screening values have been reviewed and are found in Flowsheets. The following problems were reviewed today and where indicated follow up appointments were made and/or referrals ordered.     Positive Risk Factor Screenings with Interventions:     Fall Risk:  2 or more falls in past year?: no  Fall with injury in past year?: (!) yes  Fall Risk Interventions:    · Home safety tips provided          General Health and ACP:  General  In general, how would you say your health is?: Fair  In the past 7 days, have you experienced any of the following? New or Increased Pain, New or Increased Fatigue, Loneliness, Social Isolation, Stress or Anger?: (!) Stress  Do you get the social and emotional support that you need?: Yes  Do you have a Living Will?: Yes  Advance Directives     Power of  Living Will ACP-Advance Directive ACP-Power of     Not on File Filed on 03/16/17 Filed Not on File      General Health Risk Interventions:  · Stress: regular exercise recommended- 3-5 times per week, 30-45 minutes per session, relaxation techniques discussed    Health Habits/Nutrition:  Health Habits/Nutrition  Do you exercise for at least 20 minutes 2-3 times per week?: (!) No  Have you lost any weight without trying in the past 3 months?: (!) Yes  Do you eat fewer than 2 meals per day?: No  Have you seen a dentist within the past year?: Yes  Body mass index: 23.09  Health Habits/Nutrition Interventions:  · discussed home exercises from hip surgery protocol    Hearing/Vision:  No exam data present  Hearing/Vision  Do you or your family notice any trouble with your hearing?: (!) Yes  Do you have difficulty driving, watching TV, or doing any of your daily activities because of your eyesight?: No  Have you had an eye exam within the past year?: Yes  Hearing/Vision Interventions:  · Hearing concerns:  patient declines any further evaluation/treatment for hearing issues    Safety:  Safety  Do you have working smoke detectors?: Yes  Have all throw rugs been removed or fastened?: (!) No  Do you have non-slip mats or surfaces in all bathtubs/showers?: Yes  Do all of your stairways have a railing or banister?: Yes  Are your doorways, halls and stairs free of clutter?: Yes  Do you always fasten your seatbelt when you are in a car?: Yes  Safety Interventions:  · Home safety tips provided    ADL:  ADLs  In the past 7 days, did you need help from others to perform any of the following everyday activities? Eating, dressing, grooming, bathing, toileting, or walking/balance?: None  In the past 7 days, did you need help from others to take care of any of the following? Laundry, housekeeping, banking/finances, shopping, telephone use, food preparation, transportation, or taking medications?: (!) Laundry  ADL Interventions:  · Patient declines any further evaluation/treatment for this issue    Personalized Preventive Plan   Current Health Maintenance Status  Immunization History   Administered Date(s) Administered    Influenza Virus Vaccine 12/14/2006, 09/25/2008, 01/21/2010, 11/21/2010, 09/26/2011, 09/25/2012, 09/24/2013, 10/03/2014, 08/25/2017, 10/02/2017    Influenza, High Dose (Fluzone 65 yrs and older) 11/08/2018    Influenza, Triv, inactivated, subunit, adjuvanted, IM (Fluad 65 yrs and older) 12/05/2019    Pneumococcal Conjugate 13-valent ([id number]) 10/02/2017    Pneumococcal Polysaccharide ([id number]) 04/12/2005, 10/02/2015, 08/26/2016        Health Maintenance   Topic Date Due    Hepatitis A vaccine (1 of 2 - Risk 2-dose series) 07/15/1941    COVID-19 Vaccine (1 of 2) 07/15/1956    Hepatitis B vaccine (1 of 3 - Risk 3-dose series) 07/15/1959    DTaP/Tdap/Td vaccine (1 - Tdap) 07/15/1959    Shingles Vaccine (1 of 2) 07/15/1990    Annual Wellness Visit (AWV)  06/20/2019    PSA counseling  03/19/2021    Potassium monitoring  10/26/2021    Creatinine monitoring  10/26/2021    Flu vaccine  Completed    Pneumococcal 65+ years Vaccine  Completed    Hib vaccine  Aged Out    Meningococcal (ACWY) vaccine  Aged Out     Recommendations for [place] Due: see orders and patient instructions/AVS.  . Recommended screening schedule for the next 5-10 years is provided to the patient in written form: see Patient Servando Kenyon was seen today for medicare awv.     Diagnoses and all orders for this visit:    Routine general medical examination at a health care facility    Chronic diastolic heart failure (HCC)  -     furosemide (LASIX) 20 MG tablet; Take 1 tablet by mouth daily With evening meal    Persistent atrial fibrillation (HCC)  -     apixaban (ELIQUIS) 5 MG TABS tablet; Take 1 tablet by mouth 2 times daily    Essential hypertension  -     amLODIPine-benazepril (LOTREL) 10-20 MG per capsule; Take 1 capsule by mouth daily    Other orders  -     ursodiol (ACTIGALL) 300 MG capsule; Take 1 capsule by mouth 2 times daily             OFFICE NOTE    21  Name: Wilma [id number]   Sex:male   Age:80 y.o. SUBJECTIVE  Chief Complaint   Patient presents with    Medicare AW       HPI Comes in for regular checkup. Since last visit fell in garage (tripped over extension chord and broke left hip at femoral neck which was pinned. Had PT is not doing regular home exercises at this time. Leg still bothers him    Review of Systems   Constitutional: Positive for activity change and fatigue. Negative for appetite change, fever and unexpected weight change. HENT: Negative for congestion, ear pain, hearing loss, sinus pain, sore throat and trouble swallowing. Eyes: Negative for photophobia, redness and visual disturbance. Respiratory: Negative for cough, shortness of breath and wheezing. Cardiovascular: Positive for leg swelling. Negative for chest pain and palpitations. Gastrointestinal: Negative for abdominal pain, blood in stool, constipation, diarrhea and vomiting. Endocrine: Negative for cold intolerance, polydipsia and polyuria. Nocturia unexplained   Genitourinary: Positive for urgency. Negative for difficulty urinating, genital sores and hematuria. Musculoskeletal: Positive for arthralgias. Negative for back pain and joint swelling. Allergic/Immunologic: Negative for food allergies. Neurological: Negative for dizziness, tremors, seizures, syncope, numbness and headaches. Hematological: Negative for adenopathy. Does not bruise/bleed easily. Psychiatric/Behavioral: Negative for agitation, dysphoric mood, hallucinations and sleep disturbance. The patient is nervous/anxious. All other systems reviewed and are negative. Current Outpatient Medications:     ursodiol (ACTIGALL) 300 MG capsule, Take 1 capsule by mouth 2 times daily, Disp: 180 capsule, Rfl: 1    furosemide (LASIX) 20 MG tablet, Take 1 tablet by mouth daily With evening meal, Disp: 30 tablet, Rfl: 3    apixaban (ELIQUIS) 5 MG TABS tablet, Take 1 tablet by mouth 2 times daily, Disp: 60 tablet, Rfl: 5    amLODIPine-benazepril (LOTREL) 10-20 MG per capsule, Take 1 capsule by mouth daily, Disp: 90 capsule, Rfl: 1    Handicap Placard MISC, by Does not apply route 5 years.  OA as diagnosis, Disp: 2 each, Rfl: 0    leuprolide acetate, 6 Month, (ELIGARD) 45 MG injection, Inject 45 mg into the skin once One injection every 6 months, Disp: , Rfl:     Multiple Vitamins-Minerals (THERAPEUTIC MULTIVITAMIN-MINERALS) tablet, Take 1 tablet by mouth daily, Disp: , Rfl:   Allergies   Allergen Reactions    Flecainide Dermatitis    Zoloft [Sertraline]        Past Medical History:   Diagnosis Date    Arthritis     Knees    Atrial fibrillation (HCC)     Cancer ([facility] [address]     skin cancer; Prostate cancer     CHF (congestive heart failure) ([facility] [address]     Gallstone pancreatitis     H/O esophagogastroduodenoscopy 02/2013    dr [facility] no Hpylori, or Howell's     H/O ETOH abuse     sober for several years    Hypertension     MDRO (multiple drug resistant organisms) resistance     Pacemaker     REMOVED 2018 due to infection     Past Surgical History:   Procedure Laterality Date    ABDOMEN SURGERY      CATARACT REMOVAL Bilateral     CHOLECYSTECTOMY      COLONOSCOPY  06/11/2016    additional dates: 2003,02/28/2013    ENDOSCOPY, COLON, DIAGNOSTIC      ERCP      and ston removal 03/2007    EYE SURGERY Bilateral     cataracts    HIP SURGERY Left 4/24/2020    HIP HEMIARTHROPLASTY -- [place] performed by Silvana Saint, DO at [facility] OTHER SURGICAL HISTORY      cardioversion    PACEMAKER PLACEMENT      LASER LEAD EXTRACTION  03/02/2018    SKIN BIOPSY      TONSILLECTOMY       Family History   Problem Relation Age of Onset    Hypertension Father     Heart Disease Father     Emphysema Father     Coronary Art Dis Mother     Osteoporosis Mother      Social History     Tobacco History     Smoking Status  Never Smoker    Smokeless Tobacco Use  Never Used          Alcohol History     Alcohol Use Status  No          Drug Use     Drug Use Status  No          Sexual Activity     Sexually Active  Not Currently Partners  Female                OBJECTIVE  Vitals:    01/26/21 0941   BP: 128/86   Site: Right Upper Arm   Position: Sitting   Pulse: 70   Temp: 97.6 °F (36.4 °C)   TempSrc: Temporal   SpO2: 96%   Weight: 175 lb (79.4 kg)   Height: 6' 1\" (1.854 m)        Body mass index is 23.09 kg/m². No orders of the defined types were placed in this encounter. EXAM   Physical Exam  Vitals signs and nursing note reviewed. Constitutional:       Appearance: He is well-developed and normal weight. HENT:      Right Ear: Tympanic membrane, ear canal and external ear normal.      Left Ear: Tympanic membrane, ear canal and external ear normal.      Nose: Nose normal.      Mouth/Throat:      Pharynx: Oropharynx is clear. Eyes:      General: No scleral icterus. Conjunctiva/sclera: Conjunctivae normal.      Pupils: Pupils are equal, round, and reactive to light. Neck:      Musculoskeletal: Normal range of motion and neck supple. Thyroid: No thyroid mass or thyromegaly. Vascular: No carotid bruit or JVD. Trachea: Trachea normal.   Cardiovascular:      Rate and Rhythm: Normal rate and regular rhythm. Heart sounds: Normal heart sounds. No murmur. No gallop. Pulmonary:      Effort: Pulmonary effort is normal.      Breath sounds: Normal breath sounds.  No wheezing, rhonchi or rales.   Abdominal:      General: Bowel sounds are normal. There is no distension. Palpations: Abdomen is soft. There is no mass. Tenderness: There is no abdominal tenderness. There is no guarding. Hernia: No hernia is present. Genitourinary:     Comments: H/o prostate cancer now on Lupron injections  Musculoskeletal: Normal range of motion. General: Tenderness present. No swelling. Right lower leg: Edema present. Left lower leg: No edema. Lymphadenopathy:      Cervical: No cervical adenopathy. Skin:     General: Skin is warm and dry. Capillary Refill: Capillary refill takes less than 2 seconds. Coloration: Skin is not jaundiced. Findings: No bruising or rash. Neurological:      General: No focal deficit present. Mental Status: He is alert and oriented to person, place, and time. Motor: No abnormal muscle tone. Psychiatric:         Mood and Affect: Mood normal.         Behavior: Behavior normal.           Sam was seen today for medicare awv. Diagnoses and all orders for this visit:    Routine general medical examination at a health care facility   AWV completed earlier    Chronic diastolic heart failure (HCC)  -     furosemide (LASIX) 20 MG tablet; Take 1 tablet by mouth daily With evening meal  Seems compensated. Persistent atrial fibrillation (HCC)  -     apixaban (ELIQUIS) 5 MG TABS tablet; Take 1 tablet by mouth 2 times daily  Was taken off Sotalol now on Cardarone. TFTs were normal  Essential hypertension  -     amLODIPine-benazepril (LOTREL) 10-20 MG per capsule; Take 1 capsule by mouth daily  Normotensive, no changes made    Other orders  -     ursodiol (ACTIGALL) 300 MG capsule; Take 1 capsule by mouth 2 times daily  There was good deal of discussion about Covid 19 vaccine. Wife prefers he does not receive and believes it has not been tested enough. I gently disagreed. Probably will (and should) get this.  She most likely won't  Do not believe is clinically depressed. Eye contact not very good, was on Wellbutrin stopped for fever, tremor which would be unusual reaction to this med        Return in 4 months (on 5/26/2021) for Medicare Annual Wellness Visit in 1 year.     Electronically signed by Keely Romero MD on 1/26/21 at 11:00 AM EST

## 2021-02-22 ENCOUNTER — TELEPHONE (OUTPATIENT)
Dept: FAMILY MEDICINE CLINIC | Age: 81
End: 2021-02-22

## 2021-02-22 ENCOUNTER — OFFICE VISIT (OUTPATIENT)
Dept: FAMILY MEDICINE CLINIC | Age: 81
End: 2021-02-22
Payer: MEDICARE

## 2021-02-22 VITALS
DIASTOLIC BLOOD PRESSURE: 62 MMHG | HEART RATE: 67 BPM | BODY MASS INDEX: 22.64 KG/M2 | OXYGEN SATURATION: 94 % | SYSTOLIC BLOOD PRESSURE: 134 MMHG | TEMPERATURE: 97.8 F | WEIGHT: 171.6 LBS

## 2021-02-22 DIAGNOSIS — R06.09 DYSPNEA ON EXERTION: Primary | ICD-10-CM

## 2021-02-22 DIAGNOSIS — J40 BRONCHITIS: ICD-10-CM

## 2021-02-22 DIAGNOSIS — Z91.81 AT HIGH RISK FOR FALLS: ICD-10-CM

## 2021-02-22 DIAGNOSIS — R09.02 HYPOXEMIA: ICD-10-CM

## 2021-02-22 PROCEDURE — 99213 OFFICE O/P EST LOW 20 MIN: CPT | Performed by: FAMILY MEDICINE

## 2021-02-22 RX ORDER — METHYLPREDNISOLONE 4 MG/1
TABLET ORAL
Qty: 1 KIT | Refills: 0 | Status: SHIPPED | OUTPATIENT
Start: 2021-02-22 | End: 2021-02-28

## 2021-02-22 RX ORDER — DOXYCYCLINE HYCLATE 100 MG
100 TABLET ORAL 2 TIMES DAILY
Qty: 20 TABLET | Refills: 0 | Status: SHIPPED | OUTPATIENT
Start: 2021-02-22 | End: 2021-03-04

## 2021-02-22 NOTE — PROGRESS NOTES
OFFICE NOTE    2/22/21  Name: Ruddy Hill  WTL:[id number]   Sex:male   Age:80 y.o. SUBJECTIVE  Chief Complaint   Patient presents with    Shortness of Breath     all symptoms started 1/31    Chest Congestion    Cough    Other     Eligard 1/25, COVID #1 1/29    Edema     BLE, BUE       HPI received Elegard shot around 1/20 and first Covid a week later. Last 2 times has gotten Eligard has felt as though it caused him to retain water. Unable to lie flat for some time. Saw Urologist for last shot they did not seem interested. Due for next Covid shot on Friday, wife concerned. Thinks his breathing needs checked out    Review of Systems JIANG. No syncope. Orthopnea present. Coughs up mucus, feels can't take a deep breath        Current Outpatient Medications:     doxycycline hyclate (VIBRA-TABS) 100 MG tablet, Take 1 tablet by mouth 2 times daily for 10 days, Disp: 20 tablet, Rfl: 0    methylPREDNISolone (MEDROL DOSEPACK) 4 MG tablet, Take by mouth., Disp: 1 kit, Rfl: 0    ursodiol (ACTIGALL) 300 MG capsule, Take 1 capsule by mouth 2 times daily, Disp: 180 capsule, Rfl: 1    furosemide (LASIX) 20 MG tablet, Take 1 tablet by mouth daily With evening meal, Disp: 30 tablet, Rfl: 3    apixaban (ELIQUIS) 5 MG TABS tablet, Take 1 tablet by mouth 2 times daily, Disp: 60 tablet, Rfl: 5    amLODIPine-benazepril (LOTREL) 10-20 MG per capsule, Take 1 capsule by mouth daily, Disp: 90 capsule, Rfl: 1    Handicap Placard MISC, by Does not apply route 5 years.  OA as diagnosis, Disp: 2 each, Rfl: 0    leuprolide acetate, 6 Month, (ELIGARD) 45 MG injection, Inject 45 mg into the skin once One injection every 6 months, Disp: , Rfl:     Multiple Vitamins-Minerals (THERAPEUTIC MULTIVITAMIN-MINERALS) tablet, Take 1 tablet by mouth daily, Disp: , Rfl:   Allergies   Allergen Reactions    Flecainide Dermatitis    Zoloft [Sertraline]        Past Medical History:   Diagnosis Date    Arthritis     Knees    [facility] fibrillation ([facility] [address]     Cancer ([facility] [address]     skin cancer; Prostate cancer     CHF (congestive heart failure) ([facility] [address]     Gallstone pancreatitis     H/O esophagogastroduodenoscopy 02/2013    dr Keke Valladares no Hpylori, or Howell's     H/O ETOH abuse     sober for several years    Hypertension     MDRO (multiple drug resistant organisms) resistance     Pacemaker     REMOVED 2018 due to infection     Past Surgical History:   Procedure Laterality Date    ABDOMEN SURGERY      CATARACT REMOVAL Bilateral     CHOLECYSTECTOMY      COLONOSCOPY  06/11/2016    additional dates: 2003,02/28/2013    ENDOSCOPY, COLON, DIAGNOSTIC      ERCP      and ston removal 03/2007    EYE SURGERY Bilateral     cataracts    HIP SURGERY Left 4/24/2020    HIP HEMIARTHROPLASTY -- HILARY performed by Damon Parks DO at [facility] OTHER SURGICAL HISTORY      cardioversion    PACEMAKER PLACEMENT      LASER LEAD EXTRACTION  03/02/2018    SKIN BIOPSY      TONSILLECTOMY       Family History   Problem Relation Age of Onset    Hypertension Father     Heart Disease Father     Emphysema Father     Coronary Art Dis Mother     Osteoporosis Mother      Social History     Tobacco History     Smoking Status  Never Smoker    Smokeless Tobacco Use  Never Used          Alcohol History     Alcohol Use Status  No          Drug Use     Drug Use Status  No          Sexual Activity     Sexually Active  Not Currently Partners  Female                OBJECTIVE  Vitals:    02/22/21 1430 02/22/21 1436   BP: 134/62    Site: Right Upper Arm    Position: Sitting    Pulse: 67    Temp: 97.8 °F (36.6 °C)    TempSrc: Temporal    SpO2: (!) 80% 94%   Weight: 171 lb 9.6 oz (77.8 kg)         Body mass index is 22.64 kg/m². Orders Placed This Encounter   Procedures    XR CHEST (2 VW)     Standing Status:   Future     Number of Occurrences:   1     Standing Expiration Date:   2/22/2022        EXAM   Physical Exam  Vitals signs and nursing note reviewed.    Constitutional: Appearance: Normal appearance. He is normal weight. HENT:      Right Ear: Tympanic membrane normal.      Left Ear: Tympanic membrane normal.      Nose: Congestion present. Mouth/Throat:      Pharynx: Oropharynx is clear. No posterior oropharyngeal erythema. Eyes:      Conjunctiva/sclera: Conjunctivae normal.   Neck:      Vascular: No carotid bruit. Cardiovascular:      Rate and Rhythm: Normal rate and regular rhythm. Pulses: Normal pulses. Pulmonary:      Effort: Pulmonary effort is normal.      Breath sounds: Normal breath sounds. No wheezing or rales. Comments: Poor inspiratory incursion  Abdominal:      General: Bowel sounds are normal. There is no distension. Tenderness: There is no abdominal tenderness. Lymphadenopathy:      Cervical: No cervical adenopathy. Skin:     Coloration: Skin is not jaundiced. Findings: No bruising or rash. Neurological:      General: No focal deficit present. Mental Status: He is alert and oriented to person, place, and time. Sensory: No sensory deficit. Motor: Weakness present. Coordination: Coordination normal.      Gait: Gait normal.   Psychiatric:         Mood and Affect: Mood normal.           Sam was seen today for shortness of breath, chest congestion, cough, other and edema. Diagnoses and all orders for this visit:    Dyspnea on exertion  -     XR CHEST (2 VW); Future  -     doxycycline hyclate (VIBRA-TABS) 100 MG tablet; Take 1 tablet by mouth 2 times daily for 10 days  -     methylPREDNISolone (MEDROL DOSEPACK) 4 MG tablet; Take by mouth. Dropped from 94% to 84% walking to x-ray 25-30 yards. Hypoxemia  -     XR CHEST (2 VW); Future  Meets criteria for O2 would like to see how he does with antibiotic, steroid and incentive spirometer first. . CXR looks like restrictive lung disorder to me, may need to see pulmonologist    At high risk for falls see below. Seems ok with walking but tires easily.  Would be at

## 2021-02-22 NOTE — TELEPHONE ENCOUNTER
June - Wife     She is calling about him getting the second Covid vaccine scheduled for the 25th. She is asking if he should get it after having a reaction to the first one? His reaction was fatigue, fluid retention, and SOB. Its the SOB that she is most worried about. His first shot was about 10 days after getting his Elagard injection, so she is not sure if that was reason for the side effects. He only gets the Elagard every 6 months. What is your recommendation?

## 2021-02-22 NOTE — TELEPHONE ENCOUNTER
A true allergic reaction would be within the first hour of shot. He could take an antihistamine 1 hour before shot if there is a concern. tameka could cause some of the side effects she is talking about.  I would recommend he get the second shot  But wait 1/2 hour before driving away

## 2021-05-24 ENCOUNTER — OFFICE VISIT (OUTPATIENT)
Dept: FAMILY MEDICINE CLINIC | Age: 81
End: 2021-05-24
Payer: MEDICARE

## 2021-05-24 VITALS
OXYGEN SATURATION: 95 % | WEIGHT: 171.4 LBS | SYSTOLIC BLOOD PRESSURE: 136 MMHG | BODY MASS INDEX: 22.61 KG/M2 | DIASTOLIC BLOOD PRESSURE: 82 MMHG | TEMPERATURE: 98 F | HEART RATE: 57 BPM

## 2021-05-24 DIAGNOSIS — I50.32 CHRONIC DIASTOLIC HEART FAILURE (HCC): Chronic | ICD-10-CM

## 2021-05-24 DIAGNOSIS — D49.2 NEOPLASM OF SKIN: ICD-10-CM

## 2021-05-24 DIAGNOSIS — C61 PROSTATE CANCER (HCC): ICD-10-CM

## 2021-05-24 DIAGNOSIS — Z96.649 STATUS POST HIP HEMIARTHROPLASTY: ICD-10-CM

## 2021-05-24 DIAGNOSIS — I48.0 PAROXYSMAL ATRIAL FIBRILLATION (HCC): Primary | ICD-10-CM

## 2021-05-24 DIAGNOSIS — Z79.01 CHRONIC ANTICOAGULATION: Chronic | ICD-10-CM

## 2021-05-24 PROCEDURE — 99214 OFFICE O/P EST MOD 30 MIN: CPT | Performed by: FAMILY MEDICINE

## 2021-05-24 SDOH — ECONOMIC STABILITY: FOOD INSECURITY: WITHIN THE PAST 12 MONTHS, YOU WORRIED THAT YOUR FOOD WOULD RUN OUT BEFORE YOU GOT MONEY TO BUY MORE.: PATIENT DECLINED

## 2021-05-24 SDOH — ECONOMIC STABILITY: FOOD INSECURITY: WITHIN THE PAST 12 MONTHS, THE FOOD YOU BOUGHT JUST DIDN'T LAST AND YOU DIDN'T HAVE MONEY TO GET MORE.: PATIENT DECLINED

## 2021-05-24 ASSESSMENT — ENCOUNTER SYMPTOMS
BACK PAIN: 0
SHORTNESS OF BREATH: 0
PHOTOPHOBIA: 0
SORE THROAT: 0
CONSTIPATION: 0
TROUBLE SWALLOWING: 0
BLOOD IN STOOL: 0
EYE REDNESS: 0
DIARRHEA: 0
SINUS PAIN: 0
WHEEZING: 0
COUGH: 0
VOMITING: 0
ABDOMINAL PAIN: 0

## 2021-05-24 ASSESSMENT — SOCIAL DETERMINANTS OF HEALTH (SDOH): HOW HARD IS IT FOR YOU TO PAY FOR THE VERY BASICS LIKE FOOD, HOUSING, MEDICAL CARE, AND HEATING?: PATIENT DECLINED

## 2021-05-24 NOTE — PROGRESS NOTES
OFFICE NOTE    5/24/21  Name: Isidro Jackson  CRH:[id number]   Sex:male   Age:80 y.o. SUBJECTIVE  Chief Complaint   Patient presents with    Hypertension    Atrial Fibrillation       HPI comes in for checkup and refills. Right hip continues to bother him enough that he uses a waker a year later. Can do somethings in garage, but has had to give up on a lot of what he used to be able to do    Review of Systems   Constitutional: Positive for activity change and fatigue. Negative for appetite change, fever and unexpected weight change. HENT: Negative for congestion, ear pain, hearing loss, sinus pain, sore throat and trouble swallowing. Eyes: Negative for photophobia, redness and visual disturbance. Respiratory: Negative for cough, shortness of breath and wheezing. Cardiovascular: Positive for leg swelling. Negative for chest pain and palpitations. Gastrointestinal: Negative for abdominal pain, blood in stool, constipation, diarrhea and vomiting. Endocrine: Negative for cold intolerance, polydipsia and polyuria. Genitourinary: Positive for urgency. Negative for difficulty urinating, genital sores and hematuria. Musculoskeletal: Positive for arthralgias and gait problem. Negative for back pain and joint swelling. Skin: Negative for pallor and rash. Allergic/Immunologic: Negative for food allergies. Neurological: Negative for dizziness, tremors, seizures, syncope, numbness and headaches. Hematological: Negative for adenopathy. Does not bruise/bleed easily. Psychiatric/Behavioral: Negative for agitation, dysphoric mood, hallucinations and sleep disturbance. The patient is not nervous/anxious.              Current Outpatient Medications:     ursodiol (ACTIGALL) 300 MG capsule, Take 1 capsule by mouth 2 times daily, Disp: 180 capsule, Rfl: 1    furosemide (LASIX) 20 MG tablet, Take 1 tablet by mouth daily With evening meal, Disp: 30 tablet, Rfl: 3    apixaban (ELIQUIS) 5 MG TABS tablet, Take 1 tablet by mouth 2 times daily, Disp: 60 tablet, Rfl: 5    amLODIPine-benazepril (LOTREL) 10-20 MG per capsule, Take 1 capsule by mouth daily, Disp: 90 capsule, Rfl: 1    Handicap Placard MISC, by Does not apply route 5 years.  OA as diagnosis, Disp: 2 each, Rfl: 0    leuprolide acetate, 6 Month, (ELIGARD) 45 MG injection, Inject 45 mg into the skin once One injection every 6 months, Disp: , Rfl:     Multiple Vitamins-Minerals (THERAPEUTIC MULTIVITAMIN-MINERALS) tablet, Take 1 tablet by mouth daily, Disp: , Rfl:   Allergies   Allergen Reactions    Flecainide Dermatitis    Zoloft [Sertraline]        Past Medical History:   Diagnosis Date    Arthritis     Knees    Atrial fibrillation (HCC)     Cancer ([facility] [address]     skin cancer; Prostate cancer     CHF (congestive heart failure) ([facility] [address]     Gallstone pancreatitis     H/O esophagogastroduodenoscopy 02/2013    dr Joanie Guerin no Hpylori, or Howell's     H/O ETOH abuse     sober for several years    Hypertension     MDRO (multiple drug resistant organisms) resistance     Pacemaker     REMOVED 2018 due to infection     Past Surgical History:   Procedure Laterality Date    ABDOMEN SURGERY      CATARACT REMOVAL Bilateral     CHOLECYSTECTOMY      COLONOSCOPY  06/11/2016    additional dates: 2003,02/28/2013    ENDOSCOPY, COLON, DIAGNOSTIC      ERCP      and ston removal 03/2007    EYE SURGERY Bilateral     cataracts    HIP SURGERY Left 4/24/2020    HIP HEMIARTHROPLASTY -- HILARY performed by Rose Greenfield [facility] at [facility] Began OR    OTHER SURGICAL HISTORY      cardioversion    PACEMAKER PLACEMENT      LASER LEAD EXTRACTION  03/02/2018    SKIN BIOPSY      TONSILLECTOMY       Family History   Problem Relation Age of Onset    Hypertension Father     Heart Disease Father     Emphysema Father     Coronary Art Dis Mother     Osteoporosis Mother      Social History     Tobacco History     Smoking Status  Never Smoker    Smokeless Tobacco Use  Never Used Alcohol History     Alcohol Use Status  No          Drug Use     Drug Use Status  No          Sexual Activity     Sexually Active  Not Currently Partners  Female                OBJECTIVE  Vitals:    05/24/21 0836 05/24/21 0915   BP: (!) 142/84 136/82   Site: Left Upper Arm    Position: Sitting    Pulse: 57    Temp: 98 °F (36.7 °C)    TempSrc: Temporal    SpO2: 95%    Weight: 171 lb 6.4 oz (77.7 kg)         Body mass index is 22.61 kg/m². Orders Placed This Encounter   Procedures    External Referral To Dermatology     Referral Priority:   Routine     Referral Type:   Eval and Treat     Referral Reason:   Specialty Services Required     Requested Specialty:   Dermatology     Number of Visits Requested:   1        EXAM   Physical Exam  Vitals and nursing note reviewed. Constitutional:       Appearance: Normal appearance. He is well-developed and normal weight. HENT:      Right Ear: Tympanic membrane, ear canal and external ear normal.      Left Ear: Tympanic membrane, ear canal and external ear normal.      Nose: Nose normal.      Mouth/Throat:      Pharynx: Oropharynx is clear. Eyes:      Conjunctiva/sclera: Conjunctivae normal.      Pupils: Pupils are equal, round, and reactive to light. Neck:      Thyroid: No thyroid mass or thyromegaly. Vascular: No carotid bruit or JVD. Trachea: Trachea normal.   Cardiovascular:      Rate and Rhythm: Normal rate. Rhythm irregular. Pulses: Normal pulses. Heart sounds: Normal heart sounds. No murmur heard. No gallop. Pulmonary:      Effort: Pulmonary effort is normal.      Breath sounds: Normal breath sounds. No wheezing, rhonchi or rales. Abdominal:      General: Bowel sounds are normal. There is no distension. Palpations: Abdomen is soft. There is no mass. Tenderness: There is no abdominal tenderness. There is no guarding. Hernia: No hernia is present. Musculoskeletal:         General: Tenderness present. No swelling. Normal range of motion. Cervical back: Normal range of motion and neck supple. Right lower leg: Edema present. Left lower leg: Edema present. Lymphadenopathy:      Cervical: No cervical adenopathy. Skin:     General: Skin is warm and dry. Coloration: Skin is not jaundiced. Findings: No bruising or rash. Comments: 2 patches left upper arm may be squamous cell skin cancers, though chronically irritated SKs also possible   Neurological:      General: No focal deficit present. Mental Status: He is alert and oriented to person, place, and time. Sensory: No sensory deficit. Motor: Weakness present. No abnormal muscle tone. Coordination: Coordination normal.      Gait: Gait abnormal.   Psychiatric:         Mood and Affect: Mood normal.         Behavior: Behavior normal.           Sam was seen today for hypertension and atrial fibrillation. Diagnoses and all orders for this visit:    Paroxysmal atrial fibrillation ([address]  Rate controlled, on chronic anticoagulation without issues  Prostate cancer ([address]    Chronic diastolic heart failure (HCC)  On Lupron. Wife believes he develops breathing problems after shot and wonders if could get monthly rather than every 6 mos shots. Urology was open to idea and seems reasonalble. Had COVID shots  Status post hip hemiarthroplasty  Dr. Rozina Jiang. Using walker. PT or second opinion would be reasonable. Will consider and let me know which direction  Chronic anticoagulation-Eliquis  No bleeding or other issues from this  Neoplasm of skin  -     External Referral To Dermatology    Will refer to Dr. Saqib Scott      Return in about 4 months (around 9/24/2021).     Electronically signed by Montserrat Alva MD on 5/24/21 at 8:58 AM EDT

## 2021-06-30 DIAGNOSIS — I50.32 CHRONIC DIASTOLIC HEART FAILURE (HCC): Chronic | ICD-10-CM

## 2021-06-30 RX ORDER — FUROSEMIDE 20 MG/1
TABLET ORAL
Qty: 30 TABLET | Refills: 5 | Status: SHIPPED
Start: 2021-06-30 | End: 2021-09-24 | Stop reason: SDUPTHER

## 2021-08-04 RX ORDER — URSODIOL 300 MG/1
300 CAPSULE ORAL 2 TIMES DAILY
Qty: 180 CAPSULE | Refills: 1 | Status: SHIPPED
Start: 2021-08-04 | End: 2022-02-07 | Stop reason: SDUPTHER

## 2021-08-04 NOTE — TELEPHONE ENCOUNTER
Last Appointment:  5/24/2021  Future Appointments   Date Time Provider Keyshawn Simpson   9/24/2021 10:30 AM Jhoana Pedro  [address] [facility] [address]

## 2021-09-24 ENCOUNTER — OFFICE VISIT (OUTPATIENT)
Dept: FAMILY MEDICINE CLINIC | Age: 81
End: 2021-09-24
Payer: MEDICARE

## 2021-09-24 VITALS
HEART RATE: 58 BPM | OXYGEN SATURATION: 95 % | WEIGHT: 169.6 LBS | SYSTOLIC BLOOD PRESSURE: 146 MMHG | TEMPERATURE: 97.3 F | DIASTOLIC BLOOD PRESSURE: 82 MMHG | BODY MASS INDEX: 22.38 KG/M2

## 2021-09-24 DIAGNOSIS — I48.19 PERSISTENT ATRIAL FIBRILLATION (HCC): ICD-10-CM

## 2021-09-24 DIAGNOSIS — M25.552 HIP PAIN, CHRONIC, LEFT: ICD-10-CM

## 2021-09-24 DIAGNOSIS — I50.32 CHRONIC DIASTOLIC HEART FAILURE (HCC): Chronic | ICD-10-CM

## 2021-09-24 DIAGNOSIS — Z23 IMMUNIZATION DUE: Primary | ICD-10-CM

## 2021-09-24 DIAGNOSIS — G89.29 HIP PAIN, CHRONIC, LEFT: ICD-10-CM

## 2021-09-24 DIAGNOSIS — I10 ESSENTIAL HYPERTENSION: ICD-10-CM

## 2021-09-24 PROCEDURE — 99214 OFFICE O/P EST MOD 30 MIN: CPT | Performed by: FAMILY MEDICINE

## 2021-09-24 RX ORDER — AMLODIPINE BESYLATE AND BENAZEPRIL HYDROCHLORIDE 10; 20 MG/1; MG/1
1 CAPSULE ORAL DAILY
Qty: 90 CAPSULE | Refills: 1 | Status: SHIPPED
Start: 2021-09-24 | End: 2022-03-29 | Stop reason: SDUPTHER

## 2021-09-24 RX ORDER — TAMSULOSIN HYDROCHLORIDE 0.4 MG/1
0.4 CAPSULE ORAL DAILY
Qty: 30 CAPSULE | Refills: 5 | Status: SHIPPED
Start: 2021-09-24 | End: 2022-03-07 | Stop reason: SDUPTHER

## 2021-09-24 RX ORDER — FUROSEMIDE 20 MG/1
TABLET ORAL
Qty: 90 TABLET | Refills: 1 | Status: SHIPPED
Start: 2021-09-24 | End: 2022-03-29 | Stop reason: SDUPTHER

## 2021-09-24 ASSESSMENT — ENCOUNTER SYMPTOMS
CONSTIPATION: 0
SORE THROAT: 0
SHORTNESS OF BREATH: 0
EYE REDNESS: 0
PHOTOPHOBIA: 0
COUGH: 0
DIARRHEA: 0
BLOOD IN STOOL: 0
BACK PAIN: 0
SINUS PAIN: 0
ABDOMINAL PAIN: 0
WHEEZING: 0
VOMITING: 0
TROUBLE SWALLOWING: 0

## 2021-09-24 NOTE — PROGRESS NOTES
OFFICE NOTE    9/24/21  Name: Ronaldo Palumbo  WGU:[id number]   Sex:male   Age:81 y.o. SUBJECTIVE  Chief Complaint   Patient presents with    Hypertension    Atrial Fibrillation       HPI Comes in for checkup and refills. Says he is a little more active than he was and able to work in shop in his garage    Review of Systems   Constitutional: Negative for appetite change, fever and unexpected weight change. Weight and appetite seem a little better   HENT: Negative for congestion, ear pain, hearing loss, sinus pain, sore throat and trouble swallowing. Eyes: Negative for photophobia, redness and visual disturbance. Respiratory: Negative for cough, shortness of breath and wheezing. Cardiovascular: Negative for chest pain, palpitations and leg swelling. Gastrointestinal: Negative for abdominal pain, blood in stool, constipation, diarrhea and vomiting. Endocrine: Negative for cold intolerance, polydipsia and polyuria. Genitourinary: Negative for difficulty urinating, genital sores, hematuria and urgency. Musculoskeletal: Positive for arthralgias. Negative for back pain and joint swelling. Continues to c/o left hip pain which really seems to curtail some of this activities   Allergic/Immunologic: Negative for food allergies. Neurological: Negative for dizziness, tremors, seizures, syncope, numbness and headaches. Hematological: Negative for adenopathy. Does not bruise/bleed easily. Psychiatric/Behavioral: Negative for agitation, dysphoric mood, hallucinations and sleep disturbance. The patient is nervous/anxious. All other systems reviewed and are negative.            Current Outpatient Medications:     furosemide (LASIX) 20 MG tablet, TAKE 1 TABLET BY MOUTH ONCE DAILY WITH EVENING MEAL, Disp: 90 tablet, Rfl: 1    apixaban (ELIQUIS) 5 MG TABS tablet, Take 1 tablet by mouth 2 times daily, Disp: 60 tablet, Rfl: 5    amLODIPine-benazepril (LOTREL) 10-20 MG per capsule, Take 1 Palpations: Abdomen is soft. There is no mass. Tenderness: There is no abdominal tenderness. There is no guarding. Hernia: No hernia is present. Musculoskeletal:         General: No swelling or tenderness. Normal range of motion. Cervical back: Neck supple. Right lower leg: No edema. Left lower leg: No edema. Lymphadenopathy:      Cervical: No cervical adenopathy. Skin:     General: Skin is warm and dry. Coloration: Skin is not jaundiced. Findings: No bruising or rash. Neurological:      General: No focal deficit present. Mental Status: He is alert and oriented to person, place, and time. Sensory: Sensory deficit present. Motor: Weakness present. No abnormal muscle tone. Coordination: Coordination normal.      Gait: Gait abnormal.   Psychiatric:         Mood and Affect: Mood normal.         Behavior: Behavior normal.           Sam was seen today for hypertension and atrial fibrillation. Diagnoses and all orders for this visit:    Immunization due  -     INFLUENZA, QUADV, ADJUVANTED, 65 YRS =, IM, PF, PREFILL SYR, 0.5ML (FLUAD)    Chronic diastolic heart failure (HCC)  -     furosemide (LASIX) 20 MG tablet; TAKE 1 TABLET BY MOUTH ONCE DAILY WITH EVENING MEAL    Persistent atrial fibrillation (HCC)  -     apixaban (ELIQUIS) 5 MG TABS tablet; Take 1 tablet by mouth 2 times daily    Essential hypertension  -     amLODIPine-benazepril (LOTREL) 10-20 MG per capsule; Take 1 capsule by mouth daily    Hip pain, chronic, left  -     External Referral To Orthopedic Surgery  He is not seeking legal redress, just wants to know if hip pain can be treated and what is causing it. Believe he had a partial replacement and large femoral jeremy. Other orders  -     tamsulosin (FLOMAX) 0.4 MG capsule; Take 1 capsule by mouth daily    Gets up to void at night sometimes can't get back to sleep.  Will try alpha blocker      Return in about 6 months (around 3/24/2022).     Electronically signed by Ilene Moreno MD on 9/24/21 at 11:32 AM EDT

## 2021-09-27 PROCEDURE — G0008 ADMIN INFLUENZA VIRUS VAC: HCPCS | Performed by: FAMILY MEDICINE

## 2021-09-27 PROCEDURE — 90694 VACC AIIV4 NO PRSRV 0.5ML IM: CPT | Performed by: FAMILY MEDICINE

## 2021-12-23 NOTE — CARE COORDINATION
Patient/family has been provided the following resources and education related to COVID-19:                         Signs, symptoms and red flags related to COVID-19            CDC exposure and quarantine guidelines            Conduit exposure contact - [phone]-provided on previous call            Contact for their local Department of Health-provided on previous call                 Patient's wife June(on communication release of information form)reports her  continues to have no symptoms as related to COVID-19. Patient having soreness as related to his hip fracture and working with PT. Patient will be seeing PCP this Friday(5/8/20)per June. No further outreach scheduled with this CTN/ACM. Patient has this CTN/ACM contact information if future needs arise. Respiratory lab collected in clinic today per provider request. Patient's name and date of birth verified. Patient tolerated well.       Bárbara Krueger LPN December 23, 2021 7:44 AM

## 2022-01-07 ENCOUNTER — OFFICE VISIT (OUTPATIENT)
Dept: FAMILY MEDICINE CLINIC | Age: 82
End: 2022-01-07
Payer: MEDICARE

## 2022-01-07 VITALS
BODY MASS INDEX: 22.53 KG/M2 | DIASTOLIC BLOOD PRESSURE: 72 MMHG | TEMPERATURE: 97.9 F | HEART RATE: 72 BPM | OXYGEN SATURATION: 94 % | WEIGHT: 170 LBS | HEIGHT: 73 IN | SYSTOLIC BLOOD PRESSURE: 122 MMHG

## 2022-01-07 DIAGNOSIS — R05.9 COUGH: ICD-10-CM

## 2022-01-07 DIAGNOSIS — J02.9 SORE THROAT: ICD-10-CM

## 2022-01-07 DIAGNOSIS — U07.1 COVID: ICD-10-CM

## 2022-01-07 DIAGNOSIS — J34.89 SINUS DRAINAGE: ICD-10-CM

## 2022-01-07 LAB
Lab: ABNORMAL
PERFORMING INSTRUMENT: ABNORMAL
QC PASS/FAIL: ABNORMAL
SARS-COV-2, POC: DETECTED

## 2022-01-07 PROCEDURE — 99213 OFFICE O/P EST LOW 20 MIN: CPT | Performed by: INTERNAL MEDICINE

## 2022-01-07 PROCEDURE — 87426 SARSCOV CORONAVIRUS AG IA: CPT | Performed by: INTERNAL MEDICINE

## 2022-01-08 ASSESSMENT — ENCOUNTER SYMPTOMS
COUGH: 1
WHEEZING: 0
EYES NEGATIVE: 1
DIARRHEA: 0
VOMITING: 0
SORE THROAT: 1
SINUS PRESSURE: 1
NAUSEA: 0
SINUS PAIN: 0
SHORTNESS OF BREATH: 0
ABDOMINAL PAIN: 0
CHEST TIGHTNESS: 0

## 2022-01-08 NOTE — PROGRESS NOTES
[address] [place]     22  Benjy Shane : 1940 Sex: male  Age: 80 y.o. Chief Complaint   Patient presents with    Pharyngitis    Sinus Problem    Cough     chronic but worsened yesterday       HPI    Patient presents to express care today complaining of sore, sinus congestion and drainage as well as cough which she states is chronic but worsening in the last day or so. Also complains of sore throat. Denies loss of taste or smell. Denies fever or chills. No headache or myalgia. Denies shortness of breath. No recent exposures. Has been Vaccinated but not boosted        Review of Systems   Constitutional: Negative for chills and fever. HENT: Positive for postnasal drip, sinus pressure and sore throat. Negative for congestion, ear pain and sinus pain. Eyes: Negative. Respiratory: Positive for cough. Negative for chest tightness, shortness of breath and wheezing. Cardiovascular: Negative for chest pain. Gastrointestinal: Negative for abdominal pain, diarrhea, nausea and vomiting. Musculoskeletal: Negative for myalgias. Neurological: Negative for headaches. REST OF PERTINENT ROS GONE OVER AND WAS NEGATIVE. Current Outpatient Medications:     furosemide (LASIX) 20 MG tablet, TAKE 1 TABLET BY MOUTH ONCE DAILY WITH EVENING MEAL, Disp: 90 tablet, Rfl: 1    apixaban (ELIQUIS) 5 MG TABS tablet, Take 1 tablet by mouth 2 times daily, Disp: 60 tablet, Rfl: 5    amLODIPine-benazepril (LOTREL) 10-20 MG per capsule, Take 1 capsule by mouth daily, Disp: 90 capsule, Rfl: 1    tamsulosin (FLOMAX) 0.4 MG capsule, Take 1 capsule by mouth daily, Disp: 30 capsule, Rfl: 5    ursodiol (ACTIGALL) 300 MG capsule, Take 1 capsule by mouth 2 times daily, Disp: 180 capsule, Rfl: 1    Handicap Placard MISC, by Does not apply route 5 years.  OA as diagnosis, Disp: 2 each, Rfl: 0    leuprolide acetate, 6 Month, (ELIGARD) 45 MG injection, Inject 45 mg into the skin once One injection every 6 months, Disp: , Rfl:     Multiple Vitamins-Minerals (THERAPEUTIC MULTIVITAMIN-MINERALS) tablet, Take 1 tablet by mouth daily, Disp: , Rfl:   Allergies   Allergen Reactions    Flecainide Dermatitis    Zoloft [Sertraline]        Past Medical History:   Diagnosis Date    Arthritis     Knees    Atrial fibrillation (HCC)     Cancer ([facility] [address]     skin cancer; Prostate cancer     CHF (congestive heart failure) (HCC)     Gallstone pancreatitis     H/O esophagogastroduodenoscopy 02/2013    dr Jackson Mathis no Hpylori, or Howell's     H/O ETOH abuse     sober for several years    Hypertension     MDRO (multiple drug resistant organisms) resistance     Pacemaker     REMOVED 2018 due to infection     Past Surgical History:   Procedure Laterality Date    ABDOMEN SURGERY      CATARACT REMOVAL Bilateral     CHOLECYSTECTOMY      COLONOSCOPY  06/11/2016    additional dates: 2003,02/28/2013    ENDOSCOPY, COLON, DIAGNOSTIC      ERCP      and ston removal 03/2007    EYE SURGERY Bilateral     cataracts    HIP SURGERY Left 4/24/2020    HIP HEMIARTHROPLASTY -- HILARY performed by Yeny Trimble DO at [address] OTHER SURGICAL HISTORY      cardioversion    PACEMAKER PLACEMENT      LASER LEAD EXTRACTION  03/02/2018    SKIN BIOPSY      TONSILLECTOMY       Family History   Problem Relation Age of Onset    Hypertension Father     Heart Disease Father     Emphysema Father     Coronary Art Dis Mother     Osteoporosis Mother      Social History     Socioeconomic History    Marital status:      Spouse name: June    Number of children: 1    Years of education: 12    Highest education level: Not on file   Occupational History    Not on file   Tobacco Use    Smoking status: Never Smoker    Smokeless tobacco: Never Used   Vaping Use    Vaping Use: Never used   Substance and Sexual Activity    Alcohol use: No    Drug use: No    Sexual activity: Not Currently     Partners: Female Other Topics Concern    Not on file   Social History Narrative    Not on file     Social Determinants of Health     Financial Resource Strain: Unknown    Difficulty of Paying Living Expenses: Patient refused   Food Insecurity: Unknown    Worried About Running Out of Food in the Last Year: Patient refused    [address] in the Last Year: Patient refused   Transportation Needs:     Lack of Transportation (Medical): Not on file    Lack of Transportation (Non-Medical): Not on file   Physical Activity:     Days of Exercise per Week: Not on file    Minutes of Exercise per Session: Not on file   Stress:     Feeling of Stress : Not on file   Social Connections:     Frequency of Communication with Friends and Family: Not on file    Frequency of Social Gatherings with Friends and Family: Not on file    Attends Hindu Services: Not on file    Active Member of [address] [place] [address] or Organizations: Not on file    Attends Club or Organization Meetings: Not on file    Marital Status: Not on file   Intimate Partner Violence:     Fear of Current or Ex-Partner: Not on file    Emotionally Abused: Not on file    Physically Abused: Not on file    Sexually Abused: Not on file   Housing Stability:     Unable to Pay for Housing in the Last Year: Not on file    Number of [place] in the Last Year: Not on file    Unstable Housing in the Last Year: Not on file       Vitals:    01/07/22 0829   BP: 122/72   Pulse: 72   Temp: 97.9 °F (36.6 °C)   SpO2: 94%   Weight: 170 lb (77.1 kg)   Height: 6' 1\" (1.854 m)       Physical Exam  Vitals and nursing note reviewed. Constitutional:       General: He is not in acute distress. Appearance: He is well-developed. HENT:      Head: Normocephalic and atraumatic.       Right Ear: Tympanic membrane, ear canal and external ear normal.      Left Ear: Tympanic membrane, ear canal and external ear normal.      Nose: Nose normal.      Mouth/Throat:      Mouth: Mucous membranes are

## 2022-01-12 ENCOUNTER — OFFICE VISIT (OUTPATIENT)
Dept: FAMILY MEDICINE CLINIC | Age: 82
End: 2022-01-12
Payer: MEDICARE

## 2022-01-12 VITALS
HEART RATE: 80 BPM | DIASTOLIC BLOOD PRESSURE: 68 MMHG | SYSTOLIC BLOOD PRESSURE: 116 MMHG | OXYGEN SATURATION: 96 % | TEMPERATURE: 96.8 F

## 2022-01-12 DIAGNOSIS — U07.1 COVID-19: Primary | ICD-10-CM

## 2022-01-12 PROCEDURE — 96372 THER/PROPH/DIAG INJ SC/IM: CPT | Performed by: FAMILY MEDICINE

## 2022-01-12 PROCEDURE — 99213 OFFICE O/P EST LOW 20 MIN: CPT | Performed by: FAMILY MEDICINE

## 2022-01-12 RX ORDER — AZITHROMYCIN 250 MG/1
250 TABLET, FILM COATED ORAL SEE ADMIN INSTRUCTIONS
Qty: 6 TABLET | Refills: 0 | Status: SHIPPED | OUTPATIENT
Start: 2022-01-12 | End: 2022-01-17

## 2022-01-12 RX ORDER — METHYLPREDNISOLONE ACETATE 40 MG/ML
40 INJECTION, SUSPENSION INTRA-ARTICULAR; INTRALESIONAL; INTRAMUSCULAR; SOFT TISSUE ONCE
Status: COMPLETED | OUTPATIENT
Start: 2022-01-12 | End: 2022-01-12

## 2022-01-12 RX ORDER — GUAIFENESIN 600 MG/1
600 TABLET, EXTENDED RELEASE ORAL 2 TIMES DAILY
Qty: 30 TABLET | Refills: 0 | Status: SHIPPED | OUTPATIENT
Start: 2022-01-12 | End: 2022-01-27

## 2022-01-12 RX ADMIN — METHYLPREDNISOLONE ACETATE 40 MG: 40 INJECTION, SUSPENSION INTRA-ARTICULAR; INTRALESIONAL; INTRAMUSCULAR; SOFT TISSUE at 12:34

## 2022-01-12 ASSESSMENT — ENCOUNTER SYMPTOMS
BLOOD IN STOOL: 0
CONSTIPATION: 0
PHOTOPHOBIA: 0
TROUBLE SWALLOWING: 0
SINUS PAIN: 0
COUGH: 1
VOMITING: 0
WHEEZING: 0
SORE THROAT: 1
SHORTNESS OF BREATH: 1
ABDOMINAL PAIN: 0
BACK PAIN: 0
EYE REDNESS: 0
DIARRHEA: 0

## 2022-01-12 NOTE — PROGRESS NOTES
OFFICE NOTE    1/12/22  Name: Camila Salvador  ZPO:5[birth year]60[birth year]   Sex:male   Age:81 y.o. SUBJECTIVE  Chief Complaint   Patient presents with   Roxy Jonaher, never heard from infusion center       HPI Matti Prasad is recovering from Covid. Had 2 Moderna vaccines. Feels weak, nauseous, cough and painful breathing. No fever, no real taste issues but not hungry. Orders were sent in for monoclonal antibody infusion by Dr. Dia Castellanos. Never called from [facility], now protocol has changed    Review of Systems   Constitutional: Positive for fatigue. Negative for appetite change, fever and unexpected weight change. HENT: Positive for congestion, hearing loss, postnasal drip and sore throat. Negative for ear pain, sinus pain and trouble swallowing. Eyes: Negative for photophobia, redness and visual disturbance. Respiratory: Positive for cough and shortness of breath. Negative for wheezing. Cardiovascular: Negative for chest pain, palpitations and leg swelling. Gastrointestinal: Negative for abdominal pain, blood in stool, constipation, diarrhea and vomiting. Endocrine: Negative for cold intolerance, polydipsia and polyuria. Genitourinary: Negative for difficulty urinating, genital sores, hematuria and urgency. Musculoskeletal: Negative for arthralgias, back pain and joint swelling. Skin: Negative for pallor and rash. Allergic/Immunologic: Negative for food allergies. Neurological: Negative for dizziness, tremors, seizures, syncope, numbness and headaches. Hematological: Negative for adenopathy. Does not bruise/bleed easily. Psychiatric/Behavioral: Negative for agitation, dysphoric mood, hallucinations and sleep disturbance. The patient is not nervous/anxious. All other systems reviewed and are negative.            Current Outpatient Medications:     azithromycin (ZITHROMAX) 250 MG tablet, Take 1 tablet by mouth See Admin Instructions for 5 days 500mg on day 1 followed by 250mg on days 2 - 5, Disp: 6 tablet, Rfl: 0    guaiFENesin (MUCINEX) 600 MG extended release tablet, Take 1 tablet by mouth 2 times daily for 15 days, Disp: 30 tablet, Rfl: 0    furosemide (LASIX) 20 MG tablet, TAKE 1 TABLET BY MOUTH ONCE DAILY WITH EVENING MEAL, Disp: 90 tablet, Rfl: 1    apixaban (ELIQUIS) 5 MG TABS tablet, Take 1 tablet by mouth 2 times daily, Disp: 60 tablet, Rfl: 5    amLODIPine-benazepril (LOTREL) 10-20 MG per capsule, Take 1 capsule by mouth daily, Disp: 90 capsule, Rfl: 1    tamsulosin (FLOMAX) 0.4 MG capsule, Take 1 capsule by mouth daily, Disp: 30 capsule, Rfl: 5    ursodiol (ACTIGALL) 300 MG capsule, Take 1 capsule by mouth 2 times daily, Disp: 180 capsule, Rfl: 1    Handicap Placard MISC, by Does not apply route 5 years.  OA as diagnosis, Disp: 2 each, Rfl: 0    leuprolide acetate, 6 Month, (ELIGARD) 45 MG injection, Inject 45 mg into the skin once One injection every 6 months, Disp: , Rfl:     Multiple Vitamins-Minerals (THERAPEUTIC MULTIVITAMIN-MINERALS) tablet, Take 1 tablet by mouth daily, Disp: , Rfl:   Allergies   Allergen Reactions    Flecainide Dermatitis    Zoloft [Sertraline]        Past Medical History:   Diagnosis Date    Arthritis     Knees    Atrial fibrillation (HCC)     Cancer ([facility] [address]     skin cancer; Prostate cancer     [facility]estive heart failure) ([facility] [address]     Gallstone pancreatitis     H/O esophagogastroduodenoscopy 02/2013    dr Aliyah Basilio no Hpylori, or Howell's     H/O ETOH abuse     sober for several years    Hypertension     MDRO (multiple drug resistant organisms) resistance     Pacemaker     REMOVED 2018 due to infection     Past Surgical History:   Procedure Laterality Date    ABDOMEN SURGERY      CATARACT REMOVAL Bilateral     CHOLECYSTECTOMY      COLONOSCOPY  06/11/2016    additional dates: 2003,02/28/2013    ENDOSCOPY, COLON, DIAGNOSTIC      ERCP      and ston removal 03/2007    EYE SURGERY Bilateral     cataracts    HIP SURGERY Left 4/24/2020    HIP HEMIARTHROPLASTY -- HILARY performed by Laurence Melchor DO at [facility] OTHER SURGICAL HISTORY      cardioversion    PACEMAKER PLACEMENT      LASER LEAD EXTRACTION  03/02/2018    SKIN BIOPSY      TONSILLECTOMY       Family History   Problem Relation Age of Onset    Hypertension Father     Heart Disease Father     Emphysema Father     Coronary Art Dis Mother     Osteoporosis Mother      Social History     Tobacco History     Smoking Status  Never Smoker    Smokeless Tobacco Use  Never Used          Alcohol History     Alcohol Use Status  No          Drug Use     Drug Use Status  No          Sexual Activity     Sexually Active  Not Currently Partners  Female                OBJECTIVE  Vitals:    01/12/22 1053   BP: 116/68   Site: Left Upper Arm   Position: Sitting   Pulse: 80   Temp: 96.8 °F (36 °C)   TempSrc: Temporal   SpO2: 96%        There is no height or weight on file to calculate BMI. No orders of the defined types were placed in this encounter. EXAM   Physical Exam  Vitals reviewed. Constitutional:       Appearance: Normal appearance. He is normal weight. HENT:      Right Ear: Tympanic membrane normal.      Left Ear: Tympanic membrane normal.      Nose: Congestion present. Mouth/Throat:      Pharynx: Oropharynx is clear. Posterior oropharyngeal erythema present. Eyes:      Conjunctiva/sclera: Conjunctivae normal.      Pupils: Pupils are equal, round, and reactive to light. Cardiovascular:      Rate and Rhythm: Normal rate. Rhythm irregular. Heart sounds: Murmur heard. Pulmonary:      Effort: Pulmonary effort is normal.      Breath sounds: No wheezing or rhonchi. Abdominal:      General: Bowel sounds are normal.      Tenderness: There is no abdominal tenderness. Musculoskeletal:         General: No swelling or tenderness. Right lower leg: No edema. Left lower leg: No edema. Lymphadenopathy:      Cervical: No cervical adenopathy.    Skin: Coloration: Skin is not jaundiced. Findings: No bruising or rash. Neurological:      General: No focal deficit present. Mental Status: He is alert and oriented to person, place, and time. Sensory: No sensory deficit. Motor: Weakness present. Gait: Gait abnormal.   Psychiatric:         Mood and Affect: Mood normal.         Behavior: Behavior normal.           Sam was seen today for other. Diagnoses and all orders for this visit:    COVID-19  -     azithromycin (ZITHROMAX) 250 MG tablet; Take 1 tablet by mouth See Admin Instructions for 5 days 500mg on day 1 followed by 250mg on days 2 - 5  -     methylPREDNISolone acetate (DEPO-MEDROL) injection 40 mg  -     guaiFENesin (MUCINEX) 600 MG extended release tablet; Take 1 tablet by mouth 2 times daily for 15 days    Alert, oxygenating well, weak. Will not be able to get antibodies. See above. Vitamin D and zinc will be given. Currently anticoagulated      No follow-ups on file.     Electronically signed by Brandee Renae MD on 1/12/22 at 11:55 AM EST

## 2022-02-07 RX ORDER — URSODIOL 300 MG/1
300 CAPSULE ORAL 2 TIMES DAILY
Qty: 180 CAPSULE | Refills: 1 | Status: SHIPPED
Start: 2022-02-07 | End: 2022-08-11 | Stop reason: SDUPTHER

## 2022-02-07 NOTE — TELEPHONE ENCOUNTER
Last Appointment:  1/12/2022  Future Appointments   Date Time Provider Keyshawn Simpson   3/29/2022  8:00 AM Verner Notice,  [address] [place] [address]

## 2022-02-28 ENCOUNTER — OFFICE VISIT (OUTPATIENT)
Dept: FAMILY MEDICINE CLINIC | Age: 82
End: 2022-02-28
Payer: MEDICARE

## 2022-02-28 VITALS
HEART RATE: 71 BPM | OXYGEN SATURATION: 95 % | SYSTOLIC BLOOD PRESSURE: 116 MMHG | TEMPERATURE: 97.3 F | DIASTOLIC BLOOD PRESSURE: 74 MMHG

## 2022-02-28 DIAGNOSIS — I49.3 FREQUENT PVCS: ICD-10-CM

## 2022-02-28 DIAGNOSIS — I50.32 CHRONIC DIASTOLIC HEART FAILURE (HCC): Chronic | ICD-10-CM

## 2022-02-28 DIAGNOSIS — I50.32 CHRONIC DIASTOLIC HEART FAILURE (HCC): Primary | Chronic | ICD-10-CM

## 2022-02-28 DIAGNOSIS — I48.0 PAROXYSMAL ATRIAL FIBRILLATION (HCC): ICD-10-CM

## 2022-02-28 DIAGNOSIS — C61 PROSTATE CANCER (HCC): ICD-10-CM

## 2022-02-28 PROBLEM — I49.5 SINUS NODE DYSFUNCTION (HCC): Status: RESOLVED | Noted: 2018-02-27 | Resolved: 2022-02-28

## 2022-02-28 LAB
ANION GAP SERPL CALCULATED.3IONS-SCNC: 9 MMOL/L (ref 7–16)
BUN BLDV-MCNC: 12 MG/DL (ref 6–23)
CALCIUM SERPL-MCNC: 9.4 MG/DL (ref 8.6–10.2)
CHLORIDE BLD-SCNC: 101 MMOL/L (ref 98–107)
CO2: 29 MMOL/L (ref 22–29)
CREAT SERPL-MCNC: 0.9 MG/DL (ref 0.7–1.2)
GFR AFRICAN AMERICAN: >60
GFR NON-AFRICAN AMERICAN: >60 ML/MIN/1.73
GLUCOSE BLD-MCNC: 93 MG/DL (ref 74–99)
MAGNESIUM: 2.1 MG/DL (ref 1.6–2.6)
POTASSIUM SERPL-SCNC: 4.8 MMOL/L (ref 3.5–5)
SODIUM BLD-SCNC: 139 MMOL/L (ref 132–146)

## 2022-02-28 PROCEDURE — 93000 ELECTROCARDIOGRAM COMPLETE: CPT | Performed by: FAMILY MEDICINE

## 2022-02-28 PROCEDURE — 99214 OFFICE O/P EST MOD 30 MIN: CPT | Performed by: FAMILY MEDICINE

## 2022-02-28 ASSESSMENT — PATIENT HEALTH QUESTIONNAIRE - PHQ9
SUM OF ALL RESPONSES TO PHQ9 QUESTIONS 1 & 2: 0
SUM OF ALL RESPONSES TO PHQ QUESTIONS 1-9: 0
2. FEELING DOWN, DEPRESSED OR HOPELESS: 0
SUM OF ALL RESPONSES TO PHQ QUESTIONS 1-9: 0
SUM OF ALL RESPONSES TO PHQ QUESTIONS 1-9: 0
1. LITTLE INTEREST OR PLEASURE IN DOING THINGS: 0
SUM OF ALL RESPONSES TO PHQ QUESTIONS 1-9: 0

## 2022-02-28 NOTE — PROGRESS NOTES
OFFICE NOTE    2/28/22  Name: Jason Wise  [facility]:[birth year]   Sex:male   Age:81 y.o. SUBJECTIVE  Chief Complaint   Patient presents with    Palpitations     Eligard inj on 2/1    Shortness of Breath       HPI Every time I get that shot it knocks me down for a mos or so. Intermittently SOB an heart races. Also can't sleep at night due to breathing. Review of Systems   No fever. Mood a little low. Aware he is in and out of Afib. Normally bracycardic    Current Outpatient Medications:     ursodiol (ACTIGALL) 300 MG capsule, Take 1 capsule by mouth 2 times daily, Disp: 180 capsule, Rfl: 1    furosemide (LASIX) 20 MG tablet, TAKE 1 TABLET BY MOUTH ONCE DAILY WITH EVENING MEAL, Disp: 90 tablet, Rfl: 1    apixaban (ELIQUIS) 5 MG TABS tablet, Take 1 tablet by mouth 2 times daily, Disp: 60 tablet, Rfl: 5    amLODIPine-benazepril (LOTREL) 10-20 MG per capsule, Take 1 capsule by mouth daily, Disp: 90 capsule, Rfl: 1    tamsulosin (FLOMAX) 0.4 MG capsule, Take 1 capsule by mouth daily, Disp: 30 capsule, Rfl: 5    Handicap Placard MISC, by Does not apply route 5 years.  OA as diagnosis, Disp: 2 each, Rfl: 0    leuprolide acetate, 6 Month, (ELIGARD) 45 MG injection, Inject 45 mg into the skin once One injection every 6 months, Disp: , Rfl:     Multiple Vitamins-Minerals (THERAPEUTIC MULTIVITAMIN-MINERALS) tablet, Take 1 tablet by mouth daily, Disp: , Rfl:   Allergies   Allergen Reactions    Flecainide Dermatitis    Zoloft [Sertraline]        Past Medical History:   Diagnosis Date    Arthritis     Knees    Atrial fibrillation (HCC)     Cancer ([address]     skin cancer; Prostate cancer     [facility]) [facility] [address]     Gallstone pancreatitis     H/O esophagogastroduodenoscopy 02/2013    dr Karen Avila no Hpylori, or Howell's     H/O ETOH abuse     sober for several years    Hypertension     MDRO (multiple drug resistant organisms) resistance     Pacemaker     REMOVED 2018 due to infection     Past Surgical History:   Procedure Laterality Date    ABDOMEN SURGERY      CATARACT REMOVAL Bilateral     CHOLECYSTECTOMY      COLONOSCOPY  06/11/2016    additional dates: 2003,02/28/2013    ENDOSCOPY, COLON, DIAGNOSTIC      ERCP      and ston removal 03/2007    EYE SURGERY Bilateral     cataracts    HIP SURGERY Left 4/24/2020    HIP HEMIARTHROPLASTY -- HILARY performed by Akanksha Griffith DO at [facility] OTHER SURGICAL HISTORY      cardioversion    PACEMAKER PLACEMENT      LASER LEAD EXTRACTION  03/02/2018    SKIN BIOPSY      TONSILLECTOMY       Family History   Problem Relation Age of Onset    Hypertension Father     Heart Disease Father     Emphysema Father     Coronary Art Dis Mother     Osteoporosis Mother      Social History     Tobacco History     Smoking Status  Never Smoker    Smokeless Tobacco Use  Never Used          Alcohol History     Alcohol Use Status  No          Drug Use     Drug Use Status  No          Sexual Activity     Sexually Active  Not Currently Partners  Female                OBJECTIVE  Vitals:    02/28/22 0828   BP: 116/74   Pulse: 71   Temp: 97.3 °F (36.3 °C)   TempSrc: Temporal   SpO2: 95%        There is no height or weight on file to calculate BMI. Orders Placed This Encounter   Procedures    Basic Metabolic Panel     Standing Status:   Future     Number of Occurrences:   1     Standing Expiration Date:   2/28/2023    Magnesium     Standing Status:   Future     Number of Occurrences:   1     Standing Expiration Date:   2/28/2023    EKG 12 Lead     Standing Status:   Future     Number of Occurrences:   1     Standing Expiration Date:   2/28/2023     Order Specific Question:   Reason for Exam?     Answer:   Irregular heart rate        EXAM   Physical Exam  Vitals and nursing note reviewed. Constitutional:       Appearance: Normal appearance. Comments: Poor eye contact. Not talking much   Neck:      Vascular: No carotid bruit.    Cardiovascular:      Rate and Rhythm: Normal rate. Rhythm irregular. Heart sounds: Murmur heard. Comments: 2-3 second pause irregular  Pulmonary:      Effort: Pulmonary effort is normal.      Breath sounds: Normal breath sounds. No wheezing, rhonchi or rales. Abdominal:      General: Bowel sounds are normal.      Tenderness: There is no abdominal tenderness. Lymphadenopathy:      Cervical: No cervical adenopathy. Skin:     Coloration: Skin is not jaundiced or pale. Findings: No bruising or rash. Neurological:      General: No focal deficit present. Mental Status: He is alert and oriented to person, place, and time. Psychiatric:         Behavior: Behavior normal.      Comments: Seems a little depressed. June doesn't think so will watch           Amador Boudreaux was seen today for palpitations and shortness of breath. Diagnoses and all orders for this visit:    Chronic diastolic heart failure (HCC)  -     EKG 12 Lead; Future  -     EKG 12 Lead  -     Basic Metabolic Panel; Future  -     Magnesium; Future  Last ECHO I saw 2018, Saw Dinahshannan Silvia earlier this year. Will put in call and discuss, see below  Prostate cancer ([facility] [address]  On Lupron every 6 mos. Feels it precipitates some of his problems  Paroxysmal atrial fibrillation (HCC)  -     EKG 12 Lead; Future  -     EKG 12 Lead  EKG looks ugly. 3 second pause, Afib with controlled rate. Frequent multifocal PVCs , NSSTW changes  Frequent PVCs  -     Basic Metabolic Panel; Future  -     Magnesium; Future  Will discuss with Dr. Yoly Lamb. Am concerned might be having short runs PVCs        No follow-ups on file.     Electronically signed by Arnie Gonzalez MD on 2/28/22 at 8:34 AM EST

## 2022-03-07 ENCOUNTER — TELEPHONE (OUTPATIENT)
Dept: FAMILY MEDICINE CLINIC | Age: 82
End: 2022-03-07

## 2022-03-07 DIAGNOSIS — N40.1 BENIGN PROSTATIC HYPERPLASIA WITH URINARY FREQUENCY: Primary | ICD-10-CM

## 2022-03-07 DIAGNOSIS — R35.0 BENIGN PROSTATIC HYPERPLASIA WITH URINARY FREQUENCY: Primary | ICD-10-CM

## 2022-03-07 RX ORDER — TAMSULOSIN HYDROCHLORIDE 0.4 MG/1
0.4 CAPSULE ORAL DAILY
Qty: 30 CAPSULE | Refills: 5 | Status: SHIPPED
Start: 2022-03-07 | End: 2022-08-25 | Stop reason: SDUPTHER

## 2022-03-07 NOTE — TELEPHONE ENCOUNTER
Detail Level: None I remember doing it but it must not have sent it. Sent again. Make sure takes it with food.  Typically evening meal Urine Pregnancy Test Result: negative

## 2022-03-07 NOTE — TELEPHONE ENCOUNTER
June - Wife  [phone]   She is calling about the medication that you discussed with her on Friday. She said that you was sending something to [facility] for his urinating issues. She said the pharmacy has not received anything yet.

## 2022-03-29 ENCOUNTER — OFFICE VISIT (OUTPATIENT)
Dept: FAMILY MEDICINE CLINIC | Age: 82
End: 2022-03-29
Payer: MEDICARE

## 2022-03-29 VITALS
OXYGEN SATURATION: 92 % | WEIGHT: 173 LBS | TEMPERATURE: 97 F | HEART RATE: 72 BPM | RESPIRATION RATE: 17 BRPM | BODY MASS INDEX: 22.93 KG/M2 | DIASTOLIC BLOOD PRESSURE: 74 MMHG | HEIGHT: 73 IN | SYSTOLIC BLOOD PRESSURE: 128 MMHG

## 2022-03-29 DIAGNOSIS — I10 ESSENTIAL HYPERTENSION: ICD-10-CM

## 2022-03-29 DIAGNOSIS — I50.32 CHRONIC DIASTOLIC HEART FAILURE (HCC): Chronic | ICD-10-CM

## 2022-03-29 DIAGNOSIS — I48.19 PERSISTENT ATRIAL FIBRILLATION (HCC): ICD-10-CM

## 2022-03-29 PROCEDURE — 99213 OFFICE O/P EST LOW 20 MIN: CPT | Performed by: FAMILY MEDICINE

## 2022-03-29 RX ORDER — FUROSEMIDE 20 MG/1
TABLET ORAL
Qty: 180 TABLET | Refills: 1 | Status: SHIPPED
Start: 2022-03-29 | End: 2022-08-11

## 2022-03-29 RX ORDER — AMLODIPINE BESYLATE AND BENAZEPRIL HYDROCHLORIDE 10; 20 MG/1; MG/1
1 CAPSULE ORAL DAILY
Qty: 90 CAPSULE | Refills: 1 | Status: SHIPPED
Start: 2022-03-29 | End: 2022-08-11

## 2022-03-29 ASSESSMENT — ENCOUNTER SYMPTOMS
CONSTIPATION: 0
COUGH: 0
VOMITING: 0
PHOTOPHOBIA: 0
TROUBLE SWALLOWING: 0
SINUS PAIN: 0
ABDOMINAL PAIN: 0
WHEEZING: 0
BLOOD IN STOOL: 0
EYE REDNESS: 0
BACK PAIN: 0
DIARRHEA: 0
SHORTNESS OF BREATH: 1
SORE THROAT: 0

## 2022-03-29 NOTE — PROGRESS NOTES
OFFICE NOTE    3/29/22  Name: Jazz Hathaway  ONM:[birth year]   Sex:male   Age:81 y.o. SUBJECTIVE  Chief Complaint   Patient presents with    Medication Refill    Shortness of Breath       HPI seems a little better. Time since androgen deprivation shot has increased. Decided will take no more of these. Advised I suspected he would die of something else before prostate cancer could advance enough to threaten him. Having formal PFTs done    Review of Systems   Constitutional: Positive for fatigue. Negative for appetite change, fever and unexpected weight change. HENT: Negative for congestion, ear pain, hearing loss, sinus pain, sore throat and trouble swallowing. Eyes: Negative for photophobia, redness and visual disturbance. Respiratory: Positive for shortness of breath. Negative for cough and wheezing. Cardiovascular: Positive for palpitations. Negative for chest pain and leg swelling. Gastrointestinal: Negative for abdominal pain, blood in stool, constipation, diarrhea and vomiting. Endocrine: Negative for cold intolerance, polydipsia and polyuria. Genitourinary: Negative for difficulty urinating, genital sores, hematuria and urgency. Musculoskeletal: Positive for arthralgias. Negative for back pain and joint swelling. Skin: Negative for pallor and rash. Allergic/Immunologic: Negative for food allergies. Neurological: Positive for weakness, light-headedness and numbness. Negative for dizziness, tremors, seizures, syncope and headaches. Hematological: Negative for adenopathy. Does not bruise/bleed easily. Psychiatric/Behavioral: Negative for agitation, dysphoric mood, hallucinations and sleep disturbance. The patient is nervous/anxious. All other systems reviewed and are negative.            Current Outpatient Medications:     apixaban (ELIQUIS) 5 MG TABS tablet, Take 1 tablet by mouth 2 times daily, Disp: 60 tablet, Rfl: 5    furosemide (LASIX) 20 MG tablet, TAKE  2 tabs in AM daily, Disp: 180 tablet, Rfl: 1    amLODIPine-benazepril (LOTREL) 10-20 MG per capsule, Take 1 capsule by mouth daily, Disp: 90 capsule, Rfl: 1    tamsulosin (FLOMAX) 0.4 MG capsule, Take 1 capsule by mouth daily, Disp: 30 capsule, Rfl: 5    ursodiol (ACTIGALL) 300 MG capsule, Take 1 capsule by mouth 2 times daily, Disp: 180 capsule, Rfl: 1    Handicap Placard MISC, by Does not apply route 5 years.  OA as diagnosis, Disp: 2 each, Rfl: 0    leuprolide acetate, 6 Month, (ELIGARD) 45 MG injection, Inject 45 mg into the skin once One injection every 6 months, Disp: , Rfl:     Multiple Vitamins-Minerals (THERAPEUTIC MULTIVITAMIN-MINERALS) tablet, Take 1 tablet by mouth daily, Disp: , Rfl:   Allergies   Allergen Reactions    Flecainide Dermatitis    Zoloft [Sertraline]        Past Medical History:   Diagnosis Date    Arthritis     Knees    Atrial fibrillation (HCC)     Cancer ([facility]     skin cancer; Prostate cancer     CHF (congestive [facility] failure) ([facility] [facility]     Gallstone pancreatitis     H/O esophagogastroduodenoscopy 02/2013    dr [place] White no Hpylori, or Howell's     H/O ETOH abuse     sober for several years    Hypertension     MDRO (multiple drug resistant organisms) resistance     Pacemaker     REMOVED 2018 due to infection     Past Surgical History:   Procedure Laterality Date    ABDOMEN SURGERY      CATARACT REMOVAL Bilateral     CHOLECYSTECTOMY      COLONOSCOPY  06/11/2016    additional dates: 2003,02/28/2013    ENDOSCOPY, COLON, DIAGNOSTIC      ERCP      and ston removal 03/2007    EYE SURGERY Bilateral     cataracts    HIP SURGERY Left 4/24/2020    HIP HEMIARTHROPLASTY -- HILARY [facility]villeDO [facility] OTHER SURGICAL HISTORY      cardioversion    PACEMAKER PLACEMENT      LASER LEAD EXTRACTION  03/02/2018    SKIN BIOPSY      TONSILLECTOMY       Family History   Problem Relation Age of Onset    Hypertension Father     Heart Disease Father     Emphysema Father Musculoskeletal:         General: Swelling present. No tenderness. Normal range of motion. Cervical back: Neck supple. Right lower leg: No edema. Left lower leg: No edema. Comments: OA of both knees, moderate   Lymphadenopathy:      Cervical: No cervical adenopathy. Skin:     General: Skin is warm and dry. Coloration: Skin is not jaundiced. Findings: No bruising or rash. Neurological:      General: No focal deficit present. Mental Status: He is alert and oriented to person, place, and time. Sensory: No sensory deficit. Motor: No weakness or abnormal muscle tone. Coordination: Coordination normal.      Gait: Gait normal.   Psychiatric:         Mood and Affect: Mood normal.         Behavior: Behavior normal.           Sam was seen today for medication refill and shortness of breath. Diagnoses and all orders for this visit:    Persistent atrial fibrillation (HCC)  -     apixaban (ELIQUIS) 5 MG TABS tablet; Take 1 tablet by mouth 2 times daily  -     Basic Metabolic Panel; Future  Rate controlled. Will not change anything  Chronic diastolic heart failure (HCC)  -     furosemide (LASIX) 20 MG tablet; TAKE  2 tabs in AM daily  slighlty higher lasix dose, seems to help him  Essential hypertension  -     amLODIPine-benazepril (LOTREL) 10-20 MG per capsule; Take 1 capsule by mouth daily  -     Basic Metabolic Panel; Future  Came down, will watch. seems better to me today        No follow-ups on file.     Electronically signed by Rocio Andre MD on 3/29/22 at 3:10 PM EDT

## 2022-03-31 DIAGNOSIS — I48.19 PERSISTENT ATRIAL FIBRILLATION (HCC): ICD-10-CM

## 2022-03-31 DIAGNOSIS — I10 ESSENTIAL HYPERTENSION: ICD-10-CM

## 2022-03-31 LAB
ANION GAP SERPL CALCULATED.3IONS-SCNC: 12 MMOL/L (ref 7–16)
BUN BLDV-MCNC: 12 MG/DL (ref 6–23)
CALCIUM SERPL-MCNC: 9 MG/DL (ref 8.6–10.2)
CHLORIDE BLD-SCNC: 99 MMOL/L (ref 98–107)
CO2: 27 MMOL/L (ref 22–29)
CREAT SERPL-MCNC: 0.9 MG/DL (ref 0.7–1.2)
GFR AFRICAN AMERICAN: >60
GFR NON-AFRICAN AMERICAN: >60 ML/MIN/1.73
GLUCOSE BLD-MCNC: 101 MG/DL (ref 74–99)
POTASSIUM SERPL-SCNC: 4.2 MMOL/L (ref 3.5–5)
SODIUM BLD-SCNC: 138 MMOL/L (ref 132–146)

## 2022-05-18 ENCOUNTER — OFFICE VISIT (OUTPATIENT)
Dept: FAMILY MEDICINE CLINIC | Age: 82
End: 2022-05-18
Payer: MEDICARE

## 2022-05-18 VITALS
HEART RATE: 91 BPM | BODY MASS INDEX: 23.33 KG/M2 | RESPIRATION RATE: 18 BRPM | TEMPERATURE: 96.6 F | OXYGEN SATURATION: 95 % | SYSTOLIC BLOOD PRESSURE: 138 MMHG | DIASTOLIC BLOOD PRESSURE: 68 MMHG | WEIGHT: 176 LBS | HEIGHT: 73 IN

## 2022-05-18 DIAGNOSIS — H61.23 CERUMEN DEBRIS ON TYMPANIC MEMBRANE OF BOTH EARS: Primary | ICD-10-CM

## 2022-05-18 DIAGNOSIS — H90.3 SENSORINEURAL HEARING LOSS (SNHL) OF BOTH EARS: ICD-10-CM

## 2022-05-18 PROCEDURE — 99213 OFFICE O/P EST LOW 20 MIN: CPT | Performed by: FAMILY MEDICINE

## 2022-05-18 NOTE — PROGRESS NOTES
OFFICE NOTE    5/18/22  Name: Etta Valenzuela  FPY:[birth year]   Sex:male   Age:81 y.o. SUBJECTIVE  Chief Complaint   Patient presents with    Hearing Problem       HPI Therese Daniel came in today to address a hearing problem which is becoming worse. Seemed to be kind of a big deal for him to address this  Review of Systems   Trouble hearing his wife and picking up conversation in groups      Current Outpatient Medications:     neomycin-polymyxin-hydrocortisone (CORTISPORIN) 3.5-67292-7 otic solution, Place 4 drops in ear(s) 3 times daily for 10 days Instill into both Ears, Disp: 10 mL, Rfl: 0    carbamide peroxide (DEBROX) 6.5 % otic solution, Place 5 drops in ear(s) 2 times daily, Disp: 1 each, Rfl: 2    apixaban (ELIQUIS) 5 MG TABS tablet, Take 1 tablet by mouth 2 times daily, Disp: 60 tablet, Rfl: 5    furosemide (LASIX) 20 MG tablet, TAKE  2 tabs in AM daily, Disp: 180 tablet, Rfl: 1    amLODIPine-benazepril (LOTREL) 10-20 MG per capsule, Take 1 capsule by mouth daily, Disp: 90 capsule, Rfl: 1    tamsulosin (FLOMAX) 0.4 MG capsule, Take 1 capsule by mouth daily, Disp: 30 capsule, Rfl: 5    ursodiol (ACTIGALL) 300 MG capsule, Take 1 capsule by mouth 2 times daily, Disp: 180 capsule, Rfl: 1    Handicap Placard MISC, by Does not apply route 5 years.  OA as diagnosis, Disp: 2 each, Rfl: 0    Multiple Vitamins-Minerals (THERAPEUTIC MULTIVITAMIN-MINERALS) tablet, Take 1 tablet by mouth daily, Disp: , Rfl:     leuprolide acetate, 6 Month, (ELIGARD) 45 MG injection, Inject 45 mg into the skin once One injection every 6 months (Patient not taking: Reported on 5/18/2022), Disp: , Rfl:   Allergies   Allergen Reactions    Flecainide Dermatitis    Zoloft [Sertraline]        Past Medical History:   Diagnosis Date    Arthritis     Knees    Atrial fibrillation (HCC)     [facility] ([facility] [address]     skin cancer; Prostate cancer     CHF (congestive heart failure) (HCC)     Gallstone pancreatitis     H/O esophagogastroduodenoscopy 02/2013    dr Belton Epley no Hpylori, or Howell's     H/O ETOH abuse     sober for several years    Hypertension     MDRO (multiple drug resistant organisms) resistance     Pacemaker     REMOVED 2018 due to infection     Past Surgical History:   Procedure Laterality Date    ABDOMEN SURGERY      CATARACT REMOVAL Bilateral     CHOLECYSTECTOMY      COLONOSCOPY  06/11/2016    additional dates: 2003,02/28/2013    ENDOSCOPY, COLON, DIAGNOSTIC      ERCP      and ston removal 03/2007    EYE SURGERY Bilateral     cataracts    HIP SURGERY Left 4/24/2020    HIP HEMIARTHROPLASTY -- HILARY performed by Bird Florian DO at [facility] OTHER SURGICAL HISTORY      cardioversion    PACEMAKER PLACEMENT      LASER LEAD EXTRACTION  03/02/2018    SKIN BIOPSY      TONSILLECTOMY       Family History   Problem Relation Age of Onset    Hypertension Father     Heart Disease Father     Emphysema Father     Coronary Art Dis Mother     Osteoporosis Mother      Social History     Tobacco History     Smoking Status  Never Smoker    Smokeless Tobacco Use  Never Used          Alcohol History     Alcohol Use Status  No          Drug Use     Drug Use Status  No          Sexual Activity     Sexually Active  Not Currently Partners  Female                OBJECTIVE  Vitals:    05/18/22 1509   BP: 138/68   Pulse: 91   Resp: 18   Temp: 96.6 °F (35.9 °C)   TempSrc: Temporal   SpO2: 95%   Weight: 176 lb (79.8 kg)   Height: 6' 1\" (1.854 m)        Body mass index is 23.22 kg/m². Orders Placed This Encounter   Procedures    Ear wax removal        EXAM   Physical Exam  Vitals and nursing note reviewed. Constitutional:       Appearance: Normal appearance. He is normal weight. Comments: Presents in wheelchair again   HENT:      Right Ear: There is impacted cerumen. Left Ear: There is impacted cerumen. Ears:      Comments: After lavage, both EACs clear but with several superficial bleeding sites noted.  Drums intact Mouth/Throat:      Pharynx: Oropharynx is clear. No posterior oropharyngeal erythema. Eyes:      General: No scleral icterus. Conjunctiva/sclera: Conjunctivae normal.   Skin:     Coloration: Skin is not jaundiced. Findings: No bruising or rash. Neurological:      General: No focal deficit present. Mental Status: He is alert and oriented to person, place, and time. Psychiatric:         Mood and Affect: Mood normal.         Behavior: Behavior normal.           Sam was seen today for hearing problem. Diagnoses and all orders for this visit:    Cerumen debris on tympanic membrane of both ears    Sensorineural hearing loss (SNHL) of both ears    Other orders  -     Ear wax removal  -     neomycin-polymyxin-hydrocortisone (CORTISPORIN) 3.5-83686-2 otic solution; Place 4 drops in ear(s) 3 times daily for 10 days Instill into both Ears  -     carbamide peroxide (DEBROX) 6.5 % otic solution; Place 5 drops in ear(s) 2 times daily    Ear wax successfully removed will refer to audiologist but wife wanted to check with insurance first.      Return if symptoms worsen or fail to improve.     Electronically signed by Kizzy Werner MD on 5/18/22 at 3:51 PM EDT

## 2022-07-06 ENCOUNTER — OFFICE VISIT (OUTPATIENT)
Dept: FAMILY MEDICINE CLINIC | Age: 82
End: 2022-07-06
Payer: MEDICARE

## 2022-07-06 VITALS
HEART RATE: 60 BPM | DIASTOLIC BLOOD PRESSURE: 64 MMHG | SYSTOLIC BLOOD PRESSURE: 134 MMHG | OXYGEN SATURATION: 95 % | TEMPERATURE: 96.9 F | WEIGHT: 167 LBS | BODY MASS INDEX: 22.13 KG/M2 | HEIGHT: 73 IN | RESPIRATION RATE: 15 BRPM

## 2022-07-06 DIAGNOSIS — M25.552 LEFT HIP PAIN: Primary | ICD-10-CM

## 2022-07-06 DIAGNOSIS — Z96.642 S/P HIP REPLACEMENT, LEFT: ICD-10-CM

## 2022-07-06 PROCEDURE — 1123F ACP DISCUSS/DSCN MKR DOCD: CPT | Performed by: FAMILY MEDICINE

## 2022-07-06 PROCEDURE — 99213 OFFICE O/P EST LOW 20 MIN: CPT | Performed by: FAMILY MEDICINE

## 2022-07-06 RX ORDER — MULTIVIT-MIN/IRON/FOLIC ACID/K 18-600-40
CAPSULE ORAL
COMMUNITY

## 2022-07-06 RX ORDER — HYDROCODONE BITARTRATE AND ACETAMINOPHEN 5; 325 MG/1; MG/1
1 TABLET ORAL EVERY 6 HOURS PRN
Qty: 10 TABLET | Refills: 0 | Status: SHIPPED | OUTPATIENT
Start: 2022-07-06 | End: 2022-07-09

## 2022-07-06 NOTE — PROGRESS NOTES
Rfl: 0    leuprolide acetate, 6 Month, (ELIGARD) 45 MG injection, Inject 45 mg into the skin once One injection every 6 months , Disp: , Rfl:     Multiple Vitamins-Minerals (THERAPEUTIC MULTIVITAMIN-MINERALS) tablet, Take 1 tablet by mouth daily, Disp: , Rfl:     HYDROcodone-acetaminophen (NORCO) 5-325 MG per tablet, Take 1 tablet by mouth every 6 hours as needed for Pain for up to 3 days. Intended supply: 3 days. Take lowest dose possible to manage pain, Disp: 10 tablet, Rfl: 0     Past Medical History:   Diagnosis Date    Arthritis     Knees    Atrial fibrillation (HCC)     Cancer (HCC)     skin cancer; Prostate cancer     [facility]estive [facility] failure) [facility] [address]     Gallstone pancreatitis     H/O esophagogastroduodenoscopy 02/2013    dr Jelly Lawrence no Hpylori, or Howell's     H/O ETOH abuse     sober for several years    Hypertension     MDRO (multiple drug resistant organisms) resistance     Pacemaker     REMOVED 2018 due to infection       Sam was seen today for hip pain. Diagnoses and all orders for this visit:    Left hip pain  -     XR HIP LEFT (2-3 VIEWS); Future  -     HYDROcodone-acetaminophen (NORCO) 5-325 MG per tablet; Take 1 tablet by mouth every 6 hours as needed for Pain for up to 3 days. Intended supply: 3 days. Take lowest dose possible to manage pain    S/P hip replacement, left  -     XR HIP LEFT (2-3 VIEWS); Future  -     HYDROcodone-acetaminophen (NORCO) 5-325 MG per tablet; Take 1 tablet by mouth every 6 hours as needed for Pain for up to 3 days. Intended supply: 3 days.  Take lowest dose possible to manage pain       Xray neg  Cannot take anti-inflammatories  Short term Norco as he is in extreme pain      Buddy Allen MD

## 2022-07-13 ENCOUNTER — OFFICE VISIT (OUTPATIENT)
Dept: FAMILY MEDICINE CLINIC | Age: 82
End: 2022-07-13
Payer: MEDICARE

## 2022-07-13 VITALS
OXYGEN SATURATION: 97 % | HEIGHT: 73 IN | WEIGHT: 176 LBS | BODY MASS INDEX: 23.33 KG/M2 | SYSTOLIC BLOOD PRESSURE: 138 MMHG | RESPIRATION RATE: 18 BRPM | TEMPERATURE: 98.3 F | HEART RATE: 78 BPM | DIASTOLIC BLOOD PRESSURE: 76 MMHG

## 2022-07-13 DIAGNOSIS — M25.552 HIP PAIN, ACUTE, LEFT: Primary | ICD-10-CM

## 2022-07-13 PROCEDURE — 99213 OFFICE O/P EST LOW 20 MIN: CPT | Performed by: FAMILY MEDICINE

## 2022-07-13 PROCEDURE — 1123F ACP DISCUSS/DSCN MKR DOCD: CPT | Performed by: FAMILY MEDICINE

## 2022-07-13 RX ORDER — OXYCODONE AND ACETAMINOPHEN 7.5; 325 MG/1; MG/1
1 TABLET ORAL 2 TIMES DAILY
Qty: 20 TABLET | Refills: 0 | Status: SHIPPED | OUTPATIENT
Start: 2022-07-13 | End: 2022-07-23

## 2022-07-13 RX ORDER — METHYLPREDNISOLONE 4 MG/1
TABLET ORAL
Qty: 1 KIT | Refills: 0 | Status: SHIPPED | OUTPATIENT
Start: 2022-07-13 | End: 2022-07-19

## 2022-07-13 SDOH — ECONOMIC STABILITY: FOOD INSECURITY: WITHIN THE PAST 12 MONTHS, YOU WORRIED THAT YOUR FOOD WOULD RUN OUT BEFORE YOU GOT MONEY TO BUY MORE.: NEVER TRUE

## 2022-07-13 SDOH — ECONOMIC STABILITY: FOOD INSECURITY: WITHIN THE PAST 12 MONTHS, THE FOOD YOU BOUGHT JUST DIDN'T LAST AND YOU DIDN'T HAVE MONEY TO GET MORE.: NEVER TRUE

## 2022-07-13 ASSESSMENT — SOCIAL DETERMINANTS OF HEALTH (SDOH): HOW HARD IS IT FOR YOU TO PAY FOR THE VERY BASICS LIKE FOOD, HOUSING, MEDICAL CARE, AND HEATING?: NOT HARD AT ALL

## 2022-07-13 NOTE — PROGRESS NOTES
OFFICE NOTE    7/13/22  Name: Kat Hamm:[id number]   Sex:male   Age:81 y.o. SUBJECTIVE  Chief Complaint   Patient presents with    Follow-up     express     Hip Pain     left hip, hurts to apply pressure        HPI Believes he did something to his hip on or around 7-6. Had traumatic fracture and Dr. Hayder Martinez did ball and stem partial hip replacement 2 years ago. Had x-ray done which was negative. Pain severe, unrelenting. Pressure makes it worse but can use his walker. Review of Systems   No urine or fecal incontinance. No definite weakness. Current Outpatient Medications:     methylPREDNISolone (MEDROL DOSEPACK) 4 MG tablet, Take by mouth., Disp: 1 kit, Rfl: 0    oxyCODONE-acetaminophen (PERCOCET) 7.5-325 MG per tablet, Take 1 tablet by mouth 2 times daily for 10 days. Intended supply: 30 days, Disp: 20 tablet, Rfl: 0    Cholecalciferol (VITAMIN D) 50 MCG (2000 UT) CAPS capsule, Take by mouth, Disp: , Rfl:     Zinc Sulfate (ZINC 15 PO), Take by mouth, Disp: , Rfl:     metOLazone (ZAROXOLYN) 2.5 MG tablet, Take 2.5 mg by mouth as needed , Disp: , Rfl:     apixaban (ELIQUIS) 5 MG TABS tablet, Take 1 tablet by mouth 2 times daily, Disp: 60 tablet, Rfl: 5    furosemide (LASIX) 20 MG tablet, TAKE  2 tabs in AM daily, Disp: 180 tablet, Rfl: 1    amLODIPine-benazepril (LOTREL) 10-20 MG per capsule, Take 1 capsule by mouth daily, Disp: 90 capsule, Rfl: 1    tamsulosin (FLOMAX) 0.4 MG capsule, Take 1 capsule by mouth daily, Disp: 30 capsule, Rfl: 5    ursodiol (ACTIGALL) 300 MG capsule, Take 1 capsule by mouth 2 times daily, Disp: 180 capsule, Rfl: 1    Handicap Placard MISC, by Does not apply route 5 years.  OA as diagnosis, Disp: 2 each, Rfl: 0    leuprolide acetate, 6 Month, (ELIGARD) 45 MG injection, Inject 45 mg into the skin once One injection every 6 months , Disp: , Rfl:     Multiple Vitamins-Minerals (THERAPEUTIC MULTIVITAMIN-MINERALS) tablet, Take 1 tablet by mouth daily, Disp: , Rfl:   Allergies   Allergen Reactions    Flecainide Dermatitis    Zoloft [Sertraline]        Past Medical History:   Diagnosis Date    Arthritis     Knees    Atrial fibrillation (HCC)     Cancer (HCC)     skin cancer; Prostate cancer     [facility] [address]     Gallstone pancreatitis     H/O esophagogastroduodenoscopy 02/2013    dr Veronique Chang no Hpylori, or Howell's     H/O ETOH abuse     sober for several years    Hypertension     MDRO (multiple drug resistant organisms) resistance     Pacemaker     REMOVED 2018 due to infection     Past Surgical History:   Procedure Laterality Date    ABDOMEN SURGERY      CATARACT REMOVAL Bilateral     CHOLECYSTECTOMY      COLONOSCOPY  06/11/2016    additional dates: 2003,02/28/2013    ENDOSCOPY, COLON, DIAGNOSTIC      ERCP      and ston removal 03/2007    EYE SURGERY Bilateral     cataracts    HIP SURGERY Left 4/24/2020    HIP HEMIARTHROPLASTY -- HILARY performed by Opal Diaz DO at [facility] OTHER SURGICAL HISTORY      cardioversion    PACEMAKER PLACEMENT      LASER LEAD EXTRACTION  03/02/2018    SKIN BIOPSY      TONSILLECTOMY       Family History   Problem Relation Age of Onset    Hypertension Father     Heart Disease Father     Emphysema Father     Coronary Art Dis Mother     Osteoporosis Mother      Social History     Tobacco History     Smoking Status  Never Smoker    Smokeless Tobacco Use  Never Used          Alcohol History     Alcohol Use Status  No          Drug Use     Drug Use Status  No          Sexual Activity     Sexually Active  Not Currently Partners  Female                OBJECTIVE  Vitals:    07/13/22 1501   BP: 138/76   Pulse: 78   Resp: 18   Temp: 98.3 °F (36.8 °C)   TempSrc: Temporal   SpO2: 97%   Weight: 176 lb (79.8 kg)   Height: 6' 1\" (1.854 m)        Body mass index is 23.22 kg/m².     Orders Placed This Encounter   Procedures   Hardik Zhang MD, Orthopaedics and Rehabilitation, [place]     Referral Priority:   Routine     Referral Type:   Eval and Treat     Referral Reason:   Specialty Services Required     Referred to Provider:   Melina Hewitt MD     Requested Specialty:   Orthopedic Surgery     Number of Visits Requested:   1        EXAM   Physical Exam  Vitals and nursing note reviewed. Constitutional:       Appearance: Normal appearance. He is well-developed and normal weight. Comments: In wheelchair, able to stand   HENT:      Right Ear: Tympanic membrane, ear canal and external ear normal.      Left Ear: Tympanic membrane, ear canal and external ear normal.      Nose: Nose normal.   Eyes:      Conjunctiva/sclera: Conjunctivae normal.   Neck:      Thyroid: No thyroid mass or thyromegaly. Vascular: No JVD. Trachea: Trachea normal.   Cardiovascular:      Rate and Rhythm: Normal rate and regular rhythm. Heart sounds: Normal heart sounds. No murmur heard. No gallop. Pulmonary:      Effort: Pulmonary effort is normal.      Breath sounds: Normal breath sounds. No wheezing or rales. Comments: Seeing dr. 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 There is no distension. Palpations: Abdomen is soft. There is no mass. Tenderness: There is no abdominal tenderness. There is no guarding. Musculoskeletal:         General: Normal range of motion. Cervical back: Neck supple. Comments: Able to stand. Pain primarily in buttock, posterior thigh and lateral thigh. Occasionally goes ot foot. Tender greater trochanter and sciatic notch. ROM of hip not particularly bad. SLRs causes tightness and pain proximal hamstring. No spinous process tenderness. Mild sacral tenderness   Lymphadenopathy:      Cervical: No cervical adenopathy. Skin:     General: Skin is warm and dry. Findings: No rash.    Neurological:      Mental Status: He is alert and oriented to person, place, and time. Motor: No abnormal muscle tone. Psychiatric:         Behavior: Behavior normal.           Sam was seen today for follow-up and hip pain. Diagnoses and all orders for this visit:    Hip pain, acute, left  -     oxyCODONE-acetaminophen (PERCOCET) 7.5-325 MG per tablet; Take 1 tablet by mouth 2 times daily for 10 days. Intended supply: 30 days  -     Deepali Lopez MD, Orthopaedics and Rehabilitation, [facility]    Other orders  -     methylPREDNISolone (MEDROL DOSEPACK) 4 MG tablet; Take by mouth. No idea what he did. Suspect muscle tear with sciatic nerve root irritation but what am not sure. If ortho feels is not hip or prosthesis related might have him see Dr Mildred Lim        No follow-ups on file.     Electronically signed by Negrita Torres MD on 7/13/22 at 3:36 PM EDT

## 2022-07-18 ENCOUNTER — TELEPHONE (OUTPATIENT)
Dept: ORTHOPEDIC SURGERY | Age: 82
End: 2022-07-18

## 2022-07-18 NOTE — TELEPHONE ENCOUNTER
Referral sent by Dr. Lacey Pool MD    Referral received for Associated Diagnosis: Hip pain, acute, left (M25.552 [ICD-10-CM])    SX date - 4/23/2020  Procedure:  Left Hip Hemiarthroplasty  Surgeon:  Natalie Greenberg DO      Routed to Providers for consideration and scheduling recommendations.

## 2022-07-19 ENCOUNTER — TELEPHONE (OUTPATIENT)
Dept: FAMILY MEDICINE CLINIC | Age: 82
End: 2022-07-19

## 2022-07-19 NOTE — TELEPHONE ENCOUNTER
Wife calling in he is taking percocet and a steroid. On Sat&Sun in the a.m and yesterday afternoon around 4pm he has been experiencing possibly a med s/e. He becomes very cold&shivering for 45 min to a hour. He gets under the blankets and naps and it goes away. Then for a few hours after he is very hot and sweaty. He has a normal temp is a bit short of breath with a pulse ox around 88 to 90%ra but he is getting in with pulmonary. Do you think this is from the percocet?

## 2022-07-19 NOTE — TELEPHONE ENCOUNTER
The shivering might be a fever and the fever resolves when he sweats.  Percocet shouldn't do this but might want to hold it few times to verify this

## 2022-07-20 ENCOUNTER — OFFICE VISIT (OUTPATIENT)
Dept: FAMILY MEDICINE CLINIC | Age: 82
End: 2022-07-20
Payer: MEDICARE

## 2022-07-20 ENCOUNTER — APPOINTMENT (OUTPATIENT)
Dept: GENERAL RADIOLOGY | Age: 82
End: 2022-07-20
Payer: MEDICARE

## 2022-07-20 ENCOUNTER — HOSPITAL ENCOUNTER (EMERGENCY)
Age: 82
Discharge: HOME | End: 2022-07-20
Attending: EMERGENCY MEDICINE
Payer: MEDICARE

## 2022-07-20 VITALS
HEIGHT: 73 IN | DIASTOLIC BLOOD PRESSURE: 60 MMHG | HEART RATE: 66 BPM | WEIGHT: 176 LBS | OXYGEN SATURATION: 89 % | TEMPERATURE: 97.2 F | SYSTOLIC BLOOD PRESSURE: 102 MMHG | BODY MASS INDEX: 23.33 KG/M2

## 2022-07-20 VITALS
OXYGEN SATURATION: 93 % | RESPIRATION RATE: 14 BRPM | HEART RATE: 68 BPM | BODY MASS INDEX: 23.33 KG/M2 | HEIGHT: 73 IN | DIASTOLIC BLOOD PRESSURE: 62 MMHG | SYSTOLIC BLOOD PRESSURE: 114 MMHG | WEIGHT: 176 LBS | TEMPERATURE: 97.7 F

## 2022-07-20 DIAGNOSIS — R53.1 WEAKNESS: ICD-10-CM

## 2022-07-20 DIAGNOSIS — J44.9 CHRONIC OBSTRUCTIVE PULMONARY DISEASE, UNSPECIFIED COPD TYPE (HCC): ICD-10-CM

## 2022-07-20 DIAGNOSIS — R09.02 HYPOXIA: ICD-10-CM

## 2022-07-20 DIAGNOSIS — I50.32 CHRONIC DIASTOLIC HEART FAILURE (HCC): Primary | Chronic | ICD-10-CM

## 2022-07-20 DIAGNOSIS — I48.0 PAROXYSMAL ATRIAL FIBRILLATION (HCC): ICD-10-CM

## 2022-07-20 DIAGNOSIS — Z53.21 ELOPED FROM EMERGENCY DEPARTMENT: Primary | ICD-10-CM

## 2022-07-20 PROCEDURE — 71046 X-RAY EXAM CHEST 2 VIEWS: CPT

## 2022-07-20 PROCEDURE — 99213 OFFICE O/P EST LOW 20 MIN: CPT | Performed by: FAMILY MEDICINE

## 2022-07-20 PROCEDURE — 99284 EMERGENCY DEPT VISIT MOD MDM: CPT

## 2022-07-20 PROCEDURE — 93005 ELECTROCARDIOGRAM TRACING: CPT | Performed by: PHYSICIAN ASSISTANT

## 2022-07-20 PROCEDURE — 1123F ACP DISCUSS/DSCN MKR DOCD: CPT | Performed by: FAMILY MEDICINE

## 2022-07-20 ASSESSMENT — ENCOUNTER SYMPTOMS
COUGH: 1
SHORTNESS OF BREATH: 1
CHEST TIGHTNESS: 1

## 2022-07-20 NOTE — ED NOTES
Department of Emergency Medicine    FIRST PROVIDER ELOPEMENT NOTE                 [facility]          7/20/22  4:20 PM EDT    MRN: [id number]    HPI: Quinten Santana is a 80 y.o. male who presents to the ED with the following complaint: Shortness of Breath (Worsening x3-4 days)      (Please refer to [address] [place] [address] for pertinent ROS and PE documentation)    Labs:  Results for orders placed or performed during the hospital encounter of 07/20/22   EKG 12 Lead   Result Value Ref Range    Ventricular Rate 63 BPM    Atrial Rate 63 BPM    QRS Duration 92 ms    Q-T Interval 368 ms    QTc Calculation (Bazett) 376 ms    R Axis -49 degrees    T Axis 70 degrees     Imaging: All Radiology results interpreted by Radiologist unless otherwise noted. XR CHEST (2 VW)   Final Result   1. There are no findings of pneumonia or failure   2. Right lung base atelectasis small right pleural effusion   3. Chronic elevation of the left hemidiaphragm with adjacent atelectasis and   trace left pleural effusion           ED Course    Medications - No data to display     -------------------------  Disposition    Patient ELOPED from the department prior to completion of evaluation after triage. Results of triage orders that were completed at time of elopement as indicated above were reviewed.     Diagnosis at Time of Elopement: (Based on presenting complaint/available test results):     Electronically signed by Khanh Rivera PA-C   DD: 7/20/22       Khanh Rivera PA-C  07/20/22 3169

## 2022-07-20 NOTE — TELEPHONE ENCOUNTER
Kristal informed&voiced understanding. She states his o2 has been around 86% to 90%ra. He cannot see pulm til next week.  She is going to bring him into walk in today for eval.

## 2022-07-20 NOTE — TELEPHONE ENCOUNTER
Call placed to patient's spouse, appointment made at this time. Future Appointments   Date Time Provider Keyshawn Simpson   7/26/2022  4:00 PM Allison Keys MD [facility]   8/16/2022  9:45 AM DO DANYELLE Chavez [facility]   8/25/2022  9:15 AM Lilliam Orellana MD [facility]     Directions to office provided and patient verbalized understanding and is agreeable with plan.

## 2022-07-20 NOTE — ED NOTES
Department of Emergency Medicine    FIRST PROVIDER TRIAGE NOTE             [facility]           7/20/22  1:06 PM EDT    Date of Encounter: 7/20/22   MRN: [id number]    Vitals:    07/20/22 1306   BP: 114/62   Pulse: 68   Resp: 14   Temp: 97.7 °F (36.5 °C)   TempSrc: Oral   SpO2: 93%   Weight: 176 lb (79.8 kg)   Height: 6' 1\" (1.854 m)      HPI: Kat Villarreal is a 80 y.o. male who presents to the ED for Shortness of Breath (Worsening x3-4 days)  Cough and chills, no fever     ROS: Negative for abd pain, vomiting, or diarrhea. Physical Exam:   Gen Appearance/Constitutional: alert  CV: regular rate  Pulm: diminished      Initial Plan of Care: All treatment areas with department are currently occupied. Plan to order/Initiate the following while awaiting opening in ED: labs, EKG, and imaging studies.     Initial Plan of Care: Initiate Treatment-Testing, Proceed toTreatment Area When Bed Available for ED Attending/MLP to Continue Care    Electronically signed by Gail Sneed PA-C   DD: 7/20/22       Gail Sneed PA-C  07/20/22 6039

## 2022-07-20 NOTE — PROGRESS NOTES
Maira Patel (:  1940) is a 80 y.o. male,Established patient, here for evaluation of the following chief complaint(s):  Shortness of Breath (This has been an ongoing issue but has worsened over the last 3-4 days. Pt was just placed on steroids and percocet for back pain and thought maybe it was from the medication.)         ASSESSMENT/PLAN:  1. Chronic diastolic heart failure (HCC)  2. Paroxysmal atrial fibrillation ([facility] [address]  3. Chronic obstructive pulmonary disease, unspecified COPD type ([email or web address]  4. Hypoxia  5. Weakness    Recently had CT scan at [facility] showing pericardial effusion and bilateral pleural effusions with some ascites as well. Given the patient's hypoxia and history of fever for the last several days he was sent over to [facility] for further evaluation and treatment. Report is been called. Patient went by private vehicle, refused EMS transport. No follow-ups on file. Subjective   SUBJECTIVE/OBJECTIVE:  Shortness of Breath  Associated symptoms include a fever. Presents today for worsening shortness of breath over the last 3 to 4 days. Patient does have a history of longstanding lung issues and does follow with pulmonology. Recently had CAT scan and results are as noted above. Oxygen levels have been running in the mid to high 80s at home. Also has been having fever and chills with worsening weakness. Recently placed on Percocet and Medrol with no relief in symptoms. Denies any current chest pain. Review of Systems   Constitutional:  Positive for chills and fever. Respiratory:  Positive for cough, chest tightness and shortness of breath. All other systems reviewed and are negative. Current Outpatient Medications:     oxyCODONE-acetaminophen (PERCOCET) 7.5-325 MG per tablet, Take 1 tablet by mouth 2 times daily for 10 days.  Intended supply: 30 days, Disp: 20 tablet, Rfl: 0    Cholecalciferol (VITAMIN D) 50 MCG ( UT) CAPS capsule, Take by mouth, Disp: , Rfl:     Zinc Sulfate (ZINC 15 PO), Take by mouth, Disp: , Rfl:     metOLazone (ZAROXOLYN) 2.5 MG tablet, Take 2.5 mg by mouth as needed , Disp: , Rfl:     apixaban (ELIQUIS) 5 MG TABS tablet, Take 1 tablet by mouth 2 times daily, Disp: 60 tablet, Rfl: 5    furosemide (LASIX) 20 MG tablet, TAKE  2 tabs in AM daily, Disp: 180 tablet, Rfl: 1    amLODIPine-benazepril (LOTREL) 10-20 MG per capsule, Take 1 capsule by mouth daily, Disp: 90 capsule, Rfl: 1    tamsulosin (FLOMAX) 0.4 MG capsule, Take 1 capsule by mouth daily, Disp: 30 capsule, Rfl: 5    ursodiol (ACTIGALL) 300 MG capsule, Take 1 capsule by mouth 2 times daily, Disp: 180 capsule, Rfl: 1    Handicap Placard MISC, by Does not apply route 5 years.  OA as diagnosis, Disp: 2 each, Rfl: 0    leuprolide acetate, 6 Month, (ELIGARD) 45 MG injection, Inject 45 mg into the skin once One injection every 6 months , Disp: , Rfl:     Multiple Vitamins-Minerals (THERAPEUTIC MULTIVITAMIN-MINERALS) tablet, Take 1 tablet by mouth daily, Disp: , Rfl:    Patient Active Problem List   Diagnosis    Paroxysmal atrial fibrillation (HCC)    Essential hypertension    Chronic diastolic heart failure (HCC)    Mitral valve regurgitation    Closed displaced fracture of left femoral neck (HCC)    Closed left hip fracture, initial encounter (HCC)    Chronic anticoagulation-Eliquis    Status post hip hemiarthroplasty    Prostate cancer (HCC)    Chronic obstructive pulmonary disease (HCC)    Hypoxia    Weakness     Past Medical History:   Diagnosis Date    Arthritis     Knees    Atrial fibrillation (HCC)     Cancer (HCC)     skin cancer; Prostate cancer     CHF (congestive heart failure) (HCC)     Chronic obstructive pulmonary disease ([facility] [address] 7/20/2022    Gallstone pancreatitis     H/O esophagogastroduodenoscopy 02/2013    dr Herbert Pérez no Hpylori, or Howell's     H/O ETOH abuse     sober for several years    Hypertension     MDRO (multiple drug resistant organisms) resistance Pacemaker     REMOVED 2018 due to infection     Past Surgical History:   Procedure Laterality Date    ABDOMEN SURGERY      CATARACT REMOVAL Bilateral     CHOLECYSTECTOMY      COLONOSCOPY  06/11/2016    additional dates: 2003,02/28/2013    ENDOSCOPY, COLON, DIAGNOSTIC      ERCP      and ston removal 03/2007    EYE SURGERY Bilateral     cataracts    HIP SURGERY Left 4/24/2020    HIP HEMIARTHROPLASTY -- HILARY performed by Balwinder Baez DO at [place] [address]      cardioversion    PACEMAKER PLACEMENT      LASER LEAD EXTRACTION  03/02/2018    SKIN BIOPSY      TONSILLECTOMY       Social History     Socioeconomic History    Marital status:      Spouse name: June    Number of children: 1    Years of education: 16    Highest education level: Not on file   Occupational History    Not on file   Tobacco Use    Smoking status: Never    Smokeless tobacco: Never   Vaping Use    Vaping Use: Never used   Substance and Sexual Activity    Alcohol use: No    Drug use: No    Sexual activity: Not Currently     Partners: Female   Other Topics Concern    Not on file   Social History Narrative    Not on file     Social Determinants of Health     Financial Resource Strain: Low Risk     Difficulty of Paying Living Expenses: Not hard at all   Food Insecurity: No Food Insecurity    Worried About Running Out of Food in the Last Year: Never true    Ran Out of Food in the Last Year: Never true   Transportation Needs: Not on file   Physical Activity: Not on file   Stress: Not on file   Social Connections: Not on file   Intimate Partner Violence: Not on file   Housing Stability: Not on file     Family History   Problem Relation Age of Onset    Hypertension Father     Heart Disease Father     Emphysema Father     Coronary Art Dis Mother     Osteoporosis Mother       There are no preventive care reminders to display for this patient. There are no preventive care reminders to display for this patient.    There are no preventive care reminders to display for this patient. Health Maintenance Due   Topic    DTaP/Tdap/Td vaccine (1 - Tdap)    Shingles vaccine (1 of 2)      Health Maintenance   Topic Date Due    DTaP/Tdap/Td vaccine (1 - Tdap) Never done    Shingles vaccine (1 of 2) Never done    COVID-19 Vaccine (3 - Booster for Moderna series) 07/26/2021    Annual Wellness Visit (AWV)  01/27/2022    Flu vaccine (1) 09/01/2022    Prostate Specific Antigen (PSA) Screening or Monitoring  01/25/2023    Depression Screen  02/28/2023    Pneumococcal 65+ years Vaccine  Completed    Hepatitis A vaccine  Aged Out    Hepatitis B vaccine  Aged Out    Hib vaccine  Aged Out    Meningococcal (ACWY) vaccine  Aged Out      There are no preventive care reminders to display for this patient. There are no preventive care reminders to display for this patient. /60   Pulse 66   Temp 97.2 °F (36.2 °C) (Temporal)   Ht 6' 1\" (1.854 m)   Wt 176 lb (79.8 kg)   SpO2 (!) 89%   BMI 23.22 kg/m²     Objective   Physical Exam  Vitals reviewed. Constitutional:       Appearance: He is well-developed. He is ill-appearing. HENT:      Head: Normocephalic and atraumatic. Right Ear: External ear normal.      Left Ear: External ear normal.      Nose: Nose normal.   Eyes:      Conjunctiva/sclera: Conjunctivae normal.      Pupils: Pupils are equal, round, and reactive to light. Cardiovascular:      Rate and Rhythm: Normal rate and regular rhythm. Heart sounds: Normal heart sounds. Pulmonary:      Effort: Pulmonary effort is normal.      Breath sounds: Decreased breath sounds and wheezing present. Abdominal:      General: Bowel sounds are normal.      Palpations: Abdomen is soft. Musculoskeletal:         General: Normal range of motion. Cervical back: Normal range of motion and neck supple. Skin:     General: Skin is warm and dry. Findings: No erythema.    Neurological:      Mental Status: He is alert and oriented to person, place, and time. Cranial Nerves: No cranial nerve deficit. Psychiatric:         Behavior: Behavior normal.         Judgment: Judgment normal.                An electronic signature was used to authenticate this note.     --Syd Barajas, DO

## 2022-07-23 LAB
EKG ATRIAL RATE: 63 BPM
EKG Q-T INTERVAL: 368 MS
EKG QRS DURATION: 92 MS
EKG QTC CALCULATION (BAZETT): 376 MS
EKG R AXIS: -49 DEGREES
EKG T AXIS: 70 DEGREES
EKG VENTRICULAR RATE: 63 BPM
SARS-COV-2: NOT DETECTED

## 2022-08-01 ENCOUNTER — TELEPHONE (OUTPATIENT)
Dept: FAMILY MEDICINE CLINIC | Age: 82
End: 2022-08-01

## 2022-08-01 NOTE — TELEPHONE ENCOUNTER
----- Message from [address] sent at 8/1/2022  8:58 AM EDT -----  Subject: Appointment Request    Reason for Call: Established Patient Appointment needed: Routine Hospital   Follow Up    QUESTIONS    Reason for appointment request? Available appointments did not meet   patient need     Additional Information for Provider? Victoria was discharged from hospital   on [facility]) and needs a hospital f/u. Tuesday's time at 2:45   does not work for patient. Next available appt isn't until 8/11 and he was   told to be seen within 7 days of discharge. Please call to schedule   patient.  If nothing is available they will take the appt for 8/2 at 2:45  ---------------------------------------------------------------------------  --------------  [id number] [employer]  [phone]; OK to leave message on voicemail  ---------------------------------------------------------------------------  --------------  SCRIPT ANSWERS  COVID Screen: Srikanth Junior

## 2022-08-01 NOTE — TELEPHONE ENCOUNTER
Dr. Selene Howell gave a verbal and stated that next Thursday should be okay to wait to see patient. Called and spoke with Kristal regarding this and she was informed and gave a verbal understanding, okay per hipaa. Appointment made.    Last Appointment:  7/13/2022  Future Appointments   Date Time Provider Keyshawn Simpson   8/11/2022 11:00 AM MD BRAVO Bui [facility]   8/16/2022  9:45 AM DO DANYELLE Martinez [facility]   8/17/2022  2:30 PM Ovi Carney APRN - NP [facility]   8/25/2022  9:15 AM MD BRAVO Bui [facility]

## 2022-08-11 ENCOUNTER — OFFICE VISIT (OUTPATIENT)
Dept: FAMILY MEDICINE CLINIC | Age: 82
End: 2022-08-11
Payer: MEDICARE

## 2022-08-11 VITALS
HEIGHT: 73 IN | OXYGEN SATURATION: 97 % | SYSTOLIC BLOOD PRESSURE: 122 MMHG | BODY MASS INDEX: 22.13 KG/M2 | WEIGHT: 167 LBS | DIASTOLIC BLOOD PRESSURE: 68 MMHG | TEMPERATURE: 97.6 F | HEART RATE: 58 BPM | RESPIRATION RATE: 16 BRPM

## 2022-08-11 DIAGNOSIS — J44.9 CHRONIC OBSTRUCTIVE PULMONARY DISEASE, UNSPECIFIED COPD TYPE (HCC): ICD-10-CM

## 2022-08-11 DIAGNOSIS — I50.22 CHRONIC SYSTOLIC (CONGESTIVE) HEART FAILURE (HCC): ICD-10-CM

## 2022-08-11 DIAGNOSIS — Z09 HOSPITAL DISCHARGE FOLLOW-UP: Primary | ICD-10-CM

## 2022-08-11 DIAGNOSIS — I10 ESSENTIAL HYPERTENSION: ICD-10-CM

## 2022-08-11 PROCEDURE — 1123F ACP DISCUSS/DSCN MKR DOCD: CPT | Performed by: FAMILY MEDICINE

## 2022-08-11 PROCEDURE — 1111F DSCHRG MED/CURRENT MED MERGE: CPT | Performed by: FAMILY MEDICINE

## 2022-08-11 PROCEDURE — 99214 OFFICE O/P EST MOD 30 MIN: CPT | Performed by: FAMILY MEDICINE

## 2022-08-11 RX ORDER — ACETAMINOPHEN 325 MG/1
650 TABLET ORAL EVERY 6 HOURS PRN
COMMUNITY

## 2022-08-11 RX ORDER — POTASSIUM BICARBONATE 25 MEQ/1
25 TABLET, EFFERVESCENT ORAL DAILY
Qty: 30 TABLET | Refills: 5 | Status: SHIPPED | OUTPATIENT
Start: 2022-08-11 | End: 2023-08-11

## 2022-08-11 RX ORDER — URSODIOL 300 MG/1
300 CAPSULE ORAL 2 TIMES DAILY
Qty: 180 CAPSULE | Refills: 1 | Status: SHIPPED | OUTPATIENT
Start: 2022-08-11

## 2022-08-11 RX ORDER — BUMETANIDE 1 MG/1
1 TABLET ORAL 2 TIMES DAILY
COMMUNITY

## 2022-08-11 NOTE — PROGRESS NOTES
OFFICE NOTE    8/11/22  Name: Juwan Chang  UYQ:[birth year]   Sex:male   Age:82 y.o. SUBJECTIVE  Chief Complaint   Patient presents with    Follow-Up from Hospital     Breathing difficulties          HPI came in for transition to care. Discharged from [facility] on 8/1. Apparently had pleural effusion that was not clearly seen on CXR but showed up on CT and was drained with clear relief and improvement. May reaccumulate. No indication of cancer    Review of Systems   Constitutional:  Positive for activity change and fatigue. Negative for appetite change, fever and unexpected weight change. HENT:  Negative for congestion, ear pain, hearing loss, sinus pain, sore throat and trouble swallowing. Eyes:  Negative for photophobia, redness and visual disturbance. Respiratory:  Positive for shortness of breath. Negative for cough and wheezing. Cardiovascular:  Negative for chest pain, palpitations and leg swelling. Gastrointestinal:  Negative for abdominal pain, blood in stool, constipation, diarrhea and vomiting. Endocrine: Negative for cold intolerance, polydipsia and polyuria. Genitourinary:  Positive for frequency and urgency. Negative for difficulty urinating, genital sores and hematuria. Musculoskeletal:  Positive for arthralgias. Negative for back pain and joint swelling. Skin:  Negative for pallor and rash. Allergic/Immunologic: Negative for food allergies. Neurological:  Negative for dizziness, tremors, syncope and headaches. Hematological:  Negative for adenopathy. Does not bruise/bleed easily. Psychiatric/Behavioral:  Negative for agitation, dysphoric mood, hallucinations and sleep disturbance. Current Outpatient Medications:     bumetanide (BUMEX) 1 MG tablet, Take 1 mg by mouth in the morning and 1 mg before bedtime. , Disp: , Rfl:     acetaminophen (TYLENOL) 325 MG tablet, Take 650 mg by mouth every 6 hours as needed for Pain, Disp: , Rfl:     ursodiol (ACTIGALL) 300 MG capsule, Take 1 capsule by mouth in the morning and 1 capsule before bedtime. , Disp: 180 capsule, Rfl: 1    potassium bicarbonate (EFFER-K) 25 MEQ disintegrating tablet, Take 1 tablet by mouth in the morning., Disp: 30 tablet, Rfl: 5    Cholecalciferol (VITAMIN D) 50 MCG (2000 UT) CAPS capsule, Take by mouth, Disp: , Rfl:     Zinc Sulfate (ZINC 15 PO), Take by mouth, Disp: , Rfl:     apixaban (ELIQUIS) 5 MG TABS tablet, Take 1 tablet by mouth 2 times daily, Disp: 60 tablet, Rfl: 5    tamsulosin (FLOMAX) 0.4 MG capsule, Take 1 capsule by mouth daily, Disp: 30 capsule, Rfl: 5    Handicap Placard MISC, by Does not apply route 5 years.  OA as diagnosis, Disp: 2 each, Rfl: 0    leuprolide acetate, 6 Month, (ELIGARD) 45 MG injection, Inject 45 mg into the skin once One injection every 6 months, Disp: , Rfl:     Multiple Vitamins-Minerals (THERAPEUTIC MULTIVITAMIN-MINERALS) tablet, Take 1 tablet by mouth daily, Disp: , Rfl:   Allergies   Allergen Reactions    Flecainide Dermatitis    Zoloft [Sertraline]        Past Medical History:   Diagnosis Date    Arthritis     Knees    Atrial fibrillation ([facility] [address]     Cancer ([facility] [address]     skin cancer; Prostate cancer     CHF (congestive heart failure) (HCC)     Chronic obstructive pulmonary disease ([facility] [address] 7/20/2022    Gallstone pancreatitis     H/O esophagogastroduodenoscopy 02/2013    dr Mohsen Calzada no Hpylori, or Howell's     H/O ETOH abuse     sober for several years    Hypertension     MDRO (multiple drug resistant organisms) resistance     Pacemaker     REMOVED 2018 due to infection     Past Surgical History:   Procedure Laterality Date    ABDOMEN SURGERY      CATARACT REMOVAL Bilateral     CHOLECYSTECTOMY      COLONOSCOPY  06/11/2016    additional dates: 2003,02/28/2013    ENDOSCOPY, COLON, DIAGNOSTIC      ERCP      and ston removal 03/2007    EYE SURGERY Bilateral     cataracts    HIP SURGERY Left 4/24/2020    HIP HEMIARTHROPLASTY -- [facility] performed by Yasmany Whitfield DO at [address] [place] [address] OTHER SURGICAL HISTORY      cardioversion    PACEMAKER PLACEMENT      LASER LEAD EXTRACTION  03/02/2018    SKIN BIOPSY      TONSILLECTOMY       Family History   Problem Relation Age of Onset    Hypertension Father     Heart Disease Father     Emphysema Father     Coronary Art Dis Mother     Osteoporosis Mother      Social History       Tobacco History       Smoking Status  Never      Smokeless Tobacco Use  Never              Alcohol History       Alcohol Use Status  No              Drug Use       Drug Use Status  No              Sexual Activity       Sexually Active  Not Currently Partners  Female                    OBJECTIVE  Vitals:    08/11/22 1231   BP: 122/68   Pulse: 58   Resp: 16   Temp: 97.6 °F (36.4 °C)   TempSrc: Temporal   SpO2: 97%   Weight: 167 lb (75.8 kg)   Height: 6' 1\" (1.854 m)        Body mass index is 22.03 kg/m². Orders Placed This Encounter   Procedures    NC DISCHARGE MEDS RECONCILED W/ CURRENT OUTPATIENT MED LIST        EXAM   Physical Exam  Vitals and nursing note reviewed. Constitutional:       Appearance: Normal appearance. He is normal weight. HENT:      Right Ear: Tympanic membrane and external ear normal.      Left Ear: Tympanic membrane and external ear normal.      Nose: Congestion present. Mouth/Throat:      Pharynx: Oropharynx is clear. No posterior oropharyngeal erythema. Eyes:      Conjunctiva/sclera: Conjunctivae normal.      Pupils: Pupils are equal, round, and reactive to light. Cardiovascular:      Rate and Rhythm: Bradycardia present. Rhythm irregular. Heart sounds: Murmur heard. Pulmonary:      Effort: Pulmonary effort is normal.      Breath sounds: Normal breath sounds. No wheezing, rhonchi or rales. Comments: Percussion seems consistent with no to small effusion  Abdominal:      General: Bowel sounds are normal.      Palpations: There is no mass. Tenderness: There is no abdominal tenderness.    Musculoskeletal:         General: Tenderness present. No swelling. Cervical back: No tenderness. Right lower leg: No edema. Left lower leg: No edema. Lymphadenopathy:      Cervical: No cervical adenopathy. Skin:     Coloration: Skin is not jaundiced. Findings: Bruising and rash present. Neurological:      General: No focal deficit present. Mental Status: He is alert and oriented to person, place, and time. Psychiatric:         Mood and Affect: Mood normal.         Behavior: Behavior normal.         Sam was seen today for follow-up from hospital.    Diagnoses and all orders for this visit:    Hospital discharge follow-up  -     RI DISCHARGE MEDS RECONCILED W/ CURRENT OUTPATIENT MED LIST    Essential hypertension  Seems well controlled. On Bumex primarily  Chronic systolic (congestive) heart failure  On diuretic, clearly better, not sure this will maintain him. Some thought effusions was secondary to heart problems  Chronic obstructive pulmonary disease, unspecified COPD type ([address]  Combined COPD and silicosis or fibrosis. No indication of mesothelioma  Other orders  -     ursodiol (ACTIGALL) 300 MG capsule; Take 1 capsule by mouth in the morning and 1 capsule before bedtime. -     potassium bicarbonate (EFFER-K) 25 MEQ disintegrating tablet; Take 1 tablet by mouth in the morning. Recheck in 2 mos       No follow-ups on file.     Electronically signed by Daniella Elder MD on 8/11/22 at 1:10 PM EDT

## 2022-08-13 ASSESSMENT — ENCOUNTER SYMPTOMS
COUGH: 0
BLOOD IN STOOL: 0
CONSTIPATION: 0
EYE REDNESS: 0
WHEEZING: 0
BACK PAIN: 0
DIARRHEA: 0
SORE THROAT: 0
TROUBLE SWALLOWING: 0
PHOTOPHOBIA: 0
VOMITING: 0
ABDOMINAL PAIN: 0
SINUS PAIN: 0
SHORTNESS OF BREATH: 1

## 2022-08-15 DIAGNOSIS — R52 PAIN: Primary | ICD-10-CM

## 2022-08-16 ENCOUNTER — OFFICE VISIT (OUTPATIENT)
Dept: ORTHOPEDIC SURGERY | Age: 82
End: 2022-08-16
Payer: MEDICARE

## 2022-08-16 VITALS — WEIGHT: 167 LBS | BODY MASS INDEX: 22.13 KG/M2 | HEIGHT: 73 IN

## 2022-08-16 DIAGNOSIS — Z96.642 HISTORY OF LEFT HIP HEMIARTHROPLASTY: Primary | ICD-10-CM

## 2022-08-16 DIAGNOSIS — M70.62 GREATER TROCHANTERIC BURSITIS OF LEFT HIP: ICD-10-CM

## 2022-08-16 PROCEDURE — 1123F ACP DISCUSS/DSCN MKR DOCD: CPT | Performed by: PHYSICIAN ASSISTANT

## 2022-08-16 PROCEDURE — 99214 OFFICE O/P EST MOD 30 MIN: CPT | Performed by: PHYSICIAN ASSISTANT

## 2022-08-16 PROCEDURE — 20610 DRAIN/INJ JOINT/BURSA W/O US: CPT | Performed by: PHYSICIAN ASSISTANT

## 2022-08-16 RX ORDER — LIDOCAINE HYDROCHLORIDE 10 MG/ML
3 INJECTION, SOLUTION INFILTRATION; PERINEURAL ONCE
Status: COMPLETED | OUTPATIENT
Start: 2022-08-16 | End: 2022-08-16

## 2022-08-16 RX ORDER — TRIAMCINOLONE ACETONIDE 40 MG/ML
40 INJECTION, SUSPENSION INTRA-ARTICULAR; INTRAMUSCULAR ONCE
Status: COMPLETED | OUTPATIENT
Start: 2022-08-16 | End: 2022-08-16

## 2022-08-16 RX ORDER — BUPIVACAINE HYDROCHLORIDE 2.5 MG/ML
3 INJECTION, SOLUTION EPIDURAL; INFILTRATION; INTRACAUDAL ONCE
Status: COMPLETED | OUTPATIENT
Start: 2022-08-16 | End: 2022-08-16

## 2022-08-16 RX ADMIN — BUPIVACAINE HYDROCHLORIDE 7.5 MG: 2.5 INJECTION, SOLUTION EPIDURAL; INFILTRATION; INTRACAUDAL at 11:15

## 2022-08-16 RX ADMIN — TRIAMCINOLONE ACETONIDE 40 MG: 40 INJECTION, SUSPENSION INTRA-ARTICULAR; INTRAMUSCULAR at 11:15

## 2022-08-16 RX ADMIN — LIDOCAINE HYDROCHLORIDE 3 ML: 10 INJECTION, SOLUTION INFILTRATION; PERINEURAL at 11:21

## 2022-08-16 NOTE — PROGRESS NOTES
Chief Complaint   Patient presents with    Pain     L hip pain that started about a month ago and last for a few weeks. Patient usually ambulated with walker at home. Patient completed PT after surgery. OP:SURGEON: Dr. Alexsandra Ngo DO  DATE OF PROCEDURE: 4/24/2020  PROCEDURE:L hip hemiarthroplasty     Subjective:  Tonny Lesches is has past surgical hx of the above and is following up today regarding L lateral hip pain that he says has always been present since his surgery to some degree, but has worsened starting about 1 month ago without inciting injury or fall. Denies groin pain or much thigh pain. Most of the pain is focal to the greater trochanter region. States he cannot lay on his R side secondary to severe pain. States he has been WB with cane, but difficult to bear much weight on that side. He does have hx of prostate CA, but states his PSA has been WNL and takes leuprolide and his recent course of prostate monitoring has been unremarkable. Does not report any long bone pain or other complaints today. Review of Systems -  all pertinent positives and negatives in HPI. Objective:    General: Alert and oriented X 3, normocephalic atraumatic, external ears and eye normal, sclera clear, no acute distress, respirations easy and unlabored with no audible wheezes, skin warm and dry, speech and dress appropriate for noted age, affect euthymic. Extremity:  Left Lower Extremity  Skin clean dry and intact, without signs of infection  Incision healed  Moderate-severe focal TTP at the L greater trochanter   Minimal to no TTP elsewhere about the buttock, hip, thigh  Passive IR and ER of the hip without much increase to tenderness  Compartments supple throughout thigh and leg  Calf supple and nontender  Demonstrates active knee flexion/extension, ankle plantar/dorsiflexion/great toe extension.    States sensation intact to touch in sural/deep peroneal/superficial peroneal/saphenous/posterior tibial nerve distributions to foot/ankle. Palpable dorsalis pedis and posterior tibialis pulses, cap refill brisk in toes, foot warm/perfused. Ht 6' 1\" (1.854 m)   Wt 167 lb (75.8 kg)   BMI 22.03 kg/m²     XR:   3 views of L hip and pelvis demonstrating L hip cora-arthroplasty. arthroplasty remains intact without interval displacement, loosening, or failure. No significant change in alignment. There is an area of lucency just inferior to the stem, but this has been present since April 2020f rom post-op films and to the same degree as seen today since the summer of 2020 without much interval change. Does not correlate to pain on exam. No acute fractures or dislocations or any other osseus abnormality identified. Discussed with Dr Marcella Menjivar. Assessment:   Diagnosis Orders   1. History of left hip hemiarthroplasty        2. Greater trochanteric bursitis of left hip  SC ARTHROCENTESIS ASPIR&/INJ MAJOR JT/BURSA W/O US    triamcinolone acetonide (KENALOG-40) injection 40 mg    lidocaine 1 % injection 3 mL    bupivacaine (PF) (MARCAINE) 0.25 % injection 7.5 mg          Procedure:    After obtaining patient consent and under sterile conditions, L greater trochanter palpated, marked, and cleaned with alcohol pad and iodine swab. Topical ethyl chloride anesthetic used. 3ml 0.25% Marcaine, 3ml 1% Lidocaine, and 1mL Kenalog injected into greater trochanteric bursa. Bandaid was applied. Patient tolerated this well and ambulated out of clinic with minimal discomfort. Plan:  Reviewed x-rays with patient today in office   L greater trochanter bursa CSI as stated above  Post-injection care reviewed   If no relief, call the office.  If receiving pain relief from injection, but still having bursitis, we can repeat the injection in 3 mo if needed, otherwise follow up PRN    Follow up PRN    Electronically signed by Vladimir Carreon PA-C on 8/16/2022 at 10:59 AM  Note: This report was completed using computerPresent voiced recognition software. Every effort has been made to ensure accuracy; however, inadvertent computerized transcription errors may be present.

## 2022-08-25 ENCOUNTER — OFFICE VISIT (OUTPATIENT)
Dept: FAMILY MEDICINE CLINIC | Age: 82
End: 2022-08-25
Payer: MEDICARE

## 2022-08-25 VITALS
DIASTOLIC BLOOD PRESSURE: 66 MMHG | RESPIRATION RATE: 18 BRPM | SYSTOLIC BLOOD PRESSURE: 116 MMHG | BODY MASS INDEX: 21.74 KG/M2 | HEART RATE: 77 BPM | HEIGHT: 73 IN | TEMPERATURE: 96.8 F | OXYGEN SATURATION: 96 % | WEIGHT: 164 LBS

## 2022-08-25 DIAGNOSIS — I48.19 PERSISTENT ATRIAL FIBRILLATION (HCC): ICD-10-CM

## 2022-08-25 DIAGNOSIS — I50.22 CHRONIC SYSTOLIC CONGESTIVE HEART FAILURE (HCC): ICD-10-CM

## 2022-08-25 DIAGNOSIS — C61 PROSTATE CANCER (HCC): ICD-10-CM

## 2022-08-25 DIAGNOSIS — I50.22 CHRONIC SYSTOLIC CONGESTIVE HEART FAILURE (HCC): Primary | ICD-10-CM

## 2022-08-25 DIAGNOSIS — R35.0 BENIGN PROSTATIC HYPERPLASIA WITH URINARY FREQUENCY: ICD-10-CM

## 2022-08-25 DIAGNOSIS — N40.1 BENIGN PROSTATIC HYPERPLASIA WITH URINARY FREQUENCY: ICD-10-CM

## 2022-08-25 DIAGNOSIS — D69.6 THROMBOCYTOPENIA, UNSPECIFIED (HCC): ICD-10-CM

## 2022-08-25 LAB
ANION GAP SERPL CALCULATED.3IONS-SCNC: 18 MMOL/L (ref 7–16)
ANISOCYTOSIS: ABNORMAL
BASOPHILS ABSOLUTE: 0.01 E9/L (ref 0–0.2)
BASOPHILS RELATIVE PERCENT: 0.2 % (ref 0–2)
BUN BLDV-MCNC: 25 MG/DL (ref 6–23)
BURR CELLS: ABNORMAL
CALCIUM SERPL-MCNC: 9.6 MG/DL (ref 8.6–10.2)
CHLORIDE BLD-SCNC: 99 MMOL/L (ref 98–107)
CO2: 26 MMOL/L (ref 22–29)
CREAT SERPL-MCNC: 1.4 MG/DL (ref 0.7–1.2)
EOSINOPHILS ABSOLUTE: 0.04 E9/L (ref 0.05–0.5)
EOSINOPHILS RELATIVE PERCENT: 1 % (ref 0–6)
GFR AFRICAN AMERICAN: 59
GFR NON-AFRICAN AMERICAN: 49 ML/MIN/1.73
GLUCOSE BLD-MCNC: 80 MG/DL (ref 74–99)
HCT VFR BLD CALC: 38 % (ref 37–54)
HEMOGLOBIN: 11.4 G/DL (ref 12.5–16.5)
IMMATURE GRANULOCYTES #: 0.02 E9/L
IMMATURE GRANULOCYTES %: 0.5 % (ref 0–5)
LYMPHOCYTES ABSOLUTE: 0.57 E9/L (ref 1.5–4)
LYMPHOCYTES RELATIVE PERCENT: 13.8 % (ref 20–42)
MAGNESIUM: 2.3 MG/DL (ref 1.6–2.6)
MCH RBC QN AUTO: 28 PG (ref 26–35)
MCHC RBC AUTO-ENTMCNC: 30 % (ref 32–34.5)
MCV RBC AUTO: 93.4 FL (ref 80–99.9)
MONOCYTES ABSOLUTE: 0.32 E9/L (ref 0.1–0.95)
MONOCYTES RELATIVE PERCENT: 7.8 % (ref 2–12)
NEUTROPHILS ABSOLUTE: 3.16 E9/L (ref 1.8–7.3)
NEUTROPHILS RELATIVE PERCENT: 76.7 % (ref 43–80)
OVALOCYTES: ABNORMAL
PDW BLD-RTO: 18.2 FL (ref 11.5–15)
PLATELET # BLD: 137 E9/L (ref 130–450)
PMV BLD AUTO: 10.3 FL (ref 7–12)
POIKILOCYTES: ABNORMAL
POLYCHROMASIA: ABNORMAL
POTASSIUM SERPL-SCNC: 5.1 MMOL/L (ref 3.5–5)
PROSTATE SPECIFIC ANTIGEN: 0.09 NG/ML (ref 0–4)
RBC # BLD: 4.07 E12/L (ref 3.8–5.8)
SODIUM BLD-SCNC: 143 MMOL/L (ref 132–146)
WBC # BLD: 4.1 E9/L (ref 4.5–11.5)

## 2022-08-25 PROCEDURE — 99214 OFFICE O/P EST MOD 30 MIN: CPT | Performed by: FAMILY MEDICINE

## 2022-08-25 PROCEDURE — 1123F ACP DISCUSS/DSCN MKR DOCD: CPT | Performed by: FAMILY MEDICINE

## 2022-08-25 RX ORDER — TAMSULOSIN HYDROCHLORIDE 0.4 MG/1
0.4 CAPSULE ORAL DAILY
Qty: 30 CAPSULE | Refills: 5 | Status: SHIPPED | OUTPATIENT
Start: 2022-08-25

## 2022-08-25 ASSESSMENT — ENCOUNTER SYMPTOMS
TROUBLE SWALLOWING: 0
SHORTNESS OF BREATH: 1
BLOOD IN STOOL: 0
ABDOMINAL PAIN: 0
WHEEZING: 0
PHOTOPHOBIA: 0
SORE THROAT: 0
CONSTIPATION: 0
VOMITING: 0
EYE REDNESS: 0
SINUS PAIN: 0
DIARRHEA: 0
COUGH: 0
BACK PAIN: 0

## 2022-08-25 NOTE — PROGRESS NOTES
OFFICE NOTE    22  Name: Tiffanie Briones  YD   Sex:male   Age:82 y.o. SUBJECTIVE  Chief Complaint   Patient presents with    Medication Refill       HPI States he is weak but feeling better. Says eating better. CXR did not show reaccumulation of fluid and cytology from aspirated fluid was negative for malignancy    Review of Systems   Constitutional:  Positive for appetite change and fatigue. Negative for fever and unexpected weight change. HENT:  Positive for hearing loss. Negative for congestion, ear pain, sinus pain, sore throat and trouble swallowing. Eyes:  Negative for photophobia, redness and visual disturbance. Respiratory:  Positive for shortness of breath. Negative for cough and wheezing. Cardiovascular:  Negative for chest pain, palpitations and leg swelling. Gastrointestinal:  Negative for abdominal pain, blood in stool, constipation, diarrhea and vomiting. Endocrine: Negative for cold intolerance, polydipsia and polyuria. Genitourinary:  Positive for frequency and urgency. Negative for difficulty urinating, genital sores and hematuria. Musculoskeletal:  Positive for arthralgias. Negative for back pain and joint swelling. Skin:  Negative for pallor and rash. Allergic/Immunologic: Negative for food allergies. Neurological:  Positive for weakness. Negative for dizziness, tremors, seizures, syncope, numbness and headaches. Hematological:  Negative for adenopathy. Does not bruise/bleed easily. Psychiatric/Behavioral:  Negative for agitation, dysphoric mood, hallucinations and sleep disturbance. The patient is not nervous/anxious. All other systems reviewed and are negative.          Current Outpatient Medications:     apixaban (ELIQUIS) 5 MG TABS tablet, Take 1 tablet by mouth 2 times daily, Disp: 60 tablet, Rfl: 5    tamsulosin (FLOMAX) 0.4 MG capsule, Take 1 capsule by mouth daily, Disp: 30 capsule, Rfl: 5    bumetanide (BUMEX) 1 MG tablet, Take 1 mg by mouth in the morning and 1 mg before bedtime. , Disp: , Rfl:     acetaminophen (TYLENOL) 325 MG tablet, Take 650 mg by mouth every 6 hours as needed for Pain, Disp: , Rfl:     ursodiol (ACTIGALL) 300 MG capsule, Take 1 capsule by mouth in the morning and 1 capsule before bedtime. , Disp: 180 capsule, Rfl: 1    potassium bicarbonate (EFFER-K) 25 MEQ disintegrating tablet, Take 1 tablet by mouth in the morning., Disp: 30 tablet, Rfl: 5    Cholecalciferol (VITAMIN D) 50 MCG (2000 UT) CAPS capsule, Take by mouth, Disp: , Rfl:     Zinc Sulfate (ZINC 15 PO), Take by mouth, Disp: , Rfl:     Handicap Placard MISC, by Does not apply route 5 years.  OA as diagnosis, Disp: 2 each, Rfl: 0    Multiple Vitamins-Minerals (THERAPEUTIC MULTIVITAMIN-MINERALS) tablet, Take 1 tablet by mouth daily, Disp: , Rfl:     leuprolide acetate, 6 Month, (ELIGARD) 45 MG injection, Inject 45 mg into the skin once One injection every 6 months (Patient not taking: No sig reported), Disp: , Rfl:   Allergies   Allergen Reactions    Flecainide Dermatitis    Zoloft [Sertraline]        Past Medical History:   Diagnosis Date    Arthritis     Knees    Atrial fibrillation (HCC)     Cancer (HCC)     skin cancer; Prostate cancer     CHF (congestive heart failure) (HCC)     Chronic obstructive pulmonary disease ([facility] [address] 7/20/2022    Gallstone pancreatitis     H/O esophagogastroduodenoscopy 02/2013    dr Prince Tena no Hpylori, or Howell's     H/O ETOH abuse     sober for several years    Hypertension     MDRO (multiple drug resistant organisms) resistance     Pacemaker     REMOVED 2018 due to infection     Past Surgical History:   Procedure Laterality Date    ABDOMEN SURGERY      CATARACT REMOVAL Bilateral     CHOLECYSTECTOMY      COLONOSCOPY  06/11/2016    additional dates: 2003,02/28/2013    ENDOSCOPY, COLON, DIAGNOSTIC      ERCP      and ston removal 03/2007    EYE SURGERY Bilateral     cataracts    HIP SURGERY Left 4/24/2020    HIP HEMIARTHROPLASTY -- [employer] performed by Opal Diaz DO at [address] HISTORY      cardioversion    PACEMAKER PLACEMENT      LASER LEAD EXTRACTION  03/02/2018    SKIN BIOPSY      TONSILLECTOMY       Family History   Problem Relation Age of Onset    Hypertension Father     Heart Disease Father     Emphysema Father     Coronary Art Dis Mother     Osteoporosis Mother      Social History       Tobacco History       Smoking Status  Never      Smokeless Tobacco Use  Never              Alcohol History       Alcohol Use Status  No              Drug Use       Drug Use Status  No              Sexual Activity       Sexually Active  Not Currently Partners  Female                    OBJECTIVE  Vitals:    08/25/22 0912   BP: 116/66   Pulse: 77   Resp: 18   Temp: 96.8 °F (36 °C)   TempSrc: Temporal   SpO2: 96%   Weight: 164 lb (74.4 kg)   Height: 6' 1\" (1.854 m)        Body mass index is 21.64 kg/m². Orders Placed This Encounter   Procedures    CBC with Auto Differential     Standing Status:   Future     Number of Occurrences:   1     Standing Expiration Date:   1/77/4712    Basic Metabolic Panel     Standing Status:   Future     Number of Occurrences:   1     Standing Expiration Date:   8/25/2023    Magnesium     Standing Status:   Future     Number of Occurrences:   1     Standing Expiration Date:   8/25/2023    PSA, Diagnostic     Standing Status:   Future     Number of Occurrences:   1     Standing Expiration Date:   8/25/2023        EXAM   Physical Exam  Vitals and nursing note reviewed. Constitutional:       Appearance: Normal appearance. He is normal weight. HENT:      Right Ear: Tympanic membrane normal.      Left Ear: Tympanic membrane normal.      Nose: No congestion. Mouth/Throat:      Pharynx: Oropharynx is clear. No posterior oropharyngeal erythema. Eyes:      Conjunctiva/sclera: Conjunctivae normal.      Pupils: Pupils are equal, round, and reactive to light.    Cardiovascular:      Rate and Rhythm: Normal rate. Rhythm irregular. Heart sounds: No murmur heard. Pulmonary:      Effort: Pulmonary effort is normal.      Breath sounds: No wheezing, rhonchi or rales. Abdominal:      General: Bowel sounds are normal.      Palpations: There is no mass. Tenderness: There is no abdominal tenderness. Musculoskeletal:         General: No swelling or tenderness. Cervical back: No tenderness. Right lower leg: No edema. Left lower leg: No edema. Lymphadenopathy:      Cervical: No cervical adenopathy. Skin:     Coloration: Skin is not jaundiced. Findings: No bruising or rash. Neurological:      General: No focal deficit present. Mental Status: He is alert and oriented to person, place, and time. Sensory: No sensory deficit. Motor: No weakness. Coordination: Coordination normal.      Gait: Gait normal.   Psychiatric:         Mood and Affect: Mood normal.         Behavior: Behavior normal.         Sam was seen today for medication refill. Diagnoses and all orders for this visit:    Chronic systolic congestive heart failure ([address]  -     CBC with Auto Differential; Future  -     Basic Metabolic Panel; Future  -     Magnesium; Future  Seems to be well compensated. Believe loop diuretic might be able to be reduced slightly. Seeing Dr. Justen Saldivar in less than a week. Seems stronger and less depressed today  Persistent atrial fibrillation (HCC)  -     apixaban (ELIQUIS) 5 MG TABS tablet; Take 1 tablet by mouth 2 times daily    Benign prostatic hyperplasia with urinary frequency  -     tamsulosin (FLOMAX) 0.4 MG capsule; Take 1 capsule by mouth daily    Prostate cancer (HCC)  -     PSA, Diagnostic; Future    Thrombocytopenia, unspecified    Very mild 124 K. No changes made    No follow-ups on file.     Electronically signed by Mindy Garza MD on 8/25/22 at 10:03 AM EDT

## 2022-09-08 ENCOUNTER — TELEPHONE (OUTPATIENT)
Dept: FAMILY MEDICINE CLINIC | Age: 82
End: 2022-09-08

## 2022-09-08 NOTE — TELEPHONE ENCOUNTER
----- Message from Luisaline Boxer sent at 9/8/2022  3:04 PM EDT -----  Subject: Appointment Request    Reason for Call: Established Patient Appointment needed: Routine ED Follow   Up Visit    QUESTIONS    Reason for appointment request? Available appointments did not meet   patient need     Additional Information for Provider? Pt was seen in ED on 9/8/22 and needs   to be seen in 5-7 days.  Can you please assist him with scheduling appt due   to lack of availability that meets timeline?  ---------------------------------------------------------------------------  --------------  [email or web address]  [phone]; OK to leave message on voicemail  ---------------------------------------------------------------------------  --------------  SCRIPT ANSWERS  COVID Screen: [place]

## 2022-09-12 ENCOUNTER — OFFICE VISIT (OUTPATIENT)
Dept: FAMILY MEDICINE CLINIC | Age: 82
End: 2022-09-12
Payer: MEDICARE

## 2022-09-12 VITALS
WEIGHT: 166 LBS | HEART RATE: 72 BPM | HEIGHT: 73 IN | TEMPERATURE: 97.5 F | OXYGEN SATURATION: 100 % | SYSTOLIC BLOOD PRESSURE: 120 MMHG | BODY MASS INDEX: 22 KG/M2 | RESPIRATION RATE: 16 BRPM | DIASTOLIC BLOOD PRESSURE: 70 MMHG

## 2022-09-12 DIAGNOSIS — D69.6 THROMBOCYTOPENIA, UNSPECIFIED (HCC): ICD-10-CM

## 2022-09-12 DIAGNOSIS — K74.5 BILIARY CIRRHOSIS (HCC): ICD-10-CM

## 2022-09-12 DIAGNOSIS — N18.31 STAGE 3A CHRONIC KIDNEY DISEASE (HCC): ICD-10-CM

## 2022-09-12 PROBLEM — N18.30 CHRONIC RENAL DISEASE, STAGE III (HCC): Status: ACTIVE | Noted: 2022-09-12

## 2022-09-12 LAB
INTERNATIONAL NORMALIZATION RATIO, POC: 1.3
PROTHROMBIN TIME, POC: 15.9

## 2022-09-12 PROCEDURE — 85610 PROTHROMBIN TIME: CPT | Performed by: FAMILY MEDICINE

## 2022-09-12 PROCEDURE — 99214 OFFICE O/P EST MOD 30 MIN: CPT | Performed by: FAMILY MEDICINE

## 2022-09-12 PROCEDURE — 1123F ACP DISCUSS/DSCN MKR DOCD: CPT | Performed by: FAMILY MEDICINE

## 2022-09-12 NOTE — PROGRESS NOTES
OFFICE NOTE    9/12/22  Name: Marcia Harrison  YTP:7[birth year]18[birth year]   Sex:male   Age:82 y.o. SUBJECTIVE  Chief Complaint   Patient presents with    Follow-Up from Hospital     Pt was seen at [facility] on 09/08 for hypoxia and acute CHF       HPI feels weak but is considerably improved. Has RIH feels weight of fluid in abdomen seems to aggravate this    Review of Systems   Believes he was put on some type of tablet by Dr. Jermaine Quintana for his prostate cancer as he is intolerant of Lupron  Denies jaundice anorexia. Does not drink alcohol any more    Current Outpatient Medications:     apixaban (ELIQUIS) 5 MG TABS tablet, Take 1 tablet by mouth 2 times daily, Disp: 60 tablet, Rfl: 5    tamsulosin (FLOMAX) 0.4 MG capsule, Take 1 capsule by mouth daily, Disp: 30 capsule, Rfl: 5    bumetanide (BUMEX) 1 MG tablet, Take 1 mg by mouth in the morning and 1 mg before bedtime. , Disp: , Rfl:     acetaminophen (TYLENOL) 325 MG tablet, Take 650 mg by mouth every 6 hours as needed for Pain, Disp: , Rfl:     ursodiol (ACTIGALL) 300 MG capsule, Take 1 capsule by mouth in the morning and 1 capsule before bedtime. , Disp: 180 capsule, Rfl: 1    potassium bicarbonate (EFFER-K) 25 MEQ disintegrating tablet, Take 1 tablet by mouth in the morning., Disp: 30 tablet, Rfl: 5    Cholecalciferol (VITAMIN D) 50 MCG (2000 UT) CAPS capsule, Take by mouth, Disp: , Rfl:     Zinc Sulfate (ZINC 15 PO), Take by mouth, Disp: , Rfl:     Handicap Placard MISC, by Does not apply route 5 years.  OA as diagnosis, Disp: 2 each, Rfl: 0    Multiple Vitamins-Minerals (THERAPEUTIC MULTIVITAMIN-MINERALS) tablet, Take 1 tablet by mouth daily, Disp: , Rfl:     leuprolide acetate, 6 Month, (ELIGARD) 45 MG injection, Inject 45 mg into the skin once One injection every 6 months (Patient not taking: No sig reported), Disp: , Rfl:   Allergies   Allergen Reactions    Flecainide Dermatitis    Zoloft [Sertraline]        Past Medical History:   Diagnosis Date    Arthritis     Knees Atrial fibrillation ([facility]     Cancer ([facility]     skin cancer; Prostate cancer     CHF (congestive heart failure) (HCC)     Chronic obstructive pulmonary disease ([facility] 7/20/2022    Gallstone pancreatitis     H/O esophagogastroduodenoscopy 02/2013    dr Mino Lovelace no Hpylori, or Howell's     H/O ETOH abuse     sober for several years    Hypertension     MDRO (multiple drug resistant organisms) resistance     Pacemaker     REMOVED 2018 due to infection     Past Surgical History:   Procedure Laterality Date    ABDOMEN SURGERY      CATARACT REMOVAL Bilateral     CHOLECYSTECTOMY      COLONOSCOPY  06/11/2016    additional dates: 2003,02/28/2013    ENDOSCOPY, COLON, DIAGNOSTIC      ERCP      and ston removal 03/2007    EYE SURGERY Bilateral     cataracts    HIP SURGERY Left 4/24/2020    HIP HEMIARTHROPLASTY -- HILARY performed by Reina Sarabia DO at [facility]      cardioversion    PACEMAKER PLACEMENT      LASER LEAD EXTRACTION  03/02/2018    SKIN BIOPSY      TONSILLECTOMY       Family History   Problem Relation Age of Onset    Hypertension Father     Heart Disease Father     Emphysema Father     Coronary Art Dis Mother     Osteoporosis Mother      Social History       Tobacco History       Smoking Status  Never      Smokeless Tobacco Use  Never              Alcohol History       Alcohol Use Status  No              Drug Use       Drug Use Status  No              Sexual Activity       Sexually Active  Not Currently Partners  Female                    OBJECTIVE  Vitals:    09/12/22 1517   BP: 120/70   Pulse: 72   Resp: 16   Temp: 97.5 °F (36.4 °C)   TempSrc: Temporal   SpO2: 100%   Weight: 166 lb (75.3 kg)   Height: 6' 1\" (1.854 m)        Body mass index is 21.9 kg/m².     Orders Placed This Encounter   Procedures    Alf Mejias MD, Gastroenterology, [place]     Referral Priority:   Routine     Referral Type:   Eval and Treat     Referral Reason:   Specialty Services Required Referred to Provider:   Nico Cortez MD     Requested Specialty:   Gastroenterology     Number of Visits Requested:   1    POCT INR        EXAM   Physical Exam  Vitals and nursing note reviewed. Constitutional:       Appearance: He is normal weight. He is ill-appearing. HENT:      Right Ear: Tympanic membrane normal.      Left Ear: Tympanic membrane normal.      Nose: No congestion. Mouth/Throat:      Pharynx: Oropharynx is clear. No posterior oropharyngeal erythema. Eyes:      Conjunctiva/sclera: Conjunctivae normal.   Cardiovascular:      Rate and Rhythm: Normal rate and regular rhythm. Heart sounds: No murmur heard. Pulmonary:      Effort: Pulmonary effort is normal.      Breath sounds: Normal breath sounds. Abdominal:      General: Bowel sounds are normal.      Palpations: There is no mass. Tenderness: There is no abdominal tenderness. Comments: May have some ascites but is minimal   Musculoskeletal:         General: No swelling or tenderness. Cervical back: No tenderness. Right lower leg: No edema. Left lower leg: No edema. Lymphadenopathy:      Cervical: No cervical adenopathy. Skin:     Coloration: Skin is not jaundiced or pale. Findings: Bruising present. No rash. Neurological:      General: No focal deficit present. Mental Status: He is alert and oriented to person, place, and time. Comments: Negative asterixis   Psychiatric:         Mood and Affect: Mood normal.         Behavior: Behavior normal.         Sam was seen today for follow-up from hospital.    Diagnoses and all orders for this visit:    Biliary cirrhosis [facility])  -     Elsie Wong MD, Gastroenterology, [facility]  -     POCT INR  Feel this may contributing to his edema and pulmonary effusion.  Will refer for opitimization of meds and management stategy  Stage 3a chronic kidney disease ([facility] [address]  Seems stable not inclined to use NSSAIDs  Thrombocytopenia, unspecified ([facility] [address] [address]  Mild, along with elevation of LFT's supports diagnosis of cirrhosis  INR  1.3 mildly abnormal    No follow-ups on file.     Electronically signed by Telma Nur MD on 9/12/22 at 3:15 PM EDT

## 2022-09-13 ENCOUNTER — TELEPHONE (OUTPATIENT)
Dept: FAMILY MEDICINE CLINIC | Age: 82
End: 2022-09-13

## 2022-09-27 ENCOUNTER — INITIAL CONSULT (OUTPATIENT)
Dept: GASTROENTEROLOGY | Age: 82
End: 2022-09-27
Payer: MEDICARE

## 2022-09-27 VITALS
SYSTOLIC BLOOD PRESSURE: 138 MMHG | RESPIRATION RATE: 16 BRPM | TEMPERATURE: 97 F | DIASTOLIC BLOOD PRESSURE: 76 MMHG | OXYGEN SATURATION: 99 % | HEART RATE: 50 BPM | BODY MASS INDEX: 23.06 KG/M2 | HEIGHT: 73 IN | WEIGHT: 174 LBS

## 2022-09-27 DIAGNOSIS — R10.11 RIGHT UPPER QUADRANT ABDOMINAL PAIN: ICD-10-CM

## 2022-09-27 DIAGNOSIS — K74.5 BILIARY CIRRHOSIS (HCC): Primary | ICD-10-CM

## 2022-09-27 PROCEDURE — 99202 OFFICE O/P NEW SF 15 MIN: CPT | Performed by: NURSE PRACTITIONER

## 2022-09-27 PROCEDURE — 1123F ACP DISCUSS/DSCN MKR DOCD: CPT | Performed by: NURSE PRACTITIONER

## 2022-09-27 NOTE — PROGRESS NOTES
Leah Carbone (:  1940) is a 80 y.o. male, here for evaluation of the following chief complaint(s):  New Patient (Referred by Dr Jerica Wagner for K74.5 (ICD-10-CM) - Biliary cirrhosis ([facility] [address]))      SUBJECTIVE/OBJECTIVE:  HPI:    Dell Reece is a very pleasant 80year old gentleman that presents today for PBC. Patient was recently admitted into [facility] for CHF  Patient was taken to the ER for SOB    CT scan showed    CT Abdomen and Pelvis with contrast:      1. Mild congestive heart failure. 2.  Liver is enlarged 17 cm in length with enlarged left lobe and recannulated umbilical vein. Spleen is markedly enlarged measuring 22 cm in length. Varices at the gastroesophageal junction      most likely related to cirrhosis. Findings are worse than 2018. 3.  Small amount of ascites with fluid extending into the right inguinal canal accounting for 5 cm      palpable lump. 4.  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 If the patient has hypertension, correlate for potential renal artery stenosis. 5.  Diverticulosis sigmoid colon with no surrounding inflammation. 6.  5 cm diverticulum in the right side of bladder. Patient has had multiple ERCP's by Dr. [place] over the years for stones  On Ursodiol daily   Cholecystectomy in   Patients wife tells me today that his abdomen is distended  Patient was placed on Bumex while in the hospital      There is a history of excessive beer drinking     ROS:  General: Patient denies n/v/f/c or weight loss. HEENT: Patient denies persistent postnasal drip, scleral icterus, drooling, persistent bleeding from nose/mouth. Resp: Patient denies SOB, wheezing, productive cough. Cards: Patient denies CP, palpitations, significant edema  GI: As above. Derm: Patient denies jaundice/rashes.    Musc: Patient denies diffuse/irregular joint swelling or myalgias. Objective   Wt Readings from Last 3 Encounters:   10/05/22 170 lb 9.6 oz (77.4 kg)   09/27/22 174 lb (78.9 kg)   09/12/22 166 lb (75.3 kg)     Temp Readings from Last 3 Encounters:   10/05/22 98.9 °F (37.2 °C) (Infrared)   09/27/22 97 °F (36.1 °C) (Temporal)   09/12/22 97.5 °F (36.4 °C) (Temporal)     BP Readings from Last 3 Encounters:   10/05/22 98/62   09/27/22 138/76   09/12/22 120/70     Pulse Readings from Last 3 Encounters:   10/05/22 60   09/27/22 50   09/12/22 72        Physical Exam  Abdominal:      General: Bowel sounds are normal. There is distension.        Past Medical History:   Diagnosis Date    Arthritis     Knees    Atrial fibrillation ([address]     Cancer (HCC)     skin cancer; Prostate cancer     CHF (congestive heart failure) ([address]     Chronic obstructive pulmonary disease ([facility] [address] 7/20/2022    Gallstone pancreatitis     H/O esophagogastroduodenoscopy 02/2013    dr Emma Campo no Hpylori, or Howell's     H/O ETOH abuse     sober for several years    Hypertension     MDRO (multiple drug resistant organisms) resistance     Pacemaker     REMOVED 2018 due to infection      Past Surgical History:   Procedure Laterality Date    [facility] Bilateral     CHOLECYSTECTOMY      COLONOSCOPY  06/11/2016    additional dates: 2003,02/28/2013    ENDOSCOPY, COLON, DIAGNOSTIC      ERCP      and ston removal 03/2007    EYE SURGERY Bilateral     cataracts    HIP SURGERY Left 04/24/2020    HIP HEMIARTHROPLASTY -- HILARY performed by Alexsandra Rucker DO at [address]      cardioversion    PACEMAKER PLACEMENT      LASER LEAD EXTRACTION  03/02/2018    SKIN BIOPSY      TONSILLECTOMY        Family History   Problem Relation Age of Onset    Coronary Art Dis Mother     Osteoporosis Mother     Hypertension Father     Heart Disease Father     Emphysema Father         Lab Results   Component Value Date    WBC 2.8 (L) 09/29/2022    HGB 11.2 (L) 09/29/2022    HCT 36.6 (L) 09/29/2022    MCV 92.7 09/29/2022     09/29/2022      Lab Results   Component Value Date     09/08/2022    K 4.3 09/08/2022    CL 97 (L) 09/08/2022    CO2 29 09/08/2022    BUN 24 (H) 09/08/2022    CREATININE 1.2 09/08/2022    GLUCOSE 106 (H) 09/08/2022    CALCIUM 8.6 09/08/2022    PROT 7.2 09/29/2022    LABALBU 3.6 09/29/2022    BILITOT 1.4 (H) 09/29/2022    ALKPHOS 368 (H) 09/29/2022    AST 33 09/29/2022    ALT 24 09/29/2022    LABGLOM 58 09/08/2022    GFRAA 59 08/25/2022    AGRATIO 0.8 (L) 09/08/2022    GLOB 4.0 (H) 09/08/2022                       ASSESSMENT/PLAN:    1. Biliary cirrhosis (HCC)  -     US ABDOMEN COMPLETE; Future  2. Right upper quadrant abdominal pain    -ultrasound ordered to determine amount of ascites  -will await patients past medical records to review before proceeding with further testing    Return for Follow up. An electronic signature was used to authenticate this note.     --JOE Manzo - CNP

## 2022-09-28 DIAGNOSIS — K74.5 BILIARY CIRRHOSIS (HCC): Primary | ICD-10-CM

## 2022-09-29 DIAGNOSIS — K74.5 BILIARY CIRRHOSIS (HCC): ICD-10-CM

## 2022-09-29 LAB
ALBUMIN SERPL-MCNC: 3.6 G/DL (ref 3.5–5.2)
ALP BLD-CCNC: 368 U/L (ref 40–129)
ALT SERPL-CCNC: 24 U/L (ref 0–40)
AST SERPL-CCNC: 33 U/L (ref 0–39)
BASOPHILS ABSOLUTE: 0.05 E9/L (ref 0–0.2)
BASOPHILS RELATIVE PERCENT: 1.8 % (ref 0–2)
BILIRUB SERPL-MCNC: 1.4 MG/DL (ref 0–1.2)
BILIRUBIN DIRECT: 0.6 MG/DL (ref 0–0.3)
BILIRUBIN, INDIRECT: 0.8 MG/DL (ref 0–1)
EOSINOPHILS ABSOLUTE: 0 E9/L (ref 0.05–0.5)
EOSINOPHILS RELATIVE PERCENT: 1.4 % (ref 0–6)
GAMMA GLUTAMYL TRANSFERASE: 99 U/L (ref 10–71)
HCT VFR BLD CALC: 36.6 % (ref 37–54)
HEMOGLOBIN: 11.2 G/DL (ref 12.5–16.5)
INR BLD: 1.4
IRON SATURATION: 26 % (ref 20–55)
IRON: 63 MCG/DL (ref 59–158)
LYMPHOCYTES ABSOLUTE: 0.28 E9/L (ref 1.5–4)
LYMPHOCYTES RELATIVE PERCENT: 10.4 % (ref 20–42)
MCH RBC QN AUTO: 28.4 PG (ref 26–35)
MCHC RBC AUTO-ENTMCNC: 30.6 % (ref 32–34.5)
MCV RBC AUTO: 92.7 FL (ref 80–99.9)
MONOCYTES ABSOLUTE: 0.08 E9/L (ref 0.1–0.95)
MONOCYTES RELATIVE PERCENT: 2.6 % (ref 2–12)
NEUTROPHILS ABSOLUTE: 2.38 E9/L (ref 1.8–7.3)
NEUTROPHILS RELATIVE PERCENT: 85.2 % (ref 43–80)
OVALOCYTES: ABNORMAL
PDW BLD-RTO: 16.6 FL (ref 11.5–15)
PLATELET # BLD: 135 E9/L (ref 130–450)
PMV BLD AUTO: 10.5 FL (ref 7–12)
POIKILOCYTES: ABNORMAL
PROTHROMBIN TIME: 15.2 SEC (ref 9.3–12.4)
RBC # BLD: 3.95 E12/L (ref 3.8–5.8)
TOTAL IRON BINDING CAPACITY: 245 MCG/DL (ref 250–450)
TOTAL PROTEIN: 7.2 G/DL (ref 6.4–8.3)
WBC # BLD: 2.8 E9/L (ref 4.5–11.5)

## 2022-09-30 LAB
ANTI-MITOCHONDRIAL AB, IFA: NEGATIVE
CERULOPLASMIN: 44 MG/DL (ref 15–30)
HBV SURFACE AB TITR SER: NORMAL {TITER}
HEPATITIS B CORE TOTAL ANTIBODY: NONREACTIVE
HEPATITIS B SURFACE ANTIGEN INTERPRETATION: NORMAL
HEPATITIS C ANTIBODY INTERPRETATION: NORMAL
SMOOTH MUSCLE ANTIBODY: NEGATIVE

## 2022-10-01 LAB — HAV AB SERPL IA-ACNC: POSITIVE

## 2022-10-02 DIAGNOSIS — I10 ESSENTIAL HYPERTENSION: ICD-10-CM

## 2022-10-03 RX ORDER — AMLODIPINE BESYLATE AND BENAZEPRIL HYDROCHLORIDE 10; 20 MG/1; MG/1
CAPSULE ORAL
Qty: 90 CAPSULE | Refills: 0 | Status: SHIPPED | OUTPATIENT
Start: 2022-10-03

## 2022-10-05 ENCOUNTER — PREP FOR PROCEDURE (OUTPATIENT)
Dept: GASTROENTEROLOGY | Age: 82
End: 2022-10-05

## 2022-10-05 ENCOUNTER — TELEPHONE (OUTPATIENT)
Dept: GASTROENTEROLOGY | Age: 82
End: 2022-10-05

## 2022-10-05 ENCOUNTER — OFFICE VISIT (OUTPATIENT)
Dept: GASTROENTEROLOGY | Age: 82
End: 2022-10-05
Payer: MEDICARE

## 2022-10-05 VITALS
TEMPERATURE: 98.9 F | HEIGHT: 73 IN | DIASTOLIC BLOOD PRESSURE: 62 MMHG | HEART RATE: 60 BPM | SYSTOLIC BLOOD PRESSURE: 98 MMHG | BODY MASS INDEX: 22.61 KG/M2 | OXYGEN SATURATION: 95 % | RESPIRATION RATE: 18 BRPM | WEIGHT: 170.6 LBS

## 2022-10-05 DIAGNOSIS — K74.69 OTHER CIRRHOSIS OF LIVER (HCC): Primary | ICD-10-CM

## 2022-10-05 PROBLEM — I85.00 ESOPHAGEAL VARICES (HCC): Status: ACTIVE | Noted: 2022-10-05

## 2022-10-05 PROCEDURE — 99213 OFFICE O/P EST LOW 20 MIN: CPT | Performed by: STUDENT IN AN ORGANIZED HEALTH CARE EDUCATION/TRAINING PROGRAM

## 2022-10-05 PROCEDURE — 1123F ACP DISCUSS/DSCN MKR DOCD: CPT | Performed by: STUDENT IN AN ORGANIZED HEALTH CARE EDUCATION/TRAINING PROGRAM

## 2022-10-05 NOTE — PROGRESS NOTES
[facility] ([facility])  Gastroenterology, Hepatology &  Advanced Endoscopy     Progress Note    SUBJECTIVE:      Mr. Zandra Hall is an 82y/M who presents to clinic in follow-up for cirrhosis. He was seen in clinic in late Sept after being admitted at [facility] for a CHF exacerbation and SOB. CT during that admission showed an enlarged liver with recannulated umbilical vein, splenomegaly, and esophageal varices. The patient has  history of multiple ERCPs performed at [facility] for residual stones which continued after [facility]. He has a history of prostate cancer and is currently on treatment for it. He is currently on therapy with ursodiol and is on Bumex for a diuretic. Workup for underlying causes of liver disease was negative. Cirrhosis:  Etiology: SHEARER vs Cardiac/Congestive Hepatopathy  MELD: 16  HE: No history. EV: On imaging. No recent EGD. Ascites: On Bumex but with abdominal distension. HCC: No focal lesions on reviewed imaging. OBJECTIVE      Physical    VITALS:  BP 98/62 (Site: Left Upper Arm, Position: Sitting, Cuff Size: Medium Adult)   Pulse 60   Temp 98.9 °F (37.2 °C) (Infrared)   Resp 18   Ht 6' 1\" (1.854 m)   Wt 170 lb 9.6 oz (77.4 kg)   SpO2 95%   BMI 22.51 kg/m²   Physical Exam:  General: Overall well-appearing, NAD  HEENT: PERRLA, EOMI, Anicteric sclera, MMM, no rhinorrhea  Cards: RRR, no LE edema  Resp: Breathing comfortably on room air, good air movement, no use of accessory muscles, no audible wheezing  Abdomen: soft, NT. Distended with appreciable ascites. Extremities: Moves all extremities, no effusions or bruising. Skin: No rashes or jaundice  Neuro: A&O x 3, CN grossly intact, non-focal exam     ASSESSMENT AND PLAN      82y/M w/ history of cirrhosis and portal hypertension likely secondary to SHEARER though could also be cardiogenic given his history of heart failure.      PLAN:  Cirrhosis:  - Will obtain US Elastography to confirm diagnosis of cirrhosis  - Will obtain CBC/CMP/INR q 6 months  - Will obtain Liver US q 6 months for [facility] screening  - I will schedule the patient for an EGD to be performed for variceal screening.  - The patient is currently on Bumex. - We will order IR guided paracentesis with fluid studies and albumin replacement. - I will add Spironolactone 50mg to his diuretic regimen. I will see the patient back in clinic in 4 weeks. Thank you for including us in the care of this patient. Please do not hesitate to contact us with any additional questions or concerns.     Destiny Epps MD  Gastroenterology/Hepatology  Advanced Endoscopy

## 2022-10-05 NOTE — TELEPHONE ENCOUNTER
Prior Authorization Form:      DEMOGRAPHICS:                     Patient Name:  Daphney Kayser  Patient :  1940            Insurance:  Payor: rEika Barry / Plan: Freddy Hummel PPO / Product Type: Medicare /   Insurance ID Number:    Payer/Plan Subscr  Sex Relation Sub. Ins. ID Effective Group Num   1.  Erika Valladares 1940 Male Self [id number] 22 [id number] [id number]         DIAGNOSIS & PROCEDURE:                       Procedure/Operation: EGD           CPT Code: 58647    Diagnosis:  Esophageal varices screening    ICD10 Code: i85.00    Location:  [address] [place] [address]    Surgeon:  Dr. Jie Worrell     SCHEDULING INFORMATION:                          Date: 2023    Time: 1145              Anesthesia:  MAC/TIVA                                                       Status:  Outpatient            Electronically signed by Lucille Padron MA on 10/5/2022 at 3:25 PM

## 2022-10-19 RX ORDER — ALBUMIN (HUMAN) 12.5 G/50ML
50 SOLUTION INTRAVENOUS ONCE
Qty: 200 ML | Refills: 0 | Status: SHIPPED | OUTPATIENT
Start: 2022-10-19 | End: 2022-10-19

## 2022-10-19 RX ORDER — SPIRONOLACTONE 100 MG/1
50 TABLET, FILM COATED ORAL DAILY
Qty: 30 TABLET | Refills: 5 | Status: SHIPPED | OUTPATIENT
Start: 2022-10-19

## 2022-10-26 ENCOUNTER — TELEPHONE (OUTPATIENT)
Dept: ULTRASOUND IMAGING | Age: 82
End: 2022-10-26

## 2022-10-26 ENCOUNTER — TELEPHONE (OUTPATIENT)
Dept: GASTROENTEROLOGY | Age: 82
End: 2022-10-26

## 2022-10-26 DIAGNOSIS — K74.5 BILIARY CIRRHOSIS (HCC): Primary | ICD-10-CM

## 2022-10-26 NOTE — TELEPHONE ENCOUNTER
Received a fax from IR Dr. Jonel Mcburney regarding paracentesis for Sam    There is not enough fluid to have paracentesis. Patients wife, June, notified. Will discuss with Dr. Lacey Farias regarding further recommendations for patients bloating.

## 2022-10-28 RX ORDER — LACTULOSE 10 G/15ML
10 SOLUTION ORAL 3 TIMES DAILY
Qty: 1350 ML | Refills: 3 | Status: SHIPPED | OUTPATIENT
Start: 2022-10-28

## 2022-11-09 ENCOUNTER — OFFICE VISIT (OUTPATIENT)
Dept: GASTROENTEROLOGY | Age: 82
End: 2022-11-09
Payer: MEDICARE

## 2022-11-09 VITALS — HEIGHT: 73 IN | RESPIRATION RATE: 18 BRPM | BODY MASS INDEX: 21.05 KG/M2 | TEMPERATURE: 97.5 F | WEIGHT: 158.8 LBS

## 2022-11-09 DIAGNOSIS — R18.8 OTHER ASCITES: ICD-10-CM

## 2022-11-09 DIAGNOSIS — K74.60 ESOPHAGEAL VARICES IN CIRRHOSIS (HCC): ICD-10-CM

## 2022-11-09 DIAGNOSIS — I85.10 ESOPHAGEAL VARICES IN CIRRHOSIS (HCC): ICD-10-CM

## 2022-11-09 DIAGNOSIS — K74.69 OTHER CIRRHOSIS OF LIVER (HCC): Primary | ICD-10-CM

## 2022-11-09 DIAGNOSIS — K76.82 HEPATIC ENCEPHALOPATHY: ICD-10-CM

## 2022-11-09 PROCEDURE — 1123F ACP DISCUSS/DSCN MKR DOCD: CPT | Performed by: STUDENT IN AN ORGANIZED HEALTH CARE EDUCATION/TRAINING PROGRAM

## 2022-11-09 PROCEDURE — 99213 OFFICE O/P EST LOW 20 MIN: CPT | Performed by: STUDENT IN AN ORGANIZED HEALTH CARE EDUCATION/TRAINING PROGRAM

## 2022-11-09 NOTE — PROGRESS NOTES
[facility] ([facility])  Gastroenterology, Hepatology &  Advanced Endoscopy     Progress Note    SUBJECTIVE:      Mr. Salena Bowman is an 82y/M who presents to clinic in follow-up for continued evaluation of liver disease. Patient had an [address] performed after his last visit which showed F3 fibrosis. However, other imaging studies show changes consistent with portal HTN including ascites, recannulated umbilical vein, and varices. Cirrhosis:  Etiology: SHEARER vs Cardiac  MELD: 16  HE: On lactulose  EV: EGD scheduled  Ascites: Seemingly controlled on Bumex and Spironolactone. [address] Screen: CT Sept 2022 with no focal lesions    OBJECTIVE      Physical    VITALS:  Temp 97.5 °F (36.4 °C) (Infrared)   Resp 18   Ht 6' 1\" (1.854 m)   Wt 158 lb 12.8 oz (72 kg)   BMI 20.95 kg/m²   Physical Exam:  General: Overall well-appearing, NAD  HEENT: PERRLA, EOMI, Anicteric sclera, MMM, no rhinorrhea  Cards: RRR, no LE edema  Resp: Breathing comfortably on room air, good air movement, no use of accessory muscles, no audible wheezing  Abdomen: soft, NT, ND. Hernias present. Extremities: Moves all extremities, no effusions or bruising. Skin: No rashes or jaundice  Neuro: A&O x 3, CN grossly intact, non-focal exam       ASSESSMENT AND PLAN      82y/M who presents to clinic in follow-up for cirrhosis. PLAN:  Cirrhosis:  - CBC, CMP, and INR q 6 months  - Liver US for [facility] [address] screen q 6 months  - Okay to continue ursodiol. 2. HE:  - Continue lactulose    3. EV:  - EGD scheduled for Feb 4. Ascites:  - Continue Bumex/Spironolactone. Will titrate to BP and Cr. 5. Abdominal Wall Hernias:  - Consider abdominal binder. I will see the patient back in clinic following his EGD. Thank you for including us in the care of this patient. Please do not hesitate to contact us with any additional questions or concerns.     Fabiola Barksdale MD  Gastroenterology/Hepatology  Advanced Endoscopy

## 2022-11-15 ENCOUNTER — TELEPHONE (OUTPATIENT)
Dept: PHARMACY | Facility: CLINIC | Age: 82
End: 2022-11-15

## 2022-11-15 NOTE — TELEPHONE ENCOUNTER
[facility]: ADHERENCE REVIEW  Identified care gap per [employer]: fills at [address]: ACE/ARB adherence    Last Visit: 11/9/22 Gastro      Patient not found in [facility]    [address] Records claims through 11/5/22 (Prior Year [place] = PASSED; YTD Jaime Sosa =  83%; Potential Fail Date: 11/25/22 ):   AMLOD/BENAZP CAP 10-20MG last filled on 6/28/22 for 90 day supply. Next refill due: 10/2/22    Per  [facility]:  AMLOD/BENAZP CAP 10-20MG   -90 10/3/22    Per [facility] DR NATALIE ALLEN:   AMLOD/BENAZP CAP 10-20MG last picked up on 6/28/22 for 90 day supply. Per pharmacy, they stated that they received script on 10/3/22 but their records show that it was deactivated due to therapy completion    BP Readings from Last 3 Encounters:   10/05/22 98/62   09/27/22 138/76   09/12/22 120/70     Estimated Creatinine Clearance: 48 mL/min (based on SCr of 1.2 mg/dL). PLAN  The following are interventions that have been identified:     Per Chart Review (media tab) office visit from [facility] cardiology on 8/31/22  \"His wife has been very conscientious about checking blood pressure at home and the average systolic is around 499 average diastolic around 80 off the amlodipine and benazepril and I probably would avoid this medication at this time and also lower extremity edema is significantly improved. \"      Will route to [facility] to review. LOV with PCP 8/25/22    Future Appointments   Date Time Provider Keyshawn Simpson   1/3/2023  8:30 AM MD BRAVO [facility]   2/21/2023  1:15 PM JOE Trujillo - NP [facility]   3/1/2023  2:30 PM Cherylene Guardian, MD [facility].   [facility] free: [phone]

## 2022-11-16 NOTE — TELEPHONE ENCOUNTER
For Pharmacy [facility] in place:  No  Recommendation Provided To: Pharmacy: 1  Intervention Detail: Adherence Monitorin and Discontinued Rx: 1, reason: Therapy Complete  Gap Closed?: Yes   Intervention Accepted By: Pharmacy: 1  Time Spent (min): 15

## 2022-12-15 PROBLEM — I85.10 ESOPHAGEAL VARICES IN CIRRHOSIS (HCC): Status: ACTIVE | Noted: 2022-12-15

## 2022-12-15 PROBLEM — K76.82 HEPATIC ENCEPHALOPATHY (HCC): Status: ACTIVE | Noted: 2022-12-15

## 2022-12-15 PROBLEM — K74.60 ESOPHAGEAL VARICES IN CIRRHOSIS (HCC): Status: ACTIVE | Noted: 2022-12-15

## 2022-12-26 NOTE — PROGRESS NOTES
OFFICE NOTE    9/24/20  Name: Laquita Matias  LKR:3/91/[birth year]91   Sex:male   Age:80 y.o. SUBJECTIVE  Chief Complaint   Patient presents with    Hypertension    Atrial Fibrillation       Patient presents for routine follow up. Requires routine medication refills. Having issues with elevated BP, weakness and fatigue. Had a partial hip replacement and Dx of prostate cancer since last visit. Review of Systems   Constitutional: Positive for fatigue. Negative for appetite change, fever and unexpected weight change. HENT: Negative for congestion, ear pain, hearing loss, sinus pain, sore throat and trouble swallowing. Eyes: Negative for photophobia, redness and visual disturbance. Respiratory: Negative for cough, shortness of breath and wheezing. Cardiovascular: Negative for chest pain, palpitations and leg swelling. Gastrointestinal: Negative for abdominal pain, blood in stool, constipation, diarrhea and vomiting. Endocrine: Negative for cold intolerance, polydipsia and polyuria. Genitourinary: Negative for difficulty urinating, genital sores, hematuria and urgency. Musculoskeletal: Negative for arthralgias, back pain and joint swelling. Skin: Negative for pallor and rash. Allergic/Immunologic: Negative for food allergies. Neurological: Positive for weakness. Negative for dizziness, tremors, syncope and headaches. Hematological: Negative for adenopathy. Does not bruise/bleed easily. Psychiatric/Behavioral: Positive for dysphoric mood and sleep disturbance. Negative for agitation and hallucinations.             Current Outpatient Medications:     amLODIPine-benazepril (LOTREL) 10-20 MG per capsule, Take 1 capsule by mouth daily, Disp: 90 capsule, Rfl: 1    apixaban (ELIQUIS) 5 MG TABS tablet, Take 1 tablet by mouth 2 times daily, Disp: 60 tablet, Rfl: 5    sotalol (BETAPACE) 80 MG tablet, Take 0.5 tablets by mouth 2 times daily, Disp: 90 tablet, Rfl: 1    ursodiol (ACTIGALL) 300 MG Osteoporosis Mother      Social History     Tobacco History     Smoking Status  Never Smoker    Smokeless Tobacco Use  Never Used          Alcohol History     Alcohol Use Status  No          Drug Use     Drug Use Status  No          Sexual Activity     Sexually Active  Not Currently Partners  Female              OBJECTIVE  Vitals:    09/24/20 0839   BP: (!) 148/66   Site: Left Upper Arm   Position: Sitting   Pulse: 52   Temp: 97.8 °F (36.6 °C)   TempSrc: Temporal   SpO2: 93%   Weight: 181 lb 3.2 oz (82.2 kg)        Body mass index is 23.91 kg/m². Patient is normal weight    No orders of the defined types were placed in this encounter. EXAM   Physical Exam  Vitals signs and nursing note reviewed. Constitutional:       Appearance: Normal appearance. He is well-developed and normal weight. HENT:      Right Ear: Tympanic membrane, ear canal and external ear normal.      Left Ear: Tympanic membrane, ear canal and external ear normal.      Nose: Nose normal. No congestion. Mouth/Throat:      Pharynx: Oropharynx is clear. Eyes:      General: No scleral icterus. Conjunctiva/sclera: Conjunctivae normal.      Pupils: Pupils are equal, round, and reactive to light. Neck:      Musculoskeletal: Normal range of motion and neck supple. Thyroid: No thyroid mass or thyromegaly. Vascular: No carotid bruit or JVD. Trachea: Trachea normal.   Cardiovascular:      Rate and Rhythm: Normal rate. Rhythm irregular. Pulses: Normal pulses. Heart sounds: Normal heart sounds. No murmur. No gallop. Pulmonary:      Effort: Pulmonary effort is normal.      Breath sounds: Normal breath sounds. No wheezing, rhonchi or rales. Abdominal:      General: Bowel sounds are normal. There is no distension. Palpations: Abdomen is soft. There is no mass. Tenderness: There is no abdominal tenderness. There is no guarding. Hernia: No hernia is present.    Musculoskeletal: Normal range of motion. General: No swelling or tenderness. Right lower leg: No edema. Left lower leg: No edema. Comments: Moderate OA hands and knees. Total left hip arthroplasty. Moderate to severe OA right   Lymphadenopathy:      Cervical: No cervical adenopathy. Skin:     General: Skin is warm and dry. Coloration: Skin is not jaundiced. Findings: No bruising or rash. Neurological:      General: No focal deficit present. Mental Status: He is alert and oriented to person, place, and time. Sensory: No sensory deficit. Motor: No weakness or abnormal muscle tone. Coordination: Coordination normal.      Gait: Gait normal.   Psychiatric:         Behavior: Behavior normal.      Comments: Poor eye contact. Affect a little flat. Mathew Gamboa was seen today for hypertension and atrial fibrillation. Diagnoses and all orders for this visit:    Chronic diastolic heart failure (HCC)  -     furosemide (LASIX) 20 MG tablet; Take 1 tablet by mouth daily With evening meal  Seems compensated, no changes made  Essential hypertension  -     amLODIPine-benazepril (LOTREL) 10-20 MG per capsule; Take 1 capsule by mouth daily  Systolic 911, diastolic low at 66, acceptable at his age, no changes made  Persistent atrial fibrillation  -     apixaban (ELIQUIS) 5 MG TABS tablet; Take 1 tablet by mouth 2 times daily  -     sotalol (BETAPACE) 80 MG tablet; Take 0.5 tablets by mouth 2 times daily  Pacemaker was removed due to infection. Status post hip hemiarthroplasty  Right hip at least moderately arthritic. X-ay ordeed  Prostate cancer ([facility] [address]  -     Cancel: XR HIP 2-3 VW W PELVIS RIGHT; Future    Primary osteoarthritis of right hip  -     Cancel: XR HIP 2-3 VW W PELVIS RIGHT; Future  -     methylPREDNISolone acetate (DEPO-MEDROL) injection 40 mg    Adjustment disorder with depressed mood  -     buPROPion (WELLBUTRIN XL) 150 MG extended release tablet;  Take 1 tablet by mouth every morning  Did not do well on SSRI in past, will try this as seems to unmotivated and trouble staying awake during day  Other orders  -     ursodiol (ACTIGALL) 300 MG capsule; Take 1 capsule by mouth 2 times daily          Return in about 1 month (around 10/24/2020). Electronically signed by Marcy Rosario MD on 9/24/20 at 8:55 AM EDT    I have personally reviewed and updated the chief complaint, HPI, Past Medical, Family and Social History, as well as the above Review of Systems. no

## 2023-01-03 ENCOUNTER — OFFICE VISIT (OUTPATIENT)
Dept: FAMILY MEDICINE CLINIC | Age: 83
End: 2023-01-03
Payer: MEDICARE

## 2023-01-03 VITALS
HEIGHT: 73 IN | DIASTOLIC BLOOD PRESSURE: 70 MMHG | BODY MASS INDEX: 21.66 KG/M2 | SYSTOLIC BLOOD PRESSURE: 130 MMHG | RESPIRATION RATE: 18 BRPM | WEIGHT: 163.4 LBS | OXYGEN SATURATION: 94 % | TEMPERATURE: 97.4 F | HEART RATE: 84 BPM

## 2023-01-03 DIAGNOSIS — I50.22 CHRONIC SYSTOLIC CONGESTIVE HEART FAILURE (HCC): ICD-10-CM

## 2023-01-03 DIAGNOSIS — I85.10 ESOPHAGEAL VARICES IN CIRRHOSIS (HCC): ICD-10-CM

## 2023-01-03 DIAGNOSIS — J44.9 CHRONIC OBSTRUCTIVE PULMONARY DISEASE, UNSPECIFIED COPD TYPE (HCC): ICD-10-CM

## 2023-01-03 DIAGNOSIS — I48.19 PERSISTENT ATRIAL FIBRILLATION (HCC): ICD-10-CM

## 2023-01-03 DIAGNOSIS — C61 PROSTATE CANCER (HCC): ICD-10-CM

## 2023-01-03 DIAGNOSIS — Z79.01 CHRONIC ANTICOAGULATION: Primary | Chronic | ICD-10-CM

## 2023-01-03 DIAGNOSIS — R35.0 BENIGN PROSTATIC HYPERPLASIA WITH URINARY FREQUENCY: ICD-10-CM

## 2023-01-03 DIAGNOSIS — N18.31 STAGE 3A CHRONIC KIDNEY DISEASE (HCC): ICD-10-CM

## 2023-01-03 DIAGNOSIS — K74.60 ESOPHAGEAL VARICES IN CIRRHOSIS (HCC): ICD-10-CM

## 2023-01-03 DIAGNOSIS — D69.6 THROMBOCYTOPENIA, UNSPECIFIED (HCC): ICD-10-CM

## 2023-01-03 DIAGNOSIS — N40.1 BENIGN PROSTATIC HYPERPLASIA WITH URINARY FREQUENCY: ICD-10-CM

## 2023-01-03 PROCEDURE — 1123F ACP DISCUSS/DSCN MKR DOCD: CPT | Performed by: FAMILY MEDICINE

## 2023-01-03 PROCEDURE — 96372 THER/PROPH/DIAG INJ SC/IM: CPT | Performed by: FAMILY MEDICINE

## 2023-01-03 PROCEDURE — 3075F SYST BP GE 130 - 139MM HG: CPT | Performed by: FAMILY MEDICINE

## 2023-01-03 PROCEDURE — 3078F DIAST BP <80 MM HG: CPT | Performed by: FAMILY MEDICINE

## 2023-01-03 PROCEDURE — 99214 OFFICE O/P EST MOD 30 MIN: CPT | Performed by: FAMILY MEDICINE

## 2023-01-03 RX ORDER — METHYLPREDNISOLONE ACETATE 80 MG/ML
40 INJECTION, SUSPENSION INTRA-ARTICULAR; INTRALESIONAL; INTRAMUSCULAR; SOFT TISSUE ONCE
Status: COMPLETED | OUTPATIENT
Start: 2023-01-03 | End: 2023-01-03

## 2023-01-03 RX ORDER — URSODIOL 300 MG/1
300 CAPSULE ORAL 2 TIMES DAILY
Qty: 180 CAPSULE | Refills: 1 | Status: SHIPPED | OUTPATIENT
Start: 2023-01-03

## 2023-01-03 RX ORDER — TAMSULOSIN HYDROCHLORIDE 0.4 MG/1
0.4 CAPSULE ORAL DAILY
Qty: 30 CAPSULE | Refills: 5 | Status: SHIPPED | OUTPATIENT
Start: 2023-01-03

## 2023-01-03 RX ORDER — POTASSIUM BICARBONATE 25 MEQ/1
25 TABLET, EFFERVESCENT ORAL DAILY
Qty: 30 TABLET | Refills: 5 | Status: SHIPPED | OUTPATIENT
Start: 2023-01-03 | End: 2024-01-03

## 2023-01-03 RX ORDER — DOXYCYCLINE HYCLATE 100 MG
100 TABLET ORAL 2 TIMES DAILY
Qty: 14 TABLET | Refills: 0 | Status: SHIPPED | OUTPATIENT
Start: 2023-01-03 | End: 2023-01-10

## 2023-01-03 RX ORDER — GUAIFENESIN 600 MG/1
600 TABLET, EXTENDED RELEASE ORAL 2 TIMES DAILY
Qty: 30 TABLET | Refills: 0 | Status: SHIPPED | OUTPATIENT
Start: 2023-01-03 | End: 2023-01-18

## 2023-01-03 RX ADMIN — METHYLPREDNISOLONE ACETATE 40 MG: 80 INJECTION, SUSPENSION INTRA-ARTICULAR; INTRALESIONAL; INTRAMUSCULAR; SOFT TISSUE at 09:23

## 2023-01-03 ASSESSMENT — ENCOUNTER SYMPTOMS
SHORTNESS OF BREATH: 0
BLOOD IN STOOL: 0
COLOR CHANGE: 0
WHEEZING: 0
ABDOMINAL DISTENTION: 1
SINUS PRESSURE: 1
ABDOMINAL PAIN: 0
SORE THROAT: 1
PHOTOPHOBIA: 0
EYE REDNESS: 0
COUGH: 1

## 2023-01-03 ASSESSMENT — PATIENT HEALTH QUESTIONNAIRE - PHQ9
1. LITTLE INTEREST OR PLEASURE IN DOING THINGS: 0
2. FEELING DOWN, DEPRESSED OR HOPELESS: 0
SUM OF ALL RESPONSES TO PHQ QUESTIONS 1-9: 0
SUM OF ALL RESPONSES TO PHQ9 QUESTIONS 1 & 2: 0
SUM OF ALL RESPONSES TO PHQ QUESTIONS 1-9: 0

## 2023-01-03 ASSESSMENT — LIFESTYLE VARIABLES
HOW MANY STANDARD DRINKS CONTAINING ALCOHOL DO YOU HAVE ON A TYPICAL DAY: PATIENT DOES NOT DRINK
HOW OFTEN DO YOU HAVE A DRINK CONTAINING ALCOHOL: NEVER

## 2023-01-03 NOTE — PROGRESS NOTES
OFFICE NOTE    1/3/23  Name: Karla Flanagan  PXT:[birth year]   Sex:male   Age:82 y.o. SUBJECTIVE  Chief Complaint   Patient presents with    Nasal Congestion     X 3 wks     Sinusitis       HPI Last year was a tough year. Usually comes in in wheelchair, has had issues with his cirrhosis and also chronic CHF. Seems to have stabilized some. Review of Systems   Constitutional:  Positive for activity change and fatigue. Negative for appetite change and unexpected weight change. HENT:  Positive for congestion, hearing loss, postnasal drip, sinus pressure and sore throat. Eyes:  Negative for photophobia, redness and visual disturbance. Respiratory:  Positive for cough. Negative for shortness of breath and wheezing. Cardiovascular:  Positive for palpitations and leg swelling. Negative for chest pain. Gastrointestinal:  Positive for abdominal distention. Negative for abdominal pain and blood in stool. Genitourinary:  Negative for dysuria and hematuria. Musculoskeletal:  Positive for arthralgias and gait problem. Skin:  Negative for color change, pallor and rash. Neurological:  Positive for weakness. Negative for tremors, seizures and numbness. Psychiatric/Behavioral:  The patient is nervous/anxious. All other systems reviewed and are negative.          Current Outpatient Medications:     tamsulosin (FLOMAX) 0.4 MG capsule, Take 1 capsule by mouth daily, Disp: 30 capsule, Rfl: 5    apixaban (ELIQUIS) 5 MG TABS tablet, Take 1 tablet by mouth 2 times daily, Disp: 60 tablet, Rfl: 5    ursodiol (ACTIGALL) 300 MG capsule, Take 1 capsule by mouth 2 times daily, Disp: 180 capsule, Rfl: 1    potassium bicarbonate (EFFER-K) 25 MEQ disintegrating tablet, Take 1 tablet by mouth daily, Disp: 30 tablet, Rfl: 5    doxycycline hyclate (VIBRA-TABS) 100 MG tablet, Take 1 tablet by mouth 2 times daily for 7 days, Disp: 14 tablet, Rfl: 0    guaiFENesin (MUCINEX) 600 MG extended release tablet, Take 1 tablet by mouth 2 times daily for 15 days, Disp: 30 tablet, Rfl: 0    Elastic Bandages & Supports (ABDOMINAL BINDER/ELASTIC LARGE) MISC, 1 each by Does not apply route once for 1 dose Place around waist in region of hernia to prevent it from protruding., Disp: 1 each, Rfl: 0    lactulose (CHRONULAC) 10 GM/15ML solution, Take 15 mLs by mouth 3 times daily, Disp: 1350 mL, Rfl: 3    spironolactone (ALDACTONE) 100 MG tablet, Take 0.5 tablets by mouth daily, Disp: 30 tablet, Rfl: 5    bumetanide (BUMEX) 1 MG tablet, Take 1 mg by mouth in the morning and 1 mg before bedtime. , Disp: , Rfl:     acetaminophen (TYLENOL) 325 MG tablet, Take 650 mg by mouth every 6 hours as needed for Pain, Disp: , Rfl:     Cholecalciferol (VITAMIN D) 50 MCG (2000 UT) CAPS capsule, Take by mouth, Disp: , Rfl:     Zinc Sulfate (ZINC 15 PO), Take by mouth, Disp: , Rfl:     Handicap Placard MISC, by Does not apply route 5 years.  OA as diagnosis, Disp: 2 each, Rfl: 0    Multiple Vitamins-Minerals (THERAPEUTIC MULTIVITAMIN-MINERALS) tablet, Take 1 tablet by mouth daily, Disp: , Rfl:     leuprolide acetate, 6 Month, (ELIGARD) 45 MG injection, Inject 45 mg into the skin once One injection every 6 months (Patient not taking: No sig reported), Disp: , Rfl:   Allergies   Allergen Reactions    Flecainide Dermatitis    Zoloft [Sertraline]        Past Medical History:   Diagnosis Date    Arthritis     Knees    Atrial fibrillation (HCC)     Cancer (HCC)     skin cancer; Prostate cancer     CHF (congestive heart failure) (HCC)     [facility] 7/20/2022    Gallstone pancreatitis     H/O esophagogastroduodenoscopy 02/2013    dr Flaherty Beauregard no Hpylori, or Howell's     H/O ETOH abuse     sober for several years    Hypertension     MDRO (multiple drug resistant organisms) resistance     Pacemaker     REMOVED 2018 due to infection     Past Surgical History:   Procedure Laterality Date    [facility] REMOVAL Bilateral     CHOLECYSTECTOMY      COLONOSCOPY  06/11/2016    additional dates: 2003,02/28/2013    ENDOSCOPY, COLON, DIAGNOSTIC      ERCP      and ston removal 03/2007    EYE SURGERY Bilateral     cataracts    HIP SURGERY Left 04/24/2020    HIP HEMIARTHROPLASTY -- HILARY performed by Dunia Chen DO at [facility]      cardioversion    PACEMAKER PLACEMENT      LASER LEAD EXTRACTION  03/02/2018    SKIN BIOPSY      TONSILLECTOMY       Family History   Problem Relation Age of Onset    Coronary Art Dis Mother     Osteoporosis Mother     Hypertension Father     Heart Disease Father     Emphysema Father      Social History       Tobacco History       Smoking Status  Never      Smokeless Tobacco Use  Never              Alcohol History       Alcohol Use Status  No              Drug Use       Drug Use Status  No              Sexual Activity       Sexually Active  Not Currently Partners  Female                    OBJECTIVE  Vitals:    01/03/23 0829   BP: 130/70   Pulse: 84   Resp: 18   Temp: 97.4 °F (36.3 °C)   TempSrc: Temporal   SpO2: 94%   Weight: 163 lb 6.4 oz (74.1 kg)   Height: 6' 1\" (1.854 m)        Body mass index is 21.56 kg/m². No orders of the defined types were placed in this encounter. EXAM   Physical Exam  Vitals and nursing note reviewed. Constitutional:       Appearance: Normal appearance. He is normal weight. HENT:      Right Ear: Tympanic membrane and external ear normal.      Left Ear: Tympanic membrane and external ear normal.      Nose: Congestion present. Mouth/Throat:      Pharynx: Oropharynx is clear. Posterior oropharyngeal erythema present. Eyes:      General: No scleral icterus. Conjunctiva/sclera: Conjunctivae normal.      Pupils: Pupils are equal, round, and reactive to light. Cardiovascular:      Rate and Rhythm: Normal rate and regular rhythm. Heart sounds: Murmur heard.    Pulmonary:      Effort: Pulmonary effort is normal. Breath sounds: No wheezing, rhonchi or rales. Abdominal:      General: Bowel sounds are normal.      Palpations: There is no mass. Tenderness: There is no abdominal tenderness. Comments: May have some ascites but much improved   Musculoskeletal:         General: No swelling or tenderness. Cervical back: No tenderness. Right lower leg: Edema present. Left lower leg: Edema present. Comments: Just trace edema present   Lymphadenopathy:      Cervical: No cervical adenopathy. Skin:     Coloration: Skin is not jaundiced or pale. Findings: No bruising or rash. Neurological:      General: No focal deficit present. Mental Status: He is alert and oriented to person, place, and time. Sensory: No sensory deficit. Motor: Weakness present. Coordination: Coordination normal.      Gait: Gait normal.      Comments: Negative asterixis   Psychiatric:         Mood and Affect: Mood normal.         Behavior: Behavior normal.         Sam was seen today for nasal congestion and sinusitis. Diagnoses and all orders for this visit:    Chronic anticoagulation-Eliquis  Will be seeing Cardiologist next week. Ask June to see if can lower dose of Eliquis to 2.5 mg bid or not  Benign prostatic hyperplasia with urinary frequency  -     tamsulosin (FLOMAX) 0.4 MG capsule; Take 1 capsule by mouth daily  Controls BPH symptoms pretty well. Persistent atrial fibrillation (HCC)  -     apixaban (ELIQUIS) 5 MG TABS tablet; Take 1 tablet by mouth 2 times daily  Seemed regular to  me today.  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 Last PSA 0.9 will probably not take any more Lupron shots  Stage 3a chronic kidney disease (HCC)  GFR most recently 58 not too worried  Other orders  -     ursodiol (ACTIGALL) 300 MG capsule; Take 1 capsule by mouth 2 times daily  -     potassium bicarbonate (EFFER-K) 25 MEQ disintegrating tablet; Take 1 tablet by mouth daily  -     methylPREDNISolone acetate (DEPO-MEDROL) injection 40 mg  -     doxycycline hyclate (VIBRA-TABS) 100 MG tablet; Take 1 tablet by mouth 2 times daily for 7 days  -     guaiFENesin (MUCINEX) 600 MG extended release tablet; Take 1 tablet by mouth 2 times daily for 15 days  Reviewed last GI note, they seem happy. Was given Doxy and guaibid for sinus infection      Return in about 6 months (around 7/3/2023).     Electronically signed by Eduardo Chaparro MD on 1/3/23 at 8:52 AM EST

## 2023-01-30 ENCOUNTER — TELEPHONE (OUTPATIENT)
Dept: FAMILY MEDICINE CLINIC | Age: 83
End: 2023-01-30

## 2023-01-30 NOTE — TELEPHONE ENCOUNTER
Kristal Mora saw Dr Juanjose Menjivar recently. He is in agreement with reducing the Eliquis to 2.5mg  twice a day. She is asking you to send a new script for that to [facility] in [place].

## 2023-02-08 RX ORDER — ACETAMINOPHEN 500 MG
500 TABLET ORAL EVERY 6 HOURS PRN
COMMUNITY

## 2023-02-08 NOTE — PROGRESS NOTES
[facility] PRE-ADMISSION TESTING INSTRUCTIONS    The Preadmission Testing patient is instructed accordingly using the following criteria (check applicable):    ARRIVAL INSTRUCTIONS:  [x] Parking the day of Surgery is located in the Main Entrance lot. Upon entering the door, make an immediate right to the surgery reception desk    [x] Bring photo ID and insurance card    [] Bring in a copy of Living will or Durable Power of  papers. [x] Please be sure to arrange for responsible adult to provide transportation to and from the hospital    [x] Please arrange for responsible adult to be with you for the 24 hour period post procedure due to having anesthesia    [x] If you awake am of surgery not feeling well or have temperature >100 please call [phone]    GENERAL INSTRUCTIONS:    [x] Nothing by mouth after midnight, including gum, candy, mints or water    [x] You may brush your teeth, but do not swallow any water    [x] Take medications as instructed with 1-2 oz of water    [x] Stop herbal supplements and vitamins 5 days prior to procedure    [x] Follow preop dosing of blood thinners per physician instructions    [] Take 1/2 dose of evening insulin, but no insulin after midnight    [] No oral diabetic medications after midnight    [] If diabetic and have low blood sugar or feel symptomatic, take 1-2oz apple juice only    [] Bring inhalers day of surgery    [] Bring C-PAP/ Bi-Pap day of surgery    [] Bring urine specimen day of surgery    [x] Shower or bath with soap, lather and rinse well, AM of Surgery, no lotion, powders or creams to surgical site    [] Follow bowel prep as instructed per surgeon    [x] No tobacco products within 24 hours of surgery     [x] No alcohol or illegal drug use within 24 hours of surgery.     [x] Jewelry, body piercing's, eyeglasses, contact lenses and dentures are not permitted into surgery (bring cases)      [x] Please do not wear any nail polish, make up or hair products on the day of surgery    [x] You can expect a call the business day prior to procedure to notify you if your arrival time changes    [] If you receive a survey after surgery we would greatly appreciate your comments    [] Parent/guardian of a minor must accompany their child and remain on the premises  the entire time they are under our care     [] Pediatric patients may bring favorite toy, blanket or comfort item with them    [] A caregiver or family member must remain with the patient during their stay if they are mentally handicapped, have dementia, disoriented or unable to use a call light or would be a safety concern if left unattended    [x] Please notify surgeon if you develop any illness between now and time of surgery (cold, cough, sore throat, fever, nausea, vomiting) or any signs of infections  including skin, wounds, and dental.    [x]  The Outpatient Pharmacy is available to fill your prescription here on your day of surgery, ask your preop nurse for details    [] Other instructions    EDUCATIONAL MATERIALS PROVIDED:    [] PAT Preoperative Education Packet/Booklet     [] Medication List    [] Transfusion bracelet applied with instructions    [] Shower with soap, lather and rinse well, and use CHG wipes provided the evening before surgery as instructed    [] Incentive spirometer with instructions

## 2023-02-13 ENCOUNTER — ANESTHESIA (OUTPATIENT)
Dept: ENDOSCOPY | Age: 83
End: 2023-02-13
Payer: MEDICARE

## 2023-02-13 ENCOUNTER — ANESTHESIA EVENT (OUTPATIENT)
Dept: ENDOSCOPY | Age: 83
End: 2023-02-13
Payer: MEDICARE

## 2023-02-13 ENCOUNTER — HOSPITAL ENCOUNTER (OUTPATIENT)
Age: 83
Setting detail: OUTPATIENT SURGERY
Discharge: HOME | End: 2023-02-13
Attending: STUDENT IN AN ORGANIZED HEALTH CARE EDUCATION/TRAINING PROGRAM | Admitting: STUDENT IN AN ORGANIZED HEALTH CARE EDUCATION/TRAINING PROGRAM
Payer: MEDICARE

## 2023-02-13 VITALS
WEIGHT: 165 LBS | RESPIRATION RATE: 15 BRPM | TEMPERATURE: 97.5 F | HEIGHT: 73 IN | OXYGEN SATURATION: 98 % | HEART RATE: 71 BPM | SYSTOLIC BLOOD PRESSURE: 146 MMHG | BODY MASS INDEX: 21.87 KG/M2 | DIASTOLIC BLOOD PRESSURE: 81 MMHG

## 2023-02-13 PROCEDURE — 2580000003 HC RX 258

## 2023-02-13 PROCEDURE — 7100000010 HC PHASE II RECOVERY - FIRST 15 MIN: Performed by: STUDENT IN AN ORGANIZED HEALTH CARE EDUCATION/TRAINING PROGRAM

## 2023-02-13 PROCEDURE — 2720000010 HC SURG SUPPLY STERILE: Performed by: STUDENT IN AN ORGANIZED HEALTH CARE EDUCATION/TRAINING PROGRAM

## 2023-02-13 PROCEDURE — 3700000000 HC ANESTHESIA ATTENDED CARE: Performed by: STUDENT IN AN ORGANIZED HEALTH CARE EDUCATION/TRAINING PROGRAM

## 2023-02-13 PROCEDURE — 6370000000 HC RX 637 (ALT 250 FOR IP): Performed by: STUDENT IN AN ORGANIZED HEALTH CARE EDUCATION/TRAINING PROGRAM

## 2023-02-13 PROCEDURE — 2709999900 HC NON-CHARGEABLE SUPPLY: Performed by: STUDENT IN AN ORGANIZED HEALTH CARE EDUCATION/TRAINING PROGRAM

## 2023-02-13 PROCEDURE — 3700000001 HC ADD 15 MINUTES (ANESTHESIA): Performed by: STUDENT IN AN ORGANIZED HEALTH CARE EDUCATION/TRAINING PROGRAM

## 2023-02-13 PROCEDURE — 6360000002 HC RX W HCPCS

## 2023-02-13 PROCEDURE — 7100000011 HC PHASE II RECOVERY - ADDTL 15 MIN: Performed by: STUDENT IN AN ORGANIZED HEALTH CARE EDUCATION/TRAINING PROGRAM

## 2023-02-13 PROCEDURE — 3609012300 HC EGD BAND LIGATION ESOPHGEAL/GASTRIC VARICES: Performed by: STUDENT IN AN ORGANIZED HEALTH CARE EDUCATION/TRAINING PROGRAM

## 2023-02-13 RX ORDER — SODIUM CHLORIDE 0.9 % (FLUSH) 0.9 %
5-40 SYRINGE (ML) INJECTION PRN
Status: DISCONTINUED | OUTPATIENT
Start: 2023-02-13 | End: 2023-02-13 | Stop reason: HOSPADM

## 2023-02-13 RX ORDER — SODIUM CHLORIDE 0.9 % (FLUSH) 0.9 %
5-40 SYRINGE (ML) INJECTION EVERY 12 HOURS SCHEDULED
Status: DISCONTINUED | OUTPATIENT
Start: 2023-02-13 | End: 2023-02-13 | Stop reason: HOSPADM

## 2023-02-13 RX ORDER — PROPOFOL 10 MG/ML
INJECTION, EMULSION INTRAVENOUS PRN
Status: DISCONTINUED | OUTPATIENT
Start: 2023-02-13 | End: 2023-02-13 | Stop reason: SDUPTHER

## 2023-02-13 RX ORDER — OMEPRAZOLE 40 MG/1
40 CAPSULE, DELAYED RELEASE ORAL
Qty: 30 CAPSULE | Refills: 11 | Status: SHIPPED | OUTPATIENT
Start: 2023-02-13

## 2023-02-13 RX ORDER — ONDANSETRON 2 MG/ML
4 INJECTION INTRAMUSCULAR; INTRAVENOUS EVERY 6 HOURS PRN
Status: DISCONTINUED | OUTPATIENT
Start: 2023-02-13 | End: 2023-02-13 | Stop reason: HOSPADM

## 2023-02-13 RX ORDER — SUCRALFATE ORAL 1 G/10ML
1 SUSPENSION ORAL 4 TIMES DAILY PRN
Qty: 1200 ML | Refills: 3 | Status: SHIPPED | OUTPATIENT
Start: 2023-02-13

## 2023-02-13 RX ORDER — SODIUM CHLORIDE 9 MG/ML
25 INJECTION, SOLUTION INTRAVENOUS PRN
Status: DISCONTINUED | OUTPATIENT
Start: 2023-02-13 | End: 2023-02-13 | Stop reason: HOSPADM

## 2023-02-13 RX ORDER — ONDANSETRON 4 MG/1
4 TABLET, ORALLY DISINTEGRATING ORAL EVERY 8 HOURS PRN
Status: DISCONTINUED | OUTPATIENT
Start: 2023-02-13 | End: 2023-02-13 | Stop reason: HOSPADM

## 2023-02-13 RX ORDER — SODIUM CHLORIDE 9 MG/ML
INJECTION, SOLUTION INTRAVENOUS CONTINUOUS PRN
Status: DISCONTINUED | OUTPATIENT
Start: 2023-02-13 | End: 2023-02-13 | Stop reason: SDUPTHER

## 2023-02-13 RX ORDER — SODIUM CHLORIDE 9 MG/ML
INJECTION, SOLUTION INTRAVENOUS PRN
Status: DISCONTINUED | OUTPATIENT
Start: 2023-02-13 | End: 2023-02-13 | Stop reason: HOSPADM

## 2023-02-13 RX ORDER — NADOLOL 20 MG/1
20 TABLET ORAL DAILY
Qty: 30 TABLET | Refills: 11 | Status: SHIPPED | OUTPATIENT
Start: 2023-02-13

## 2023-02-13 RX ADMIN — SODIUM CHLORIDE: 9 INJECTION, SOLUTION INTRAVENOUS at 11:22

## 2023-02-13 RX ADMIN — DIPHENHYDRAMINE HCL 15 ML: 12.5 LIQUID ORAL at 13:00

## 2023-02-13 RX ADMIN — PROPOFOL 250 MG: 10 INJECTION, EMULSION INTRAVENOUS at 11:26

## 2023-02-13 ASSESSMENT — PAIN - FUNCTIONAL ASSESSMENT: PAIN_FUNCTIONAL_ASSESSMENT: 0-10

## 2023-02-13 NOTE — ANESTHESIA POSTPROCEDURE EVALUATION
Department of Anesthesiology  Postprocedure Note    Patient: Bruno Lee  MRN: [id number]  YOB: 1940  Date of evaluation: 2/13/2023      Procedure Summary     Date: 02/13/23 Room / Location: [place] [address] / [facility]    Anesthesia Start: 1122 Anesthesia Stop: [id number]    Procedure: EGD BAND LIGATION Diagnosis:       Esophageal varices ([address]      (Esophageal varices ([address] [I85.00])    Surgeons: Sarah Gilliam MD Responsible Provider: Zi Keyes MD    Anesthesia Type: MAC ASA Status: 3          Anesthesia Type: MAC    Emily Phase I: Emily Score: 10    Emily Phase II: Emily Score: 10      Anesthesia Post Evaluation    Patient location during evaluation: PACU  Patient participation: complete - patient participated  Level of consciousness: awake  Pain score: 3  Airway patency: patent  Nausea & Vomiting: no nausea  Complications: no  Cardiovascular status: blood pressure returned to baseline  Respiratory status: acceptable  Hydration status: euvolemic

## 2023-02-13 NOTE — DISCHARGE INSTRUCTIONS
Recommendations:  -The patient will be observed post procedure until all discharge criteria are met. -Patient has a contact number available for emergencies. The signs and symptoms of potential delayed complications were discussed with the patient.    -Return to normal activities tomorrow. -Written discharge instructions were provided to the patient.    -Resume anticoagulation in three days.  -Await pathology/cytology results. - Clear liquid diet today, full liquid diet tomorrow, soft diet on Wednesday, then advance as tolerated. - Begin daily omeprazole and nadolol.   -Clinic appointment scheduled 3/1/23.

## 2023-02-13 NOTE — H&P
[facility] ([facility])   Gastroenterology, Hepatology, &  Advanced Endoscopy    Consult       ASSESSMENT AND PLAN:    82y/M w/ cirrhosis who presents for variceal screening. PLAN:  EGD today        HISTORY OF PRESENT ILLNESS:      Mr. Tonny Lesches is an 82y/M who presents to clinic in follow-up for continued evaluation of liver disease. Patient had an [address] performed after his last visit which showed F3 fibrosis. However, other imaging studies show changes consistent with portal HTN including ascites, recannulated umbilical vein, and varices. Cirrhosis:  Etiology: SHEARER vs Cardiac  MELD: 16  HE: On lactulose  EV: EGD scheduled  Ascites: Seemingly controlled on Bumex and Spironolactone.   [address] Screen: CT Sept 2022 with no focal lesions    Past Medical History:        Diagnosis Date    Arthritis     Knees    Atrial fibrillation ([address]     Cancer ([address]     skin cancer; Prostate cancer     CHF (congestive heart failure) ([address]     Chronic obstructive pulmonary disease ([address] 07/20/2022    Cirrhosis of liver ([address]     Gallstone pancreatitis     H/O esophagogastroduodenoscopy 02/2013    dr Milagros Villafana no Hpylori, or Howell's     H/O ETOH abuse     last drink 2005    [place] (hard of hearing)     Hypertension     Inguinal hernia     RIGHT    Pacemaker     REMOVED 2018 due to infection     Past Surgical History:        Procedure Laterality Date    [facility]  2018    removed dt infection    CATARACT REMOVAL Bilateral     CHOLECYSTECTOMY      COLONOSCOPY  06/11/2016    additional dates: 2003,02/28/2013    ENDOSCOPY, COLON, DIAGNOSTIC      ERCP      and ston removal 03/2007    EYE SURGERY Bilateral     cataracts    HIP SURGERY Left 04/24/2020    HIP HEMIARTHROPLASTY -- HILARY performed by Bhanu Banerjee DO at [facility]      cardioversion    PACEMAKER PLACEMENT      LASER LEAD EXTRACTION  03/02/2018    SKIN BIOPSY      TONSILLECTOMY Social History:    TOBACCO:   reports that he has never smoked. He has never used smokeless tobacco.  ETOH:   reports no history of alcohol use. DRUGS:   reports no history of drug use. Family History:       Problem Relation Age of Onset    Coronary Art Dis Mother     Osteoporosis Mother     Hypertension Father     Heart Disease Father     Emphysema Father        No current facility-administered medications for this encounter. Current Outpatient Medications:     acetaminophen (TYLENOL) 500 MG tablet, Take 500 mg by mouth every 6 hours as needed for Pain, Disp: , Rfl:     apixaban (ELIQUIS) 2.5 MG TABS tablet, Take 1 tablet by mouth 2 times daily, Disp: 60 tablet, Rfl: 3    tamsulosin (FLOMAX) 0.4 MG capsule, Take 1 capsule by mouth daily, Disp: 30 capsule, Rfl: 5    ursodiol (ACTIGALL) 300 MG capsule, Take 1 capsule by mouth 2 times daily, Disp: 180 capsule, Rfl: 1    potassium bicarbonate (EFFER-K) 25 MEQ disintegrating tablet, Take 1 tablet by mouth daily (Patient taking differently: Take 25 mEq by mouth every other day), Disp: 30 tablet, Rfl: 5    Elastic Bandages & Supports (ABDOMINAL BINDER/ELASTIC LARGE) MISC, 1 each by Does not apply route once for 1 dose Place around waist in region of hernia to prevent it from protruding., Disp: 1 each, Rfl: 0    lactulose (CHRONULAC) 10 GM/15ML solution, Take 15 mLs by mouth 3 times daily (Patient taking differently: Take 10 g by mouth daily), Disp: 1350 mL, Rfl: 3    spironolactone (ALDACTONE) 100 MG tablet, Take 0.5 tablets by mouth daily, Disp: 30 tablet, Rfl: 5    bumetanide (BUMEX) 1 MG tablet, Take 1 mg by mouth daily, Disp: , Rfl:     Cholecalciferol (VITAMIN D) 50 MCG (2000 UT) CAPS capsule, Take by mouth, Disp: , Rfl:     Zinc Sulfate (ZINC 15 PO), Take by mouth, Disp: , Rfl:     Handicap Placard MISC, by Does not apply route 5 years.  OA as diagnosis, Disp: 2 each, Rfl: 0    leuprolide acetate, 6 Month, (ELIGARD) 45 MG injection, Inject 45 mg into the skin once One injection every 6 months (Patient not taking: No sig reported), Disp: , Rfl:     Multiple Vitamins-Minerals (THERAPEUTIC MULTIVITAMIN-MINERALS) tablet, Take 1 tablet by mouth daily, Disp: , Rfl:      Allergies:  Flecainide and Zoloft [sertraline]    ROS:  General: Patient denies n/v/f/c or weight loss. HEENT: Patient denies persistent postnasal drip, scleral icterus, drooling, persistent bleeding from nose/mouth. Resp: Patient denies SOB, wheezing, productive cough. Cards: Patient denies CP, palpitations, significant edema  GI: As above. Derm: Patient denies jaundice/rashes. Musc: Patient denies diffuse/irregular joint swelling or myalgias. PHYSICAL EXAM:  Ht 6' 1\" (1.854 m)   Wt 165 lb (74.8 kg)   BMI 21.77 kg/m²   Physical Exam:  General: Overall well-appearing, NAD  HEENT: PERRLA, EOMI, Anicteric sclera, MMM, no rhinorrhea  Cards: RRR, no LE edema  Resp: Breathing comfortably on room air, good air movement, no use of accessory muscles, no audible wheezing  Abdomen: soft, NT, ND. Extremities: Moves all extremities, no effusions or bruising.   Skin: No rashes or jaundice  Neuro: A&O x 3, CN grossly intact, non-focal exam               Electronically signed by Kathya Contreras MD on 2/13/2023 at 5:23 AM

## 2023-02-13 NOTE — ANESTHESIA PRE PROCEDURE
Department of Anesthesiology  Preprocedure Note       Name:  Wero Hernandez   Age:  80 y.o.  :  1940                                          MRN:  [id number]         Date:  2023      Surgeon: Mae López):  Kristal Brooks MD    Procedure: Procedure(s):  EGD ESOPHAGOGASTRODUODENOSCOPY    Medications prior to admission:   Prior to Admission medications    Medication Sig Start Date End Date Taking? Authorizing Provider   acetaminophen (TYLENOL) 500 MG tablet Take 500 mg by mouth every 6 hours as needed for Pain   Yes Historical Provider, MD   apixaban (ELIQUIS) 2.5 MG TABS tablet Take 1 tablet by mouth 2 times daily 23   Russ Whalen MD   tamsulosin [facility]) 0.4 MG capsule Take 1 capsule by mouth daily 1/3/23   Russ Whalen MD   ursodiol (ACTIGALL) 300 MG capsule Take 1 capsule by mouth 2 times daily 1/3/23   Russ Whalen MD   potassium bicarbonate (EFFER-K) 25 MEQ disintegrating tablet Take 1 tablet by mouth daily  Patient taking differently: Take 25 mEq by mouth every other day 1/3/23 1/3/24  Russ Whalen MD   Elastic Bandages & Supports (ABDOMINAL BINDER/ELASTIC LARGE) MISC 1 each by Does not apply route once for 1 dose Place around waist in region of hernia to prevent it from protruding. 11/9/22 1/3/23  Krsital Brooks MD   lactulose (CHRONULAC) 10 GM/15ML solution Take 15 mLs by mouth 3 times daily  Patient taking differently: Take 10 g by mouth daily 10/28/22   Jorden Lindquist APRN - CNP   spironolactone (ALDACTONE) 100 MG tablet Take 0.5 tablets by mouth daily 10/19/22   Kristal Brooks MD   bumetanide (BUMEX) 1 MG tablet Take 1 mg by mouth daily    Historical Provider, MD   Cholecalciferol (VITAMIN D) 50 MCG (2000) CAPS capsule Take by mouth    Historical Provider, MD   Zinc Sulfate (ZINC 15 PO) Take by mouth    Historical Provider, MD   Handicap Placard MISC by Does not apply route 5 years.  OA as diagnosis 20   Russ Whalen MD   leuprolide acetate, 6 Month, (ELIGARD) 45 MG injection Inject 45 mg into the skin once One injection every 6 months  Patient not taking: No sig reported    Historical Provider, MD   Multiple Vitamins-Minerals (THERAPEUTIC MULTIVITAMIN-MINERALS) tablet Take 1 tablet by mouth daily    Historical Provider, MD       Current medications:    Current Facility-Administered Medications   Medication Dose Route Frequency Provider Last Rate Last Admin   • sodium chloride flush 0.9 % injection 5-40 mL  5-40 mL IntraVENous 2 times per day Dyllan Lopez MD       • sodium chloride flush 0.9 % injection 5-40 mL  5-40 mL IntraVENous PRN Dyllan Lopez MD       • 0.9 % sodium chloride infusion  25 mL IntraVENous PRN Dyllan Lopez MD           Allergies:    Allergies   Allergen Reactions   • Flecainide Dermatitis   • Zoloft [Sertraline]        Problem List:    Patient Active Problem List   Diagnosis Code   • Paroxysmal atrial fibrillation (HCC) I48.0   • Essential hypertension I10   • Chronic diastolic heart failure (HCC) I50.32   • Mitral valve regurgitation I34.0   • Closed displaced fracture of left femoral neck ([facility]) S72.002A   • Closed left hip fracture, initial encounter ([facility]) S72.002A   • Chronic anticoagulation-Eliquis Z79.01   • Status post hip hemiarthroplasty Z96.649   • Prostate cancer (HCC) C61   • Chronic obstructive pulmonary disease (HCC) J44.9   • Hypoxia R09.02   • Weakness R53.1   • Chronic systolic (congestive) heart failure I50.22   • Greater trochanteric bursitis of left hip M70.62   • History of left hip hemiarthroplasty Z96.642   • Thrombocytopenia, unspecified D69.6   • Biliary cirrhosis (HCC) K74.5   • Chronic renal disease, stage III (HCC) [087551] N18.30   • Esophageal varices (HCC) I85.00   • Other cirrhosis of liver (HCC) K74.69   • Other ascites R18.8   • Hepatic encephalopathy K76.82   • Esophageal varices in cirrhosis (HCC) K74.60, I85.10       Past Medical History:        Diagnosis Date   • Arthritis     Knees   • Atrial fibrillation (HCC)    • Cancer (HCC)  skin cancer; Prostate cancer     CHF (congestive heart failure) (HCC)     [facility] disease ([facility] 07/20/2022    Cirrhosis of liver [facility]     Gallstone pancreatitis     H/O esophagogastroduodenoscopy 02/2013    dr Tali Durant no Hpylori, or Howell's     H/O ETOH abuse     last drink 2005    [place] (hard of hearing)     Hypertension     Inguinal hernia     RIGHT    Pacemaker     REMOVED 2018 due to infection       Past Surgical History:        Procedure Laterality Date    [address] REM[address]  2018    removed dt infection    CATARACT REMOVAL Bilateral     CHOLECYSTECTOMY      COLONOSCOPY  06/11/2016    additional dates: 2003,02/28/2013    ENDOSCOPY, COLON, DIAGNOSTIC      ERCP      and ston removal 03/2007    EYE SURGERY Bilateral     cataracts    HIP SURGERY Left 04/24/2020    HIP HEMIARTHROPLASTY -- HILARY performed by Luciana Mayes DO at [place] [address]      cardioversion    PACEMAKER PLACEMENT      LASER LEAD EXTRACTION  03/02/2018    SKIN BIOPSY      TONSILLECTOMY         Social History:    Social History     Tobacco Use    Smoking status: Never    Smokeless tobacco: Never   Substance Use Topics    Alcohol use: No     Comment: last drink 2005                                Counseling given: Not Answered      Vital Signs (Current):   Vitals:    02/08/23 1426   Weight: 165 lb (74.8 kg)   Height: 6' 1\" (1.854 m)                                              BP Readings from Last 3 Encounters:   01/03/23 130/70   10/05/22 98/62   09/27/22 138/76       NPO Status:                                                                                 BMI:   Wt Readings from Last 3 Encounters:   02/08/23 165 lb (74.8 kg)   01/03/23 163 lb 6.4 oz (74.1 kg)   11/09/22 158 lb 12.8 oz (72 kg)     Body mass index is 21.77 kg/m².     CBC:   Lab Results   Component Value Date/Time    WBC 2.8 09/29/2022 08:03 AM    RBC 3.95 09/29/2022 08:03 AM    HGB 11.2 09/29/2022 08:03 AM    HCT 36.6 09/29/2022 08:03 AM    MCV 92.7 09/29/2022 08:03 AM    RDW 16.6 09/29/2022 08:03 AM     09/29/2022 08:03 AM       CMP:   Lab Results   Component Value Date/Time     09/08/2022 11:35 AM    K 4.3 09/08/2022 11:35 AM    K 4.6 04/26/2020 05:06 AM    CL 97 09/08/2022 11:35 AM    CO2 29 09/08/2022 11:35 AM    BUN 24 09/08/2022 11:35 AM    CREATININE 1.2 09/08/2022 11:35 AM    GFRAA 59 08/25/2022 10:28 AM    AGRATIO 0.8 09/08/2022 11:35 AM    LABGLOM 58 09/08/2022 11:35 AM    GLUCOSE 106 09/08/2022 11:35 AM    PROT 7.2 09/29/2022 08:03 AM    CALCIUM 8.6 09/08/2022 11:35 AM    BILITOT 1.4 09/29/2022 08:03 AM    ALKPHOS 368 09/29/2022 08:03 AM    AST 33 09/29/2022 08:03 AM    ALT 24 09/29/2022 08:03 AM       POC Tests: No results for input(s): POCGLU, POCNA, POCK, POCCL, POCBUN, POCHEMO, POCHCT in the last 72 hours.     Coags:   Lab Results   Component Value Date/Time    PROTIME 15.2 09/29/2022 08:03 AM    PROTIME 15.9 09/12/2022 04:03 PM    INR 1.4 09/29/2022 08:03 AM    APTT 38.8 07/23/2022 11:55 AM       HCG (If Applicable): No results found for: PREGTESTUR, PREGSERUM, HCG, HCGQUANT     ABGs: No results found for: PHART, PO2ART, VKZ7JQB, FQU6OBL, BEART, H1UNJKAX     Type & Screen (If Applicable):  No results found for: LABABO, LABRH    Drug/Infectious Status (If Applicable):  No results found for: HIV, HEPCAB    COVID-19 Screening (If Applicable):   Lab Results   Component Value Date/Time    COVID19  07/23/2022 04:03 PM      Comment:      SARS-CoV2 [id number]-9 RNA Resp Ql BLAIRE+probe           Anesthesia Evaluation  Patient summary reviewed  Airway: Mallampati: II  TM distance: >3 FB   Neck ROM: full  Mouth opening: > = 3 FB   Dental: normal exam         Pulmonary: breath sounds clear to auscultation  (+) COPD:                             Cardiovascular:    (+) hypertension:, dysrhythmias: atrial fibrillation, CHF:,         Rhythm: regular  Rate: normal                    Neuro/Psych:   Negative Neuro/Psych ROS              GI/Hepatic/Renal:   (+) liver disease:,           Endo/Other: Negative Endo/Other ROS   (+) blood dyscrasia: anticoagulation therapy:., .                 Abdominal:       Abdomen: soft. Vascular: Other Findings:           Anesthesia Plan      MAC     ASA 3       Induction: intravenous. Anesthetic plan and risks discussed with patient. Plan discussed with CRNA.                     Altagracia Sellers MD   2/13/2023

## 2023-02-13 NOTE — OP NOTE
Operative Note      Patient: Adelita Cancino  YOB: 1940  MRN: [id number]    Date of Procedure: 2/13/2023    Pre-Op Diagnosis: Esophageal varices ([address] [I85.00]    Post-Op Diagnosis: {MH OR IANO:482590697}       Procedure(s):  EGD BAND LIGATION    Surgeon(s):  Kathya Contreras MD    Assistant:   * No surgical staff found *    Anesthesia: Monitor Anesthesia Care    Estimated Blood Loss (mL): {NUMBERS; VVE:61395}    Complications: {Symptoms; Intra-op complications:23145}    Specimens:   * No specimens in log *    Implants:  * No implants in log *      Drains: * No LDAs found *    Findings:     EGD:  Grade II varices with high risk features. Banding x 3 performed. Normal Z line. Severe portal hypertensive gastropathy in the proximal stomach. Normal antrum. Normal duodenum.      Detailed Description of Procedure:   ***    Electronically signed by Kathya Contreras MD on 2/13/2023 at 11:50 AM

## 2023-03-01 ENCOUNTER — OFFICE VISIT (OUTPATIENT)
Dept: GASTROENTEROLOGY | Age: 83
End: 2023-03-01
Payer: MEDICARE

## 2023-03-01 VITALS
WEIGHT: 165 LBS | OXYGEN SATURATION: 96 % | RESPIRATION RATE: 18 BRPM | DIASTOLIC BLOOD PRESSURE: 60 MMHG | SYSTOLIC BLOOD PRESSURE: 107 MMHG | TEMPERATURE: 96.9 F | HEART RATE: 63 BPM | BODY MASS INDEX: 21.87 KG/M2 | HEIGHT: 73 IN

## 2023-03-01 DIAGNOSIS — K74.69 OTHER CIRRHOSIS OF LIVER (HCC): ICD-10-CM

## 2023-03-01 DIAGNOSIS — K40.90 UNILATERAL INGUINAL HERNIA WITHOUT OBSTRUCTION OR GANGRENE, RECURRENCE NOT SPECIFIED: ICD-10-CM

## 2023-03-01 DIAGNOSIS — K74.60 ESOPHAGEAL VARICES IN CIRRHOSIS (HCC): Primary | ICD-10-CM

## 2023-03-01 DIAGNOSIS — I85.10 ESOPHAGEAL VARICES IN CIRRHOSIS (HCC): Primary | ICD-10-CM

## 2023-03-01 DIAGNOSIS — R18.8 OTHER ASCITES: ICD-10-CM

## 2023-03-01 PROCEDURE — 99213 OFFICE O/P EST LOW 20 MIN: CPT | Performed by: STUDENT IN AN ORGANIZED HEALTH CARE EDUCATION/TRAINING PROGRAM

## 2023-03-01 PROCEDURE — 3074F SYST BP LT 130 MM HG: CPT | Performed by: STUDENT IN AN ORGANIZED HEALTH CARE EDUCATION/TRAINING PROGRAM

## 2023-03-01 PROCEDURE — 3078F DIAST BP <80 MM HG: CPT | Performed by: STUDENT IN AN ORGANIZED HEALTH CARE EDUCATION/TRAINING PROGRAM

## 2023-03-01 PROCEDURE — 1123F ACP DISCUSS/DSCN MKR DOCD: CPT | Performed by: STUDENT IN AN ORGANIZED HEALTH CARE EDUCATION/TRAINING PROGRAM

## 2023-03-06 DIAGNOSIS — K74.69 OTHER CIRRHOSIS OF LIVER (HCC): ICD-10-CM

## 2023-03-06 LAB
ALBUMIN SERPL-MCNC: 3.3 G/DL (ref 3.5–5.2)
ALP BLD-CCNC: 361 U/L (ref 40–129)
ALT SERPL-CCNC: 32 U/L (ref 0–40)
ANION GAP SERPL CALCULATED.3IONS-SCNC: 12 MMOL/L (ref 7–16)
AST SERPL-CCNC: 45 U/L (ref 0–39)
BASOPHILS ABSOLUTE: 0.02 E9/L (ref 0–0.2)
BASOPHILS RELATIVE PERCENT: 0.7 % (ref 0–2)
BILIRUB SERPL-MCNC: 1.1 MG/DL (ref 0–1.2)
BUN BLDV-MCNC: 32 MG/DL (ref 6–23)
CALCIUM SERPL-MCNC: 9.1 MG/DL (ref 8.6–10.2)
CHLORIDE BLD-SCNC: 104 MMOL/L (ref 98–107)
CO2: 26 MMOL/L (ref 22–29)
CREAT SERPL-MCNC: 1.3 MG/DL (ref 0.7–1.2)
EOSINOPHILS ABSOLUTE: 0.06 E9/L (ref 0.05–0.5)
EOSINOPHILS RELATIVE PERCENT: 2 % (ref 0–6)
GFR SERPL CREATININE-BSD FRML MDRD: 55 ML/MIN/1.73
GLUCOSE BLD-MCNC: 96 MG/DL (ref 74–99)
HCT VFR BLD CALC: 36.1 % (ref 37–54)
HEMOGLOBIN: 11.2 G/DL (ref 12.5–16.5)
IMMATURE GRANULOCYTES #: 0.01 E9/L
IMMATURE GRANULOCYTES %: 0.3 % (ref 0–5)
INR BLD: 1.4
LYMPHOCYTES ABSOLUTE: 0.64 E9/L (ref 1.5–4)
LYMPHOCYTES RELATIVE PERCENT: 21.1 % (ref 20–42)
MCH RBC QN AUTO: 28.4 PG (ref 26–35)
MCHC RBC AUTO-ENTMCNC: 31 % (ref 32–34.5)
MCV RBC AUTO: 91.6 FL (ref 80–99.9)
MONOCYTES ABSOLUTE: 0.29 E9/L (ref 0.1–0.95)
MONOCYTES RELATIVE PERCENT: 9.5 % (ref 2–12)
NEUTROPHILS ABSOLUTE: 2.02 E9/L (ref 1.8–7.3)
NEUTROPHILS RELATIVE PERCENT: 66.4 % (ref 43–80)
PDW BLD-RTO: 17.2 FL (ref 11.5–15)
PLATELET # BLD: 115 E9/L (ref 130–450)
PMV BLD AUTO: 11 FL (ref 7–12)
POTASSIUM SERPL-SCNC: 4.9 MMOL/L (ref 3.5–5)
PROTHROMBIN TIME: 15.1 SEC (ref 9.3–12.4)
RBC # BLD: 3.94 E12/L (ref 3.8–5.8)
SODIUM BLD-SCNC: 142 MMOL/L (ref 132–146)
TOTAL PROTEIN: 6.8 G/DL (ref 6.4–8.3)
WBC # BLD: 3 E9/L (ref 4.5–11.5)

## 2023-05-25 ENCOUNTER — TELEPHONE (OUTPATIENT)
Dept: FAMILY MEDICINE CLINIC | Age: 83
End: 2023-05-25

## 2023-05-25 DIAGNOSIS — K40.90 INGUINAL HERNIA WITHOUT OBSTRUCTION OR GANGRENE, RECURRENCE NOT SPECIFIED, UNSPECIFIED LATERALITY: Primary | ICD-10-CM

## 2023-06-02 ENCOUNTER — OFFICE VISIT (OUTPATIENT)
Dept: SURGERY | Age: 83
End: 2023-06-02
Payer: MEDICARE

## 2023-06-02 VITALS
HEART RATE: 51 BPM | SYSTOLIC BLOOD PRESSURE: 132 MMHG | BODY MASS INDEX: 23.09 KG/M2 | DIASTOLIC BLOOD PRESSURE: 65 MMHG | WEIGHT: 175 LBS

## 2023-06-02 DIAGNOSIS — K70.30 ALCOHOLIC CIRRHOSIS, UNSPECIFIED WHETHER ASCITES PRESENT (HCC): Primary | ICD-10-CM

## 2023-06-02 DIAGNOSIS — K70.30 ALCOHOLIC CIRRHOSIS, UNSPECIFIED WHETHER ASCITES PRESENT (HCC): ICD-10-CM

## 2023-06-02 LAB
ALBUMIN SERPL-MCNC: 3.6 G/DL (ref 3.5–5.2)
ALP SERPL-CCNC: 375 U/L (ref 40–129)
ALT SERPL-CCNC: 26 U/L (ref 0–40)
ANION GAP SERPL CALCULATED.3IONS-SCNC: 12 MMOL/L (ref 7–16)
AST SERPL-CCNC: 37 U/L (ref 0–39)
BILIRUB SERPL-MCNC: 1.1 MG/DL (ref 0–1.2)
BUN SERPL-MCNC: 33 MG/DL (ref 6–23)
CALCIUM SERPL-MCNC: 9.4 MG/DL (ref 8.6–10.2)
CHLORIDE SERPL-SCNC: 104 MMOL/L (ref 98–107)
CO2 SERPL-SCNC: 25 MMOL/L (ref 22–29)
CREAT SERPL-MCNC: 1.4 MG/DL (ref 0.7–1.2)
ERYTHROCYTE [DISTWIDTH] IN BLOOD BY AUTOMATED COUNT: 16.3 FL (ref 11.5–15)
GLUCOSE SERPL-MCNC: 91 MG/DL (ref 74–99)
HCT VFR BLD AUTO: 38 % (ref 37–54)
HGB BLD-MCNC: 11 G/DL (ref 12.5–16.5)
INR BLD: 1.2
MCH RBC QN AUTO: 28.7 PG (ref 26–35)
MCHC RBC AUTO-ENTMCNC: 28.9 % (ref 32–34.5)
MCV RBC AUTO: 99.2 FL (ref 80–99.9)
PLATELET # BLD AUTO: 113 E9/L (ref 130–450)
PMV BLD AUTO: 10.6 FL (ref 7–12)
POTASSIUM SERPL-SCNC: 4.7 MMOL/L (ref 3.5–5)
PROT SERPL-MCNC: 7.2 G/DL (ref 6.4–8.3)
PROTHROMBIN TIME: 12.9 SEC (ref 9.3–12.4)
RBC # BLD AUTO: 3.83 E12/L (ref 3.8–5.8)
SODIUM SERPL-SCNC: 141 MMOL/L (ref 132–146)
WBC # BLD: 3.3 E9/L (ref 4.5–11.5)

## 2023-06-02 PROCEDURE — 3078F DIAST BP <80 MM HG: CPT | Performed by: SURGERY

## 2023-06-02 PROCEDURE — 1123F ACP DISCUSS/DSCN MKR DOCD: CPT | Performed by: SURGERY

## 2023-06-02 PROCEDURE — 3074F SYST BP LT 130 MM HG: CPT | Performed by: SURGERY

## 2023-06-02 PROCEDURE — 99203 OFFICE O/P NEW LOW 30 MIN: CPT | Performed by: SURGERY

## 2023-06-05 ENCOUNTER — OFFICE VISIT (OUTPATIENT)
Dept: GASTROENTEROLOGY | Age: 83
End: 2023-06-05

## 2023-06-05 VITALS
RESPIRATION RATE: 18 BRPM | WEIGHT: 175 LBS | BODY MASS INDEX: 23.19 KG/M2 | HEIGHT: 73 IN | DIASTOLIC BLOOD PRESSURE: 64 MMHG | SYSTOLIC BLOOD PRESSURE: 118 MMHG

## 2023-06-05 DIAGNOSIS — K40.90 UNILATERAL INGUINAL HERNIA WITHOUT OBSTRUCTION OR GANGRENE, RECURRENCE NOT SPECIFIED: ICD-10-CM

## 2023-06-05 DIAGNOSIS — K74.69 OTHER CIRRHOSIS OF LIVER (HCC): ICD-10-CM

## 2023-06-05 DIAGNOSIS — I85.10 ESOPHAGEAL VARICES IN CIRRHOSIS (HCC): Primary | ICD-10-CM

## 2023-06-05 DIAGNOSIS — K74.60 ESOPHAGEAL VARICES IN CIRRHOSIS (HCC): Primary | ICD-10-CM

## 2023-06-08 ENCOUNTER — PREP FOR PROCEDURE (OUTPATIENT)
Dept: GASTROENTEROLOGY | Age: 83
End: 2023-06-08

## 2023-06-08 ENCOUNTER — TELEPHONE (OUTPATIENT)
Dept: GASTROENTEROLOGY | Age: 83
End: 2023-06-08

## 2023-06-08 NOTE — TELEPHONE ENCOUNTER
Prior Authorization Form:      DEMOGRAPHICS:                     Patient Name:  Yoselin Brown  Patient :  1940            Insurance:  Payor: Xochitl Hidalgo / Plan: Nelwyn Leventhal PPO / Product Type: Medicare /   Insurance ID Number:    Payer/Plan Subscr  Sex Relation Sub. Ins. ID Effective Group Num   1.  Xochitl Abbyarold Plate Daryel Maizes 1940 Male Self [id number]                                   [id number]         DIAGNOSIS & PROCEDURE:                       Procedure/Operation: EGD w/ banding           CPT Code: 30093    Diagnosis:  Esophageal varices    ICD10 Code: I85.00    Location:  [address] [place] [address]    Surgeon:  Dr. Katty Wood     SCHEDULING INFORMATION:                          Date: 2023    Time: 1415              Anesthesia:  MAC/TIVA                                                       Status:  Outpatient            Electronically signed by Laurance Merlin, MA on 2023 at 10:14 AM

## 2023-06-12 ENCOUNTER — HOSPITAL ENCOUNTER (OUTPATIENT)
Age: 83
Setting detail: OUTPATIENT SURGERY
Discharge: HOME | End: 2023-06-12
Attending: STUDENT IN AN ORGANIZED HEALTH CARE EDUCATION/TRAINING PROGRAM | Admitting: STUDENT IN AN ORGANIZED HEALTH CARE EDUCATION/TRAINING PROGRAM
Payer: MEDICARE

## 2023-06-12 VITALS
HEIGHT: 73 IN | HEART RATE: 51 BPM | OXYGEN SATURATION: 94 % | DIASTOLIC BLOOD PRESSURE: 76 MMHG | RESPIRATION RATE: 18 BRPM | SYSTOLIC BLOOD PRESSURE: 164 MMHG | TEMPERATURE: 97 F | WEIGHT: 175 LBS | BODY MASS INDEX: 23.19 KG/M2

## 2023-06-12 PROCEDURE — 3700000001 HC ADD 15 MINUTES (ANESTHESIA): Performed by: STUDENT IN AN ORGANIZED HEALTH CARE EDUCATION/TRAINING PROGRAM

## 2023-06-12 PROCEDURE — 2709999900 HC NON-CHARGEABLE SUPPLY: Performed by: STUDENT IN AN ORGANIZED HEALTH CARE EDUCATION/TRAINING PROGRAM

## 2023-06-12 PROCEDURE — 43244 EGD VARICES LIGATION: CPT | Performed by: STUDENT IN AN ORGANIZED HEALTH CARE EDUCATION/TRAINING PROGRAM

## 2023-06-12 PROCEDURE — 7100000011 HC PHASE II RECOVERY - ADDTL 15 MIN: Performed by: STUDENT IN AN ORGANIZED HEALTH CARE EDUCATION/TRAINING PROGRAM

## 2023-06-12 PROCEDURE — 7100000010 HC PHASE II RECOVERY - FIRST 15 MIN: Performed by: STUDENT IN AN ORGANIZED HEALTH CARE EDUCATION/TRAINING PROGRAM

## 2023-06-12 PROCEDURE — 3700000000 HC ANESTHESIA ATTENDED CARE: Performed by: STUDENT IN AN ORGANIZED HEALTH CARE EDUCATION/TRAINING PROGRAM

## 2023-06-12 PROCEDURE — 2720000010 HC SURG SUPPLY STERILE: Performed by: STUDENT IN AN ORGANIZED HEALTH CARE EDUCATION/TRAINING PROGRAM

## 2023-06-12 PROCEDURE — 3609012300 HC EGD BAND LIGATION ESOPHGEAL/GASTRIC VARICES: Performed by: STUDENT IN AN ORGANIZED HEALTH CARE EDUCATION/TRAINING PROGRAM

## 2023-06-12 RX ORDER — SODIUM CHLORIDE 9 MG/ML
INJECTION, SOLUTION INTRAVENOUS PRN
Status: DISCONTINUED | OUTPATIENT
Start: 2023-06-12 | End: 2023-06-12 | Stop reason: HOSPADM

## 2023-06-12 RX ORDER — SODIUM CHLORIDE 0.9 % (FLUSH) 0.9 %
5-40 SYRINGE (ML) INJECTION PRN
Status: DISCONTINUED | OUTPATIENT
Start: 2023-06-12 | End: 2023-06-12 | Stop reason: HOSPADM

## 2023-06-12 RX ORDER — OMEPRAZOLE 40 MG/1
40 CAPSULE, DELAYED RELEASE ORAL
Qty: 60 CAPSULE | Refills: 11 | Status: SHIPPED | OUTPATIENT
Start: 2023-06-12

## 2023-06-12 RX ORDER — SODIUM CHLORIDE 0.9 % (FLUSH) 0.9 %
5-40 SYRINGE (ML) INJECTION EVERY 12 HOURS SCHEDULED
Status: DISCONTINUED | OUTPATIENT
Start: 2023-06-12 | End: 2023-06-12 | Stop reason: HOSPADM

## 2023-06-12 RX ORDER — ONDANSETRON 2 MG/ML
4 INJECTION INTRAMUSCULAR; INTRAVENOUS EVERY 6 HOURS PRN
Status: DISCONTINUED | OUTPATIENT
Start: 2023-06-12 | End: 2023-06-12 | Stop reason: HOSPADM

## 2023-06-12 RX ORDER — ONDANSETRON 4 MG/1
4 TABLET, ORALLY DISINTEGRATING ORAL EVERY 8 HOURS PRN
Status: DISCONTINUED | OUTPATIENT
Start: 2023-06-12 | End: 2023-06-12 | Stop reason: HOSPADM

## 2023-06-12 ASSESSMENT — PAIN SCALES - GENERAL: PAINLEVEL_OUTOF10: 0

## 2023-06-12 ASSESSMENT — PAIN - FUNCTIONAL ASSESSMENT: PAIN_FUNCTIONAL_ASSESSMENT: 0-10

## 2023-06-12 NOTE — H&P
[facility] ([facility])   Gastroenterology, Hepatology, &  Advanced Endoscopy    Consult       ASSESSMENT AND PLAN:    82y/M w/ history of cirrhosis and esophageal varices who presents for variceal surveillance and therapy. PLAN:  EGD today        HISTORY OF PRESENT ILLNESS:      Mr. Gerardo Guillen is an 82y/M w/ history of cirrhosis c/b esophageal varices requiring banding earlier this year who presents for continued variceal surveillance and therapy. Past Medical History:        Diagnosis Date    Arthritis     Knees    Atrial fibrillation ([address]     Cancer ([address]     skin cancer; Prostate cancer     CHF (congestive heart failure) ([address]     Chronic obstructive pulmonary disease ([address] 07/20/2022    Cirrhosis of liver ([facility]     Gallstone pancreatitis     H/O esophagogastroduodenoscopy 02/2013    dr Dylon Vargas no Hpylori, or Howell's     H/O ETOH abuse     last drink 2005    [place] (hard of hearing)     Hypertension     Inguinal hernia     RIGHT    Pacemaker     REMOVED 2018 due to infection     Past Surgical History:        Procedure Laterality Date    [facility]  2018    removed dt infection    CATARACT REMOVAL Bilateral     CHOLECYSTECTOMY      COLONOSCOPY  06/11/2016    additional dates: 2003,02/28/2013    ENDOSCOPY, COLON, DIAGNOSTIC      ERCP      and ston removal 03/2007    EYE SURGERY Bilateral     cataracts    HIP SURGERY Left 04/24/2020    HIP HEMIARTHROPLASTY -- HILARY performed by Lucia Moore DO at [facility]      cardioversion    PACEMAKER PLACEMENT      LASER LEAD EXTRACTION  03/02/2018    SKIN BIOPSY      TONSILLECTOMY      UPPER GASTROINTESTINAL ENDOSCOPY N/A 2/13/2023    EGD BAND LIGATION performed by Hans Reyes MD at [facility] ENDOSCOPY     Social History:    TOBACCO:   reports that he has never smoked. He has never used smokeless tobacco.  ETOH:   reports no history of alcohol use.   DRUGS:   reports no history of drug

## 2023-06-12 NOTE — OP NOTE
Operative Note      Patient: Tawanna Forrester  YOB: 1940  MRN: [id number]    Date of Procedure: 6/12/2023    Pre-Op Diagnosis Codes:     * Esophageal varices ([facility] [I85.00]    Post-Op Diagnosis: {MH OR UBVI:450100662}       Procedure(s):  EGD BAND LIGATION    Surgeon(s):  Rahul Zavala MD    Assistant:   * No surgical staff found *    Anesthesia: Monitor Anesthesia Care    Estimated Blood Loss (mL): {NUMBERS; AVC:69258}    Complications: {Symptoms; Intra-op complications:70943}    Specimens:   * No specimens in log *    Implants:  * No implants in log *      Drains: * No LDAs found *    Findings:     Grade II w/ high risk features. Banding x 4 performed. Normal Z line. Moderate/severe portal hypertensive gastropathy.   Normal duodenum      Detailed Description of Procedure:   ***    Electronically signed by Rahul Zavala MD on 6/12/2023 at 12:54 PM

## 2023-06-12 NOTE — DISCHARGE INSTRUCTIONS
Recommendations:  -The patient will be observed post procedure until all discharge criteria are met. -Patient has a contact number available for emergencies. The signs and symptoms of potential delayed complications were discussed with the patient.    -Return to normal activities tomorrow. -Written discharge instructions were provided to the patient.    -Resume anticoagulation in 48 hours.  -Take Carafate PRN for post-banding pain. -Increase omeprazole to twice daily.   -Clinic appointment scheduled 12/6/23.

## 2023-06-27 PROBLEM — K40.90 UNILATERAL INGUINAL HERNIA WITHOUT OBSTRUCTION OR GANGRENE: Status: ACTIVE | Noted: 2023-06-27

## 2023-07-06 ENCOUNTER — OFFICE VISIT (OUTPATIENT)
Dept: FAMILY MEDICINE CLINIC | Age: 83
End: 2023-07-06
Payer: MEDICARE

## 2023-07-06 VITALS
OXYGEN SATURATION: 100 % | BODY MASS INDEX: 22.82 KG/M2 | HEART RATE: 52 BPM | SYSTOLIC BLOOD PRESSURE: 124 MMHG | WEIGHT: 172.2 LBS | RESPIRATION RATE: 18 BRPM | TEMPERATURE: 97 F | DIASTOLIC BLOOD PRESSURE: 72 MMHG | HEIGHT: 73 IN

## 2023-07-06 DIAGNOSIS — I48.0 PAROXYSMAL ATRIAL FIBRILLATION (HCC): Primary | ICD-10-CM

## 2023-07-06 DIAGNOSIS — N40.1 BENIGN PROSTATIC HYPERPLASIA WITH URINARY FREQUENCY: ICD-10-CM

## 2023-07-06 DIAGNOSIS — K74.60 ESOPHAGEAL VARICES IN CIRRHOSIS (HCC): ICD-10-CM

## 2023-07-06 DIAGNOSIS — R35.0 BENIGN PROSTATIC HYPERPLASIA WITH URINARY FREQUENCY: ICD-10-CM

## 2023-07-06 DIAGNOSIS — Z79.01 CHRONIC ANTICOAGULATION: Chronic | ICD-10-CM

## 2023-07-06 DIAGNOSIS — C61 PROSTATE CANCER (HCC): ICD-10-CM

## 2023-07-06 DIAGNOSIS — I50.32 CHRONIC DIASTOLIC HEART FAILURE (HCC): Chronic | ICD-10-CM

## 2023-07-06 DIAGNOSIS — I85.10 ESOPHAGEAL VARICES IN CIRRHOSIS (HCC): ICD-10-CM

## 2023-07-06 DIAGNOSIS — F09 COGNITIVE AND NEUROBEHAVIORAL DYSFUNCTION: ICD-10-CM

## 2023-07-06 PROCEDURE — 3078F DIAST BP <80 MM HG: CPT | Performed by: FAMILY MEDICINE

## 2023-07-06 PROCEDURE — 1123F ACP DISCUSS/DSCN MKR DOCD: CPT | Performed by: FAMILY MEDICINE

## 2023-07-06 PROCEDURE — 99214 OFFICE O/P EST MOD 30 MIN: CPT | Performed by: FAMILY MEDICINE

## 2023-07-06 PROCEDURE — 3074F SYST BP LT 130 MM HG: CPT | Performed by: FAMILY MEDICINE

## 2023-07-06 RX ORDER — TAMSULOSIN HYDROCHLORIDE 0.4 MG/1
0.4 CAPSULE ORAL DAILY
Qty: 30 CAPSULE | Refills: 5 | Status: SHIPPED | OUTPATIENT
Start: 2023-07-06

## 2023-07-06 RX ORDER — URSODIOL 300 MG/1
300 CAPSULE ORAL 2 TIMES DAILY
Qty: 180 CAPSULE | Refills: 1 | Status: SHIPPED | OUTPATIENT
Start: 2023-07-06

## 2023-07-06 SDOH — ECONOMIC STABILITY: INCOME INSECURITY: HOW HARD IS IT FOR YOU TO PAY FOR THE VERY BASICS LIKE FOOD, HOUSING, MEDICAL CARE, AND HEATING?: NOT HARD AT ALL

## 2023-07-06 SDOH — ECONOMIC STABILITY: HOUSING INSECURITY
IN THE LAST 12 MONTHS, WAS THERE A TIME WHEN YOU DID NOT HAVE A STEADY PLACE TO SLEEP OR SLEPT IN A SHELTER (INCLUDING NOW)?: NO

## 2023-07-06 SDOH — ECONOMIC STABILITY: FOOD INSECURITY: WITHIN THE PAST 12 MONTHS, THE FOOD YOU BOUGHT JUST DIDN'T LAST AND YOU DIDN'T HAVE MONEY TO GET MORE.: NEVER TRUE

## 2023-07-06 SDOH — ECONOMIC STABILITY: FOOD INSECURITY: WITHIN THE PAST 12 MONTHS, YOU WORRIED THAT YOUR FOOD WOULD RUN OUT BEFORE YOU GOT MONEY TO BUY MORE.: NEVER TRUE

## 2023-07-06 ASSESSMENT — ENCOUNTER SYMPTOMS
BLOOD IN STOOL: 0
CONSTIPATION: 0
PHOTOPHOBIA: 0
SHORTNESS OF BREATH: 0
EYE REDNESS: 0
COUGH: 0
SINUS PAIN: 0
DIARRHEA: 0
SORE THROAT: 0
TROUBLE SWALLOWING: 0
WHEEZING: 0
BACK PAIN: 0
VOMITING: 0
ABDOMINAL PAIN: 0

## 2023-07-06 NOTE — PROGRESS NOTES
OFFICE NOTE    7/6/23  Name: Jimy Carey  WDR:[id number]   Sex:male   Age:82 y.o. SUBJECTIVE  Chief Complaint   Patient presents with    Hernia     Around the groin area       HPI comes in to discuss. Pt initially saw Dr. Joseph Cabrera for Cardiac Clearance and Dr. Miguel Hernandez for GI clearance for his [facility] repair. Cardiology gave the ok but GI felt 90 day mortality risk was 25% which seems unusually high to me. Kristal believes the surgery is on indefinite hold. She says Mejia Mays spends most of his day in recliner and c/o of hernia pain whenever he stands, sits upright or tries to do anything    Review of Systems   Constitutional:  Positive for activity change and fatigue. Negative for appetite change, fever and unexpected weight change. HENT:  Negative for congestion, ear pain, hearing loss, sinus pain, sore throat and trouble swallowing. Eyes:  Negative for photophobia, redness and visual disturbance. Respiratory:  Negative for cough, shortness of breath and wheezing. Cardiovascular:  Negative for chest pain, palpitations and leg swelling. Gastrointestinal:  Negative for abdominal pain, blood in stool, constipation, diarrhea and vomiting. Endocrine: Negative for cold intolerance, polydipsia and polyuria. Genitourinary:  Positive for frequency and urgency. Negative for difficulty urinating, dysuria, genital sores and hematuria. Musculoskeletal:  Positive for arthralgias. Negative for back pain and joint swelling. Skin:  Negative for pallor and rash. Allergic/Immunologic: Negative for food allergies. Neurological:  Positive for weakness. Negative for dizziness, tremors, seizures, syncope, numbness and headaches. Hematological:  Negative for adenopathy. Does not bruise/bleed easily. Psychiatric/Behavioral:  Positive for dysphoric mood. Negative for agitation, hallucinations and sleep disturbance. The patient is nervous/anxious.            Current Outpatient Medications:     potassium bicarbonate

## 2023-07-07 ENCOUNTER — TELEPHONE (OUTPATIENT)
Dept: SURGERY | Age: 83
End: 2023-07-07

## 2023-07-07 DIAGNOSIS — F32.89 OTHER DEPRESSION: Primary | ICD-10-CM

## 2023-07-07 RX ORDER — BUPROPION HYDROCHLORIDE 150 MG/1
150 TABLET, EXTENDED RELEASE ORAL DAILY
Qty: 30 TABLET | Refills: 5 | Status: SHIPPED | OUTPATIENT
Start: 2023-07-07

## 2023-07-07 NOTE — TELEPHONE ENCOUNTER
MA informed patient's wife that Dr Veronica Graham spoke with Dr Arianne Mandujano who wants patient to be treated for depression prior to any surgery. Patient's wife was confused because she stated that Dr Arianne Mandujano did not address the depression at [address] visit. MA did make patient a 6 week follow up appointment and patient's wife will contact Dr Arianne Mandujano' office.   Electronically signed by Caridad Braun MA on 7/7/2023 at 4:22 PM

## 2023-07-26 ENCOUNTER — PREP FOR PROCEDURE (OUTPATIENT)
Dept: GASTROENTEROLOGY | Age: 83
End: 2023-07-26

## 2023-07-26 ENCOUNTER — TELEPHONE (OUTPATIENT)
Dept: GASTROENTEROLOGY | Age: 83
End: 2023-07-26

## 2023-07-26 NOTE — TELEPHONE ENCOUNTER
Prior Authorization Form:      DEMOGRAPHICS:                     Patient Name:  Amanuel Oswald  Patient :  1940            Insurance:  Payor: Flaco Soler / Plan: Shania Chen PPO / Product Type: Medicare /   Insurance ID Number:    Payer/Plan Subscr  Sex Relation Sub. Ins. ID Effective Group Num   1.  Flaco SolerRex Manus 1940 Male Self [id number]                                   [id number]         DIAGNOSIS & PROCEDURE:                       Procedure/Operation: EGD w/ banding           CPT Code: 26904    Diagnosis:  Esophageal Varices    ICD10 Code: I85.00    Location:  [facility]    Surgeon:  Dr. Rose Chu     SCHEDULING INFORMATION:                          Date: 2023    Time: 1430              Anesthesia:  MAC/TIVA                                                       Status:  Outpatient            Electronically signed by Jessica Duarte MA on 2023 at 10:58 AM

## 2023-08-08 ENCOUNTER — OFFICE VISIT (OUTPATIENT)
Dept: FAMILY MEDICINE CLINIC | Age: 83
End: 2023-08-08
Payer: MEDICARE

## 2023-08-08 VITALS
WEIGHT: 163.4 LBS | BODY MASS INDEX: 21.66 KG/M2 | OXYGEN SATURATION: 96 % | HEIGHT: 73 IN | SYSTOLIC BLOOD PRESSURE: 118 MMHG | HEART RATE: 45 BPM | TEMPERATURE: 97.4 F | RESPIRATION RATE: 17 BRPM | DIASTOLIC BLOOD PRESSURE: 76 MMHG

## 2023-08-08 DIAGNOSIS — F33.0 MAJOR DEPRESSIVE DISORDER, RECURRENT, MILD (HCC): ICD-10-CM

## 2023-08-08 DIAGNOSIS — I85.10 ESOPHAGEAL VARICES IN CIRRHOSIS (HCC): Primary | ICD-10-CM

## 2023-08-08 DIAGNOSIS — I50.32 CHRONIC DIASTOLIC HEART FAILURE (HCC): Chronic | ICD-10-CM

## 2023-08-08 DIAGNOSIS — K74.60 ESOPHAGEAL VARICES IN CIRRHOSIS (HCC): Primary | ICD-10-CM

## 2023-08-08 DIAGNOSIS — K40.90 HERNIA, INGUINAL, RIGHT: ICD-10-CM

## 2023-08-08 PROCEDURE — 99214 OFFICE O/P EST MOD 30 MIN: CPT | Performed by: FAMILY MEDICINE

## 2023-08-08 PROCEDURE — 3074F SYST BP LT 130 MM HG: CPT | Performed by: FAMILY MEDICINE

## 2023-08-08 PROCEDURE — 1123F ACP DISCUSS/DSCN MKR DOCD: CPT | Performed by: FAMILY MEDICINE

## 2023-08-08 PROCEDURE — 3078F DIAST BP <80 MM HG: CPT | Performed by: FAMILY MEDICINE

## 2023-08-08 ASSESSMENT — ENCOUNTER SYMPTOMS
WHEEZING: 0
BLOOD IN STOOL: 0
BACK PAIN: 1
PHOTOPHOBIA: 0
COUGH: 0
ABDOMINAL DISTENTION: 0
SHORTNESS OF BREATH: 0
ABDOMINAL PAIN: 0
EYE REDNESS: 0

## 2023-08-08 NOTE — PROGRESS NOTES
OFFICE NOTE    8/8/23  Name: Stanton Day  AEW:1/14/[birth year]   Sex:male   Age:83 y.o. SUBJECTIVE  Chief Complaint   Patient presents with    Discuss Medications       HPI Was seen last time and started on Wellbutrin, has apt with Dr. Kalyan Pedroza in about a month. Had EGD in august, got notice about one coming up next week but believes this to be a mistake as was advised would not need to be seen for 6 mos    Review of Systems   Constitutional:  Positive for activity change and fatigue. Negative for unexpected weight change. HENT:  Positive for congestion, hearing loss and postnasal drip. Eyes:  Negative for photophobia, redness and visual disturbance. Respiratory:  Negative for cough, shortness of breath and wheezing. Cardiovascular:  Positive for leg swelling. Negative for chest pain and palpitations. Gastrointestinal:  Negative for abdominal distention, abdominal pain and blood in stool. Genitourinary:  Positive for frequency and urgency. Negative for dysuria and hematuria. Musculoskeletal:  Positive for arthralgias and back pain. Skin:  Negative for pallor and rash. Neurological:  Positive for weakness. Negative for tremors, seizures, syncope and numbness. Psychiatric/Behavioral:  Positive for dysphoric mood. Negative for confusion and sleep disturbance. The patient is not nervous/anxious. All other systems reviewed and are negative.          Current Outpatient Medications:     buPROPion (WELLBUTRIN SR) 150 MG extended release tablet, Take 1 tablet by mouth daily, Disp: 30 tablet, Rfl: 5    potassium bicarbonate (EFFER-K) 25 MEQ disintegrating tablet, Take 1 tablet by mouth every other day, Disp: 45 tablet, Rfl: 1    ursodiol (ACTIGALL) 300 MG capsule, Take 1 capsule by mouth 2 times daily, Disp: 180 capsule, Rfl: 1    tamsulosin (FLOMAX) 0.4 MG capsule, Take 1 capsule by mouth daily, Disp: 30 capsule, Rfl: 5    apixaban (ELIQUIS) 2.5 MG TABS tablet, Take 1 tablet by mouth 2 times daily,

## 2023-09-08 ENCOUNTER — OFFICE VISIT (OUTPATIENT)
Dept: SURGERY | Age: 83
End: 2023-09-08
Payer: MEDICARE

## 2023-09-08 VITALS
WEIGHT: 164 LBS | HEART RATE: 53 BPM | SYSTOLIC BLOOD PRESSURE: 106 MMHG | TEMPERATURE: 98.6 F | BODY MASS INDEX: 21.64 KG/M2 | OXYGEN SATURATION: 95 % | DIASTOLIC BLOOD PRESSURE: 78 MMHG

## 2023-09-08 DIAGNOSIS — K40.90 INGUINAL HERNIA, RIGHT: Primary | ICD-10-CM

## 2023-09-08 PROCEDURE — 1123F ACP DISCUSS/DSCN MKR DOCD: CPT | Performed by: SURGERY

## 2023-09-08 PROCEDURE — 3074F SYST BP LT 130 MM HG: CPT | Performed by: SURGERY

## 2023-09-08 PROCEDURE — 99214 OFFICE O/P EST MOD 30 MIN: CPT | Performed by: SURGERY

## 2023-09-08 PROCEDURE — 3078F DIAST BP <80 MM HG: CPT | Performed by: SURGERY

## 2023-09-13 ENCOUNTER — PREP FOR PROCEDURE (OUTPATIENT)
Dept: SURGERY | Age: 83
End: 2023-09-13

## 2023-09-14 ENCOUNTER — TELEPHONE (OUTPATIENT)
Dept: SURGERY | Age: 83
End: 2023-09-14

## 2023-09-14 PROBLEM — K40.90 INGUINAL HERNIA: Status: ACTIVE | Noted: 2023-09-13

## 2023-09-14 NOTE — TELEPHONE ENCOUNTER
Prior Authorization Form:      DEMOGRAPHICS:                     Patient Name:  Odell Edmonds  Patient :  1940            Insurance:  Payor: Laine Alyiff / Plan: Toshia Pinedo PPO / Product Type: Medicare /   Insurance ID Number:    Payer/Plan Subscr  Sex Relation Sub. Ins. ID Effective Group Num   1.  Laine Edmonds 1940 Male Self [id number] 20 [id number]                                   [address]         DIAGNOSIS & PROCEDURE:                       Procedure/Operation: Laparoscopic robotic XI assisted rt inguinal hernia repair with mesh           CPT Code: 69503    Diagnosis:  Rt inguinal hernia    ICD10 Code: K40.90    Location:  [facility]    Surgeon:  Dr Shayla Smallwood INFORMATION:                          Date: 10-23-23    Time: 1:15 pm              Anesthesia:  General                                                       Status:  Outpatient        Special Comments:         Electronically signed by Louisa Murillo MA on 2023 at 9:51 AM

## 2023-09-14 NOTE — TELEPHONE ENCOUNTER
Brenda Alcaraz is scheduled for laparoscopic robotic XI assisted rt inguinal hernia repair with mesh with Dr Briana Andre on 10-23-23 at [facility]. Patient needs to be NPO after midnight the night before procedure. All surgery instructions were explained to the patient and a surgery letter was also mailed out. MA informed patient that PAT will also be calling to review pre-op instructions and medications. Patient verbalized understanding.   Electronically signed by Dickey Litten, MA on 9/14/2023 at 9:50 AM

## 2023-10-07 ENCOUNTER — OFFICE VISIT (OUTPATIENT)
Dept: FAMILY MEDICINE CLINIC | Age: 83
End: 2023-10-07

## 2023-10-07 VITALS
RESPIRATION RATE: 20 BRPM | SYSTOLIC BLOOD PRESSURE: 96 MMHG | DIASTOLIC BLOOD PRESSURE: 54 MMHG | BODY MASS INDEX: 21.64 KG/M2 | HEIGHT: 73 IN | HEART RATE: 40 BPM | TEMPERATURE: 97.6 F | OXYGEN SATURATION: 90 %

## 2023-10-07 DIAGNOSIS — B02.9 HERPES ZOSTER WITHOUT COMPLICATION: Primary | ICD-10-CM

## 2023-10-07 RX ORDER — FAMCICLOVIR 500 MG/1
500 TABLET ORAL 3 TIMES DAILY
Qty: 21 TABLET | Refills: 0 | Status: SHIPPED | OUTPATIENT
Start: 2023-10-07 | End: 2023-10-14

## 2023-10-07 RX ORDER — LACTULOSE 10 G/15ML
10 SOLUTION ORAL PRN
Qty: 1000 ML | Refills: 5 | Status: SHIPPED | OUTPATIENT
Start: 2023-10-07

## 2023-10-07 NOTE — PROGRESS NOTES
OFFICE NOTE    10/7/23  Name: Haroldo Cheema  JYE:3/76/7878   Sex:male   Age:83 y.o. SUBJECTIVE  Chief Complaint   Patient presents with    Rash     On back x 1 week - very painful and itchy       HPI comes in with painful rash on back left side. Has not had overt CHF symptoms for a while but feels. Week.  On schedule for [facility] repair    Review of Systems   Rash was preceeded by pain or soreness for a few days  No orthopnea, chest pain, syncope, rectal bleeding    Current Outpatient Medications:     lactulose (CHRONULAC) 10 GM/15ML solution, Take 15 mLs by mouth as needed (constipation), Disp: 1000 mL, Rfl: 5    famciclovir (FAMVIR) 500 MG tablet, Take 1 tablet by mouth 3 times daily for 7 days, Disp: 21 tablet, Rfl: 0    Lidocaine 4 % GEL, Apply 1 Application topically in the morning and at bedtime, Disp: 80 g, Rfl: 1    buPROPion (WELLBUTRIN SR) 150 MG extended release tablet, Take 1 tablet by mouth daily, Disp: 30 tablet, Rfl: 5    potassium bicarbonate (EFFER-K) 25 MEQ disintegrating tablet, Take 1 tablet by mouth every other day, Disp: 45 tablet, Rfl: 1    ursodiol (ACTIGALL) 300 MG capsule, Take 1 capsule by mouth 2 times daily, Disp: 180 capsule, Rfl: 1    tamsulosin (FLOMAX) 0.4 MG capsule, Take 1 capsule by mouth daily, Disp: 30 capsule, Rfl: 5    apixaban (ELIQUIS) 2.5 MG TABS tablet, Take 1 tablet by mouth 2 times daily, Disp: 60 tablet, Rfl: 3    omeprazole (PRILOSEC) 40 MG delayed release capsule, Take 1 capsule by mouth 2 times daily (before meals), Disp: 60 capsule, Rfl: 11    nadolol (CORGARD) 20 MG tablet, Take 1 tablet by mouth daily, Disp: 30 tablet, Rfl: 11    sucralfate (CARAFATE) 1 GM/10ML suspension, Take 10 mLs by mouth 4 times daily as needed (epigastric discomfort, post-banding pain), Disp: 1200 mL, Rfl: 3    acetaminophen (TYLENOL) 500 MG tablet, Take 1 tablet by mouth every 6 hours as needed for Pain, Disp: , Rfl:     spironolactone (ALDACTONE) 100 MG tablet, Take 0.5 tablets by mouth

## 2023-10-16 ENCOUNTER — TELEPHONE (OUTPATIENT)
Dept: FAMILY MEDICINE CLINIC | Age: 83
End: 2023-10-16

## 2023-10-16 DIAGNOSIS — K74.60 CIRRHOSIS OF LIVER WITHOUT ASCITES, UNSPECIFIED HEPATIC CIRRHOSIS TYPE (HCC): Primary | ICD-10-CM

## 2023-10-16 RX ORDER — SPIRONOLACTONE 100 MG/1
TABLET, FILM COATED ORAL
Qty: 30 TABLET | Refills: 0 | OUTPATIENT
Start: 2023-10-16

## 2023-10-16 RX ORDER — SPIRONOLACTONE 100 MG/1
50 TABLET, FILM COATED ORAL DAILY
Qty: 30 TABLET | Refills: 5 | Status: SHIPPED | OUTPATIENT
Start: 2023-10-16

## 2023-10-16 NOTE — TELEPHONE ENCOUNTER
June - Wife        She is asking for a new script for Spironolactone to be sent to [facility] in [place]. I have this ready to send.       Last Appointment:  10/7/2023  Future Appointments   Date Time Provider [address]   11/6/2023  8:45 AM Marvin Gallo MD [facility]   11/10/2023 10:30 AM Sharon Wetzel MD COL [facility]   12/6/2023  1:00 PM Nubia Lopez MD [address] [place] [address]

## 2023-10-17 NOTE — TELEPHONE ENCOUNTER
Last Appointment:  10/7/2023  Future Appointments   Date Time Provider [address]   11/6/2023  8:45 AM [facility]   11/10/2023 10:30 AM Remi Cochran MD COL SURG [facility]   12/6/2023  1:00 PM Crow Lopez MD [address] [place] [address]

## 2023-10-18 RX ORDER — APIXABAN 2.5 MG/1
2.5 TABLET, FILM COATED ORAL 2 TIMES DAILY
Qty: 180 TABLET | Refills: 1 | Status: SHIPPED | OUTPATIENT
Start: 2023-10-18

## 2023-10-18 NOTE — PROGRESS NOTES
[address] PRE-ADMISSION TESTING INSTRUCTIONS    The Preadmission Testing patient is instructed accordingly using the following criteria (check applicable):    ARRIVAL INSTRUCTIONS:  [x] Parking the day of Surgery is located in the Main Entrance lot. Upon entering the door, make an immediate right to the surgery reception desk    [x] Bring photo ID and insurance card    [x] Bring in a copy of Living will or Durable Power of  papers. [x] Please be sure to arrange for responsible adult to provide transportation to and from the hospital    [x] Please arrange for responsible adult to be with you for the 24 hour period post procedure due to having anesthesia    [x] If you awake am of surgery not feeling well or have temperature >100 please call [phone]    GENERAL INSTRUCTIONS:    [x] Nothing by mouth after midnight, including gum, candy, mints or water    [x] You may brush your teeth, but do not swallow any water    [x] Take medications as instructed with 1-2 oz of water    [x] Stop herbal supplements and vitamins 5 days prior to procedure    [] Follow preop dosing of blood thinners per physician instructions    [] Take 1/2 dose of evening insulin, but no insulin after midnight    [] No oral diabetic medications after midnight    [] If diabetic and have low blood sugar or feel symptomatic, take 1-2oz apple juice only    [] Bring inhalers day of surgery    [] Bring C-PAP/ Bi-Pap day of surgery    [] Bring urine specimen day of surgery    [x] Shower or bath with soap, lather and rinse well, AM of Surgery, no lotion, powders or creams to surgical site    [] Follow bowel prep as instructed per surgeon    [x] No tobacco products within 24 hours of surgery     [x] No alcohol or illegal drug use within 24 hours of surgery.     [x] Jewelry, body piercing's, eyeglasses, contact lenses and dentures are not permitted into surgery (bring cases)      [x] Please do not wear any nail polish,

## 2023-10-20 ENCOUNTER — ANESTHESIA EVENT (OUTPATIENT)
Dept: OPERATING ROOM | Age: 83
End: 2023-10-20
Payer: MEDICARE

## 2023-10-23 ENCOUNTER — HOSPITAL ENCOUNTER (OUTPATIENT)
Age: 83
Setting detail: OUTPATIENT SURGERY
Discharge: HOME | End: 2023-10-23
Attending: SURGERY | Admitting: SURGERY
Payer: MEDICARE

## 2023-10-23 ENCOUNTER — ANESTHESIA (OUTPATIENT)
Dept: OPERATING ROOM | Age: 83
End: 2023-10-23
Payer: MEDICARE

## 2023-10-23 VITALS
RESPIRATION RATE: 16 BRPM | BODY MASS INDEX: 21.2 KG/M2 | TEMPERATURE: 97.6 F | DIASTOLIC BLOOD PRESSURE: 60 MMHG | WEIGHT: 160 LBS | OXYGEN SATURATION: 99 % | HEART RATE: 47 BPM | HEIGHT: 73 IN | SYSTOLIC BLOOD PRESSURE: 131 MMHG

## 2023-10-23 DIAGNOSIS — K40.90 UNILATERAL INGUINAL HERNIA WITHOUT OBSTRUCTION OR GANGRENE, RECURRENCE NOT SPECIFIED: Primary | ICD-10-CM

## 2023-10-23 LAB
ALBUMIN SERPL-MCNC: 3.4 G/DL (ref 3.5–5.2)
ALP SERPL-CCNC: 355 U/L (ref 40–129)
ALT SERPL-CCNC: 30 U/L (ref 0–40)
ANION GAP SERPL CALCULATED.3IONS-SCNC: 9 MMOL/L (ref 7–16)
AST SERPL-CCNC: 37 U/L (ref 0–39)
BILIRUB DIRECT SERPL-MCNC: 0.7 MG/DL (ref 0–0.3)
BILIRUB INDIRECT SERPL-MCNC: 0.9 MG/DL (ref 0–1)
BILIRUB SERPL-MCNC: 1.6 MG/DL (ref 0–1.2)
BUN SERPL-MCNC: 33 MG/DL (ref 6–23)
CALCIUM SERPL-MCNC: 8.8 MG/DL (ref 8.6–10.2)
CHLORIDE SERPL-SCNC: 105 MMOL/L (ref 98–107)
CO2 SERPL-SCNC: 27 MMOL/L (ref 22–29)
CREAT SERPL-MCNC: 1.7 MG/DL (ref 0.7–1.2)
ERYTHROCYTE [DISTWIDTH] IN BLOOD BY AUTOMATED COUNT: 16.8 % (ref 11.5–15)
GFR SERPL CREATININE-BSD FRML MDRD: 40 ML/MIN/1.73M2
GLUCOSE SERPL-MCNC: 85 MG/DL (ref 74–99)
HCT VFR BLD AUTO: 36 % (ref 37–54)
HGB BLD-MCNC: 11 G/DL (ref 12.5–16.5)
INR PPP: 1.1
MCH RBC QN AUTO: 28.9 PG (ref 26–35)
MCHC RBC AUTO-ENTMCNC: 30.6 G/DL (ref 32–34.5)
MCV RBC AUTO: 94.5 FL (ref 80–99.9)
PLATELET, FLUORESCENCE: 110 K/UL (ref 130–450)
PMV BLD AUTO: 10.1 FL (ref 7–12)
POTASSIUM SERPL-SCNC: 4.4 MMOL/L (ref 3.5–5)
PROT SERPL-MCNC: 7.3 G/DL (ref 6.4–8.3)
PROTHROMBIN TIME: 12.4 SEC (ref 9.3–12.4)
RBC # BLD AUTO: 3.81 M/UL (ref 3.8–5.8)
SODIUM SERPL-SCNC: 141 MMOL/L (ref 132–146)
WBC OTHER # BLD: 3.3 K/UL (ref 4.5–11.5)

## 2023-10-23 PROCEDURE — 49650 LAP ING HERNIA REPAIR INIT: CPT | Performed by: SURGERY

## 2023-10-23 PROCEDURE — 2580000003 HC RX 258: Performed by: SURGERY

## 2023-10-23 PROCEDURE — 7100000011 HC PHASE II RECOVERY - ADDTL 15 MIN: Performed by: SURGERY

## 2023-10-23 PROCEDURE — 85610 PROTHROMBIN TIME: CPT

## 2023-10-23 PROCEDURE — 2580000003 HC RX 258: Performed by: NURSE ANESTHETIST, CERTIFIED REGISTERED

## 2023-10-23 PROCEDURE — 7100000010 HC PHASE II RECOVERY - FIRST 15 MIN: Performed by: SURGERY

## 2023-10-23 PROCEDURE — 6360000002 HC RX W HCPCS: Performed by: NURSE ANESTHETIST, CERTIFIED REGISTERED

## 2023-10-23 PROCEDURE — 3700000001 HC ADD 15 MINUTES (ANESTHESIA): Performed by: SURGERY

## 2023-10-23 PROCEDURE — 7100000001 HC PACU RECOVERY - ADDTL 15 MIN: Performed by: SURGERY

## 2023-10-23 PROCEDURE — 80053 COMPREHEN METABOLIC PANEL: CPT

## 2023-10-23 PROCEDURE — 85027 COMPLETE CBC AUTOMATED: CPT

## 2023-10-23 PROCEDURE — 82248 BILIRUBIN DIRECT: CPT

## 2023-10-23 PROCEDURE — 3600000019 HC SURGERY ROBOT ADDTL 15MIN: Performed by: SURGERY

## 2023-10-23 PROCEDURE — 2709999900 HC NON-CHARGEABLE SUPPLY: Performed by: SURGERY

## 2023-10-23 PROCEDURE — 7100000000 HC PACU RECOVERY - FIRST 15 MIN: Performed by: SURGERY

## 2023-10-23 PROCEDURE — S2900 ROBOTIC SURGICAL SYSTEM: HCPCS | Performed by: SURGERY

## 2023-10-23 PROCEDURE — 3600000009 HC SURGERY ROBOT BASE: Performed by: SURGERY

## 2023-10-23 PROCEDURE — 6370000000 HC RX 637 (ALT 250 FOR IP): Performed by: ANESTHESIOLOGY

## 2023-10-23 PROCEDURE — C1781 MESH (IMPLANTABLE): HCPCS | Performed by: SURGERY

## 2023-10-23 PROCEDURE — 3700000000 HC ANESTHESIA ATTENDED CARE: Performed by: SURGERY

## 2023-10-23 PROCEDURE — 6360000002 HC RX W HCPCS: Performed by: SURGERY

## 2023-10-23 PROCEDURE — 2500000003 HC RX 250 WO HCPCS: Performed by: NURSE ANESTHETIST, CERTIFIED REGISTERED

## 2023-10-23 DEVICE — LAPAROSCOPIC SELF-FIXATING MESH, RIGHT ANATOMICAL
Type: IMPLANTABLE DEVICE | Site: INGUINAL | Status: FUNCTIONAL
Brand: PROGRIP

## 2023-10-23 RX ORDER — PROPOFOL 10 MG/ML
INJECTION, EMULSION INTRAVENOUS PRN
Status: DISCONTINUED | OUTPATIENT
Start: 2023-10-23 | End: 2023-10-23 | Stop reason: SDUPTHER

## 2023-10-23 RX ORDER — ONDANSETRON 2 MG/ML
INJECTION INTRAMUSCULAR; INTRAVENOUS PRN
Status: DISCONTINUED | OUTPATIENT
Start: 2023-10-23 | End: 2023-10-23 | Stop reason: SDUPTHER

## 2023-10-23 RX ORDER — SODIUM CHLORIDE 9 MG/ML
INJECTION, SOLUTION INTRAVENOUS PRN
Status: DISCONTINUED | OUTPATIENT
Start: 2023-10-23 | End: 2023-10-23 | Stop reason: HOSPADM

## 2023-10-23 RX ORDER — SODIUM CHLORIDE 9 MG/ML
INJECTION, SOLUTION INTRAVENOUS CONTINUOUS PRN
Status: DISCONTINUED | OUTPATIENT
Start: 2023-10-23 | End: 2023-10-23 | Stop reason: SDUPTHER

## 2023-10-23 RX ORDER — FENTANYL CITRATE 50 UG/ML
INJECTION, SOLUTION INTRAMUSCULAR; INTRAVENOUS PRN
Status: DISCONTINUED | OUTPATIENT
Start: 2023-10-23 | End: 2023-10-23 | Stop reason: SDUPTHER

## 2023-10-23 RX ORDER — DEXAMETHASONE SODIUM PHOSPHATE 4 MG/ML
INJECTION, SOLUTION INTRA-ARTICULAR; INTRALESIONAL; INTRAMUSCULAR; INTRAVENOUS; SOFT TISSUE PRN
Status: DISCONTINUED | OUTPATIENT
Start: 2023-10-23 | End: 2023-10-23 | Stop reason: SDUPTHER

## 2023-10-23 RX ORDER — OXYCODONE HYDROCHLORIDE AND ACETAMINOPHEN 5; 325 MG/1; MG/1
1 TABLET ORAL EVERY 6 HOURS PRN
Qty: 12 TABLET | Refills: 0 | Status: SHIPPED | OUTPATIENT
Start: 2023-10-23 | End: 2023-10-26

## 2023-10-23 RX ORDER — PROCHLORPERAZINE EDISYLATE 5 MG/ML
5 INJECTION INTRAMUSCULAR; INTRAVENOUS
Status: DISCONTINUED | OUTPATIENT
Start: 2023-10-23 | End: 2023-10-23 | Stop reason: HOSPADM

## 2023-10-23 RX ORDER — SODIUM CHLORIDE 0.9 % (FLUSH) 0.9 %
5-40 SYRINGE (ML) INJECTION PRN
Status: DISCONTINUED | OUTPATIENT
Start: 2023-10-23 | End: 2023-10-23 | Stop reason: HOSPADM

## 2023-10-23 RX ORDER — ROCURONIUM BROMIDE 10 MG/ML
INJECTION, SOLUTION INTRAVENOUS PRN
Status: DISCONTINUED | OUTPATIENT
Start: 2023-10-23 | End: 2023-10-23 | Stop reason: SDUPTHER

## 2023-10-23 RX ORDER — OXYCODONE HYDROCHLORIDE AND ACETAMINOPHEN 5; 325 MG/1; MG/1
1 TABLET ORAL
Status: COMPLETED | OUTPATIENT
Start: 2023-10-23 | End: 2023-10-23

## 2023-10-23 RX ORDER — SODIUM CHLORIDE 0.9 % (FLUSH) 0.9 %
5-40 SYRINGE (ML) INJECTION EVERY 12 HOURS SCHEDULED
Status: DISCONTINUED | OUTPATIENT
Start: 2023-10-23 | End: 2023-10-23 | Stop reason: HOSPADM

## 2023-10-23 RX ORDER — IPRATROPIUM BROMIDE AND ALBUTEROL SULFATE 2.5; .5 MG/3ML; MG/3ML
1 SOLUTION RESPIRATORY (INHALATION)
Status: DISCONTINUED | OUTPATIENT
Start: 2023-10-23 | End: 2023-10-23 | Stop reason: HOSPADM

## 2023-10-23 RX ORDER — DEXTROSE MONOHYDRATE 100 MG/ML
INJECTION, SOLUTION INTRAVENOUS CONTINUOUS PRN
Status: DISCONTINUED | OUTPATIENT
Start: 2023-10-23 | End: 2023-10-23 | Stop reason: HOSPADM

## 2023-10-23 RX ORDER — EPHEDRINE SULFATE/0.9% NACL/PF 50 MG/5 ML
SYRINGE (ML) INTRAVENOUS PRN
Status: DISCONTINUED | OUTPATIENT
Start: 2023-10-23 | End: 2023-10-23 | Stop reason: SDUPTHER

## 2023-10-23 RX ORDER — ONDANSETRON 2 MG/ML
4 INJECTION INTRAMUSCULAR; INTRAVENOUS
Status: DISCONTINUED | OUTPATIENT
Start: 2023-10-23 | End: 2023-10-23 | Stop reason: HOSPADM

## 2023-10-23 RX ORDER — LIDOCAINE HYDROCHLORIDE 20 MG/ML
INJECTION, SOLUTION EPIDURAL; INFILTRATION; INTRACAUDAL; PERINEURAL PRN
Status: DISCONTINUED | OUTPATIENT
Start: 2023-10-23 | End: 2023-10-23 | Stop reason: SDUPTHER

## 2023-10-23 RX ADMIN — ONDANSETRON 4 MG: 2 INJECTION INTRAMUSCULAR; INTRAVENOUS at 12:13

## 2023-10-23 RX ADMIN — LIDOCAINE HYDROCHLORIDE 80 MG: 20 INJECTION, SOLUTION EPIDURAL; INFILTRATION; INTRACAUDAL; PERINEURAL at 11:14

## 2023-10-23 RX ADMIN — ROCURONIUM BROMIDE 50 MG: 10 INJECTION, SOLUTION INTRAVENOUS at 11:14

## 2023-10-23 RX ADMIN — SODIUM CHLORIDE: 9 INJECTION, SOLUTION INTRAVENOUS at 11:07

## 2023-10-23 RX ADMIN — FENTANYL CITRATE 25 MCG: 50 INJECTION, SOLUTION INTRAMUSCULAR; INTRAVENOUS at 12:21

## 2023-10-23 RX ADMIN — OXYCODONE AND ACETAMINOPHEN 1 TABLET: 5; 325 TABLET ORAL at 13:36

## 2023-10-23 RX ADMIN — WATER 2000 MG: 1 INJECTION INTRAMUSCULAR; INTRAVENOUS; SUBCUTANEOUS at 11:21

## 2023-10-23 RX ADMIN — DEXAMETHASONE SODIUM PHOSPHATE 10 MG: 4 INJECTION, SOLUTION INTRAMUSCULAR; INTRAVENOUS at 11:22

## 2023-10-23 RX ADMIN — Medication 10 MG: at 11:30

## 2023-10-23 RX ADMIN — FENTANYL CITRATE 50 MCG: 50 INJECTION, SOLUTION INTRAMUSCULAR; INTRAVENOUS at 11:14

## 2023-10-23 RX ADMIN — PROPOFOL 120 MG: 10 INJECTION, EMULSION INTRAVENOUS at 11:14

## 2023-10-23 RX ADMIN — FENTANYL CITRATE 25 MCG: 50 INJECTION, SOLUTION INTRAMUSCULAR; INTRAVENOUS at 12:26

## 2023-10-23 RX ADMIN — SUGAMMADEX 500 MG: 100 INJECTION, SOLUTION INTRAVENOUS at 12:18

## 2023-10-23 ASSESSMENT — PAIN DESCRIPTION - PAIN TYPE
TYPE: ACUTE PAIN;SURGICAL PAIN
TYPE: SURGICAL PAIN
TYPE: ACUTE PAIN;SURGICAL PAIN

## 2023-10-23 ASSESSMENT — PAIN DESCRIPTION - LOCATION
LOCATION: ABDOMEN

## 2023-10-23 ASSESSMENT — PAIN SCALES - GENERAL
PAINLEVEL_OUTOF10: 6
PAINLEVEL_OUTOF10: 5
PAINLEVEL_OUTOF10: 2
PAINLEVEL_OUTOF10: 2

## 2023-10-23 ASSESSMENT — PAIN DESCRIPTION - DESCRIPTORS
DESCRIPTORS: DISCOMFORT
DESCRIPTORS: DISCOMFORT
DESCRIPTORS: DULL;ACHING
DESCRIPTORS: DULL;ACHING

## 2023-10-23 ASSESSMENT — PAIN DESCRIPTION - ORIENTATION: ORIENTATION: MID

## 2023-10-23 ASSESSMENT — PAIN DESCRIPTION - FREQUENCY
FREQUENCY: CONTINUOUS
FREQUENCY: INTERMITTENT
FREQUENCY: INTERMITTENT

## 2023-10-23 ASSESSMENT — PAIN - FUNCTIONAL ASSESSMENT
PAIN_FUNCTIONAL_ASSESSMENT: PREVENTS OR INTERFERES WITH MANY ACTIVE NOT PASSIVE ACTIVITIES
PAIN_FUNCTIONAL_ASSESSMENT: 0-10

## 2023-10-23 ASSESSMENT — PAIN DESCRIPTION - ONSET: ONSET: ON-GOING

## 2023-10-23 NOTE — H&P
DO at [facility] OR    OTHER SURGICAL HISTORY         cardioversion    PACEMAKER PLACEMENT         LASER LEAD EXTRACTION  03/02/2018    SKIN BIOPSY        TONSILLECTOMY        UPPER GASTROINTESTINAL ENDOSCOPY N/A 2/13/2023     EGD BAND LIGATION performed by Stew Ratliff MD at [address] [place] [address] N/A 6/12/2023     EGD BAND LIGATION performed by Stew Ratliff MD at [facility] ENDOSCOPY            Family History         Family History   Problem Relation Age of Onset    Coronary Art Dis Mother      Osteoporosis Mother      Hypertension Father      Heart Disease Father      Emphysema Father              Social History               Socioeconomic History    Marital status:        Spouse name: June    Number of children: 1    Years of education: 16    Highest education level: Not on file   Occupational History    Not on file   Tobacco Use    Smoking status: Never    Smokeless tobacco: Never   Vaping Use    Vaping Use: Never used   Substance and Sexual Activity    Alcohol use: No       Comment: last drink 2005    Drug use: No    Sexual activity: Not Currently       Partners: Female   Other Topics Concern    Not on file   Social History Narrative    Not on file      Social Determinants of Health           Financial Resource Strain: Low Risk  (7/6/2023)     Overall Financial Resource Strain (CARDIA)      Difficulty of Paying Living Expenses: Not hard at all   Food Insecurity: No Food Insecurity (7/6/2023)     Hunger Vital Sign      Worried About Running Out of Food in the Last Year: Never true      [address] in the Last Year: Never true   Transportation Needs: Unknown (7/6/2023)     PRAPARE - Transportation      Lack of Transportation (Medical): Not on file      Lack of Transportation (Non-Medical):  No   Physical Activity: Not on file   Stress: Not on file   Social Connections: Not on file   Intimate Partner Violence: Not on file   Housing Stability: Unknown (7/6/2023)     Housing Stability

## 2023-10-23 NOTE — OP NOTE
proposed robotic trocars and 8mm robotic optiview port used in this location for abdominal entry. In the midline between umbilicus and epigastrium, incision was made and 8 mm port was placed into the abdomen. Additional 8 millimeter port was placed under direct visualization in the RUQ. The robot was then brought up to the table and docked. Camera and working instruments are then inserted and directed down to the Right lower quadrant. The defect was Lateral to the epigastric vessels. Small bowel was removed from the defect. The peritoneum was then incised at the level ASIS from lateral to medial. Our preperitoneal working space was then created with blunt dissection as well as scissor cautery. This was carried down to the level of the pubic tubercle medially and to the ASIS laterally. Cr's ligament was identified and cleared off medially. The hernia sac and cord structures were identified. Hernia sac was dissected free from the cord structures. Once the hernia sac was circumferentially dissected off the cord structures without any tenting, we were ready to place our mesh. Hemostasis was ensured with electrocautery. The Progrip mesh was then inserted and flattened along the myopectineal orifice. We had good coverage of the entire area with peritoneum dissected away from the inferior edge of the mesh. 2-0 V lock suture was then used to sew the peritoneum back together in place. Pneumoperitoneum was released. Ports were removed and the skin incisions were closed with 4-0 Monocryl suture. Skin glue was applied. Patient tolerated procedure well without any complications. Counts were correct at the end of the case. Dr. Ramonita Maynard was present for the entire case. Electronically signed by Marbella Leggett MD on 10/23/2023 at 12:26 PM    Attestation:    I was present during the procedure and participated in all shaffer aspects.     Vane Agarwal MD  10/23/23

## 2023-10-23 NOTE — ANESTHESIA PRE PROCEDURE
found for: \"PREGTESTUR\", \"PREGSERUM\", \"HCG\", \"HCGQUANT\"     ABGs: No results found for: \"PHART\", \"PO2ART\", \"VKE2BLQ\", \"VYS8BQZ\", \"BEART\", \"F8MBKJNQ\"     Type & Screen (If Applicable):  No results found for: \"LABABO\", \"LABRH\"    Drug/Infectious Status (If Applicable):  No results found for: \"HIV\", \"HEPCAB\"    COVID-19 Screening (If Applicable):   Lab Results   Component Value Date/Time    COVID19  07/23/2022 04:03 PM      Comment:      SARS-CoV2 [id number] RNA Resp Ql BLAIRE+probe           Anesthesia Evaluation  Patient summary reviewed  Airway: Mallampati: III  TM distance: >3 FB   Neck ROM: full  Mouth opening: > = 3 FB   Dental: normal exam         Pulmonary:normal exam    (+) COPD:                             Cardiovascular:    (+) hypertension:, pacemaker (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 It had originally been for Bradycardia. He states that they are just watching this bradycardia and no longer felt the pacer was necessary. ):, dysrhythmias: atrial fibrillation, CHF:,       ECG reviewed    Rate: abnormal           Beta Blocker:  Dose within 24 Hrs         Neuro/Psych:   (+) psychiatric history:depression/anxiety             GI/Hepatic/Renal: Neg GI/Hepatic/Renal ROS  (+) liver disease:,           Endo/Other:                     Abdominal:             Vascular: Other Findings:           Anesthesia Plan      general     ASA 4       Induction: intravenous. MIPS: Postoperative opioids intended and Prophylactic antiemetics administered. Anesthetic plan and risks discussed with patient. Plan discussed with LENI. Ness Jose DO   10/23/2023    Patient seen; chart reviewed and agree with above.     JOE Patel - LENI

## 2023-10-23 NOTE — ANESTHESIA POSTPROCEDURE EVALUATION
Department of Anesthesiology  Postprocedure Note    Patient: Nikhil Garvin  MRN: [id number]  YOB: 1940  Date of evaluation: 10/23/2023      Procedure Summary     Date: 10/23/23 Room / Location: [facility] OR 10 / [facility]    Anesthesia Start: 1107 Anesthesia Stop: 1227    Procedure: LAPAROSCOPIC ROBOTIC XI ASSISTED RIGHT INGUINAL HERNIA REPAIR WITH MESH (Right: Abdomen) Diagnosis:       Inguinal hernia      (Inguinal hernia [K40.90])    Surgeons: Jose Miguel Olvera MD Responsible Provider: Rico Vincent DO    Anesthesia Type: general ASA Status: 4          Anesthesia Type: No value filed.     Emily Phase I: Emily Score: 9    Emily Phase II:        Anesthesia Post Evaluation    Patient location during evaluation: PACU  Patient participation: complete - patient participated  Level of consciousness: awake  Pain score: 2  Airway patency: patent  Nausea & Vomiting: no nausea and no vomiting  Complications: no  Cardiovascular status: hemodynamically stable  Respiratory status: acceptable  Hydration status: stable  Pain management: adequate

## 2023-10-24 LAB
EKG ATRIAL RATE: 66 BPM
EKG Q-T INTERVAL: 480 MS
EKG QRS DURATION: 88 MS
EKG QTC CALCULATION (BAZETT): 433 MS
EKG R AXIS: -50 DEGREES
EKG T AXIS: 26 DEGREES
EKG VENTRICULAR RATE: 49 BPM

## 2023-11-06 ENCOUNTER — OFFICE VISIT (OUTPATIENT)
Dept: FAMILY MEDICINE CLINIC | Age: 83
End: 2023-11-06
Payer: MEDICARE

## 2023-11-06 VITALS
HEART RATE: 60 BPM | RESPIRATION RATE: 17 BRPM | SYSTOLIC BLOOD PRESSURE: 112 MMHG | OXYGEN SATURATION: 97 % | DIASTOLIC BLOOD PRESSURE: 68 MMHG | WEIGHT: 175 LBS | HEIGHT: 73 IN | TEMPERATURE: 97.5 F | BODY MASS INDEX: 23.19 KG/M2

## 2023-11-06 DIAGNOSIS — K74.60 CIRRHOSIS OF LIVER WITHOUT ASCITES, UNSPECIFIED HEPATIC CIRRHOSIS TYPE (HCC): Primary | ICD-10-CM

## 2023-11-06 DIAGNOSIS — F32.89 OTHER DEPRESSION: ICD-10-CM

## 2023-11-06 DIAGNOSIS — N40.1 BENIGN PROSTATIC HYPERPLASIA WITH URINARY FREQUENCY: ICD-10-CM

## 2023-11-06 DIAGNOSIS — R35.0 BENIGN PROSTATIC HYPERPLASIA WITH URINARY FREQUENCY: ICD-10-CM

## 2023-11-06 DIAGNOSIS — C61 PROSTATE CANCER (HCC): ICD-10-CM

## 2023-11-06 DIAGNOSIS — I10 PRIMARY HYPERTENSION: ICD-10-CM

## 2023-11-06 DIAGNOSIS — H90.A22 SENSORINEURAL HEARING LOSS (SNHL) OF LEFT EAR WITH RESTRICTED HEARING OF RIGHT EAR: ICD-10-CM

## 2023-11-06 DIAGNOSIS — Z23 IMMUNIZATION DUE: ICD-10-CM

## 2023-11-06 PROBLEM — F32.A DEPRESSION: Status: ACTIVE | Noted: 2023-11-06

## 2023-11-06 PROCEDURE — 90694 VACC AIIV4 NO PRSRV 0.5ML IM: CPT | Performed by: FAMILY MEDICINE

## 2023-11-06 PROCEDURE — 1123F ACP DISCUSS/DSCN MKR DOCD: CPT | Performed by: FAMILY MEDICINE

## 2023-11-06 PROCEDURE — 3074F SYST BP LT 130 MM HG: CPT | Performed by: FAMILY MEDICINE

## 2023-11-06 PROCEDURE — 3078F DIAST BP <80 MM HG: CPT | Performed by: FAMILY MEDICINE

## 2023-11-06 PROCEDURE — G0008 ADMIN INFLUENZA VIRUS VAC: HCPCS | Performed by: FAMILY MEDICINE

## 2023-11-06 PROCEDURE — 99214 OFFICE O/P EST MOD 30 MIN: CPT | Performed by: FAMILY MEDICINE

## 2023-11-06 RX ORDER — URSODIOL 300 MG/1
300 CAPSULE ORAL 2 TIMES DAILY
Qty: 180 CAPSULE | Refills: 1 | Status: SHIPPED | OUTPATIENT
Start: 2023-11-06

## 2023-11-06 RX ORDER — BUPROPION HYDROCHLORIDE 150 MG/1
150 TABLET, EXTENDED RELEASE ORAL EVERY MORNING
Qty: 90 TABLET | Refills: 1 | Status: SHIPPED | OUTPATIENT
Start: 2023-11-06

## 2023-11-06 RX ORDER — TAMSULOSIN HYDROCHLORIDE 0.4 MG/1
0.4 CAPSULE ORAL DAILY
Qty: 90 CAPSULE | Refills: 1 | Status: SHIPPED | OUTPATIENT
Start: 2023-11-06

## 2023-11-06 RX ORDER — SPIRONOLACTONE 100 MG/1
50 TABLET, FILM COATED ORAL EVERY OTHER DAY
Qty: 30 TABLET | Refills: 5 | Status: SHIPPED | OUTPATIENT
Start: 2023-11-06

## 2023-11-06 RX ORDER — NADOLOL 20 MG/1
10 TABLET ORAL EVERY MORNING
Qty: 90 TABLET | Refills: 1 | Status: SHIPPED | OUTPATIENT
Start: 2023-11-06

## 2023-11-06 ASSESSMENT — ENCOUNTER SYMPTOMS
PHOTOPHOBIA: 0
WHEEZING: 0
ABDOMINAL PAIN: 0
BLOOD IN STOOL: 0
EYE REDNESS: 0
COUGH: 0
SHORTNESS OF BREATH: 0

## 2023-11-06 NOTE — PROGRESS NOTES
OFFICE NOTE    11/6/23  Name: Jimy Carey  CWM:2/72[birth year]   Sex:male   Age:83 y.o. SUBJECTIVE  Chief Complaint   Patient presents with    Medication Refill    Hearing Problem     Left ear        HPI Sam came in. [facility] surgery everyone was worried about and sailed through without complications from his cirrhosis    Review of Systems   Constitutional:  Positive for activity change and fatigue. Negative for appetite change and unexpected weight change. HENT:  Positive for congestion, hearing loss and postnasal drip. Eyes:  Negative for photophobia, redness and visual disturbance. Respiratory:  Negative for cough, shortness of breath and wheezing. Cardiovascular:  Positive for leg swelling. Negative for chest pain and palpitations. Gastrointestinal:  Negative for abdominal pain and blood in stool. Genitourinary:  Positive for frequency and urgency. Negative for dysuria and hematuria. Musculoskeletal:  Positive for gait problem. Negative for arthralgias. Skin:  Negative for pallor and rash. Neurological:  Negative for seizures. Psychiatric/Behavioral:  Negative for confusion, decreased concentration, dysphoric mood and sleep disturbance. The patient is nervous/anxious. All other systems reviewed and are negative.            Current Outpatient Medications:     tamsulosin (FLOMAX) 0.4 MG capsule, Take 1 capsule by mouth daily, Disp: 90 capsule, Rfl: 1    ursodiol (ACTIGALL) 300 MG capsule, Take 1 capsule by mouth 2 times daily, Disp: 180 capsule, Rfl: 1    potassium bicarbonate (EFFER-K) 25 MEQ disintegrating tablet, Take 1 tablet by mouth every other day, Disp: 45 tablet, Rfl: 1    buPROPion (WELLBUTRIN SR) 150 MG extended release tablet, Take 1 tablet by mouth every morning, Disp: 90 tablet, Rfl: 1    spironolactone (ALDACTONE) 100 MG tablet, Take 0.5 tablets by mouth every other day, Disp: 30 tablet, Rfl: 5    nadolol (CORGARD) 20 MG tablet, Take 0.5 tablets by mouth every morning,

## 2023-11-10 ENCOUNTER — OFFICE VISIT (OUTPATIENT)
Dept: SURGERY | Age: 83
End: 2023-11-10

## 2023-11-10 VITALS
DIASTOLIC BLOOD PRESSURE: 69 MMHG | BODY MASS INDEX: 22.8 KG/M2 | HEART RATE: 54 BPM | WEIGHT: 172 LBS | OXYGEN SATURATION: 99 % | HEIGHT: 73 IN | SYSTOLIC BLOOD PRESSURE: 102 MMHG

## 2023-11-10 DIAGNOSIS — Z87.19 S/P RIGHT INGUINAL HERNIA REPAIR: Primary | ICD-10-CM

## 2023-11-10 DIAGNOSIS — Z98.890 S/P RIGHT INGUINAL HERNIA REPAIR: Primary | ICD-10-CM

## 2023-11-10 PROCEDURE — 99024 POSTOP FOLLOW-UP VISIT: CPT | Performed by: SURGERY

## 2023-12-06 ENCOUNTER — OFFICE VISIT (OUTPATIENT)
Dept: GASTROENTEROLOGY | Age: 83
End: 2023-12-06
Payer: MEDICARE

## 2023-12-06 ENCOUNTER — TELEPHONE (OUTPATIENT)
Dept: GASTROENTEROLOGY | Age: 83
End: 2023-12-06

## 2023-12-06 VITALS — RESPIRATION RATE: 16 BRPM | TEMPERATURE: 97 F

## 2023-12-06 DIAGNOSIS — I85.10 ESOPHAGEAL VARICES IN CIRRHOSIS (HCC): ICD-10-CM

## 2023-12-06 DIAGNOSIS — R18.8 OTHER ASCITES: ICD-10-CM

## 2023-12-06 DIAGNOSIS — K74.69 OTHER CIRRHOSIS OF LIVER (HCC): Primary | ICD-10-CM

## 2023-12-06 DIAGNOSIS — K74.60 ESOPHAGEAL VARICES IN CIRRHOSIS (HCC): ICD-10-CM

## 2023-12-06 DIAGNOSIS — K76.82 HEPATIC ENCEPHALOPATHY (HCC): ICD-10-CM

## 2023-12-06 PROCEDURE — 1123F ACP DISCUSS/DSCN MKR DOCD: CPT | Performed by: STUDENT IN AN ORGANIZED HEALTH CARE EDUCATION/TRAINING PROGRAM

## 2023-12-06 PROCEDURE — 99213 OFFICE O/P EST LOW 20 MIN: CPT | Performed by: STUDENT IN AN ORGANIZED HEALTH CARE EDUCATION/TRAINING PROGRAM

## 2023-12-06 NOTE — TELEPHONE ENCOUNTER
Prior Authorization Form:      DEMOGRAPHICS:                     Patient Name:  Amanuel Oswald  Patient :  1940            Insurance:  Payor: Flaco Soler / Plan: Shania Chen PPO / Product Type: Medicare /   Insurance ID Number:    Payer/Plan Subscr  Sex Relation Sub. Ins. ID Effective Group Num   1.  Flaco Oswald 1940 Male [facility] [id number] [id number] [address]         DIAGNOSIS & PROCEDURE:                       Procedure/Operation: egd           CPT Code: 45712    Diagnosis:  esophageal varices    ICD10 Code: i85.00    Location:  [facility]    Surgeon:  Pina Carrion INFORMATION:                          Date: 3-25-24    Time: 230              Anesthesia:  MAC/TIVA                                                       Status:  Outpatient        Special Comments:  na       Electronically signed by Ailyn Burgess LPN on  at 7:47 PM

## 2023-12-13 PROBLEM — K74.5 BILIARY CIRRHOSIS (HCC): Status: RESOLVED | Noted: 2022-09-12 | Resolved: 2023-12-13

## 2023-12-13 PROBLEM — I85.00 ESOPHAGEAL VARICES (HCC): Status: RESOLVED | Noted: 2022-10-05 | Resolved: 2023-12-13

## 2023-12-13 PROBLEM — K74.60 CIRRHOSIS OF LIVER WITHOUT ASCITES (HCC): Status: RESOLVED | Noted: 2022-11-09 | Resolved: 2023-12-13

## 2024-01-01 ENCOUNTER — TELEPHONE (OUTPATIENT)
Dept: FAMILY MEDICINE CLINIC | Age: 84
End: 2024-01-01

## 2024-01-05 ENCOUNTER — TELEPHONE (OUTPATIENT)
Age: 84
End: 2024-01-05

## 2024-01-05 NOTE — TELEPHONE ENCOUNTER
Spoke to wife about procedure date change from 3-25-24 to 3-4-24. Electronically signed by Arlen Cornelius LPN on 1/5/2024 at 3:35 PM

## 2024-02-15 ENCOUNTER — OFFICE VISIT (OUTPATIENT)
Dept: FAMILY MEDICINE CLINIC | Age: 84
End: 2024-02-15

## 2024-02-15 VITALS
TEMPERATURE: 97.8 F | WEIGHT: 184 LBS | RESPIRATION RATE: 24 BRPM | HEART RATE: 48 BPM | SYSTOLIC BLOOD PRESSURE: 108 MMHG | DIASTOLIC BLOOD PRESSURE: 58 MMHG | HEIGHT: 73 IN | BODY MASS INDEX: 24.39 KG/M2 | OXYGEN SATURATION: 93 %

## 2024-02-15 DIAGNOSIS — R06.09 DYSPNEA ON EXERTION: ICD-10-CM

## 2024-02-15 DIAGNOSIS — K70.31 ALCOHOLIC CIRRHOSIS OF LIVER WITH ASCITES (HCC): Primary | ICD-10-CM

## 2024-02-15 NOTE — PROGRESS NOTES
Chief Complaint:   Cough (States he has had a dry cough for awhile now /And also has had sticky, thick mucus he is not able to break loose) and OTHER (Diastolic bp has been running lower- along with pulse the last 12 days )      History of Present Illness   HPI:  Sam Jack is a 83 y.o. male who [facility] for worsening cough, exertional dyspnea, increasing abdominal ascites coupled with recent 4-6# weight gain this past week. Know history of diastolic dysfunction, liver cirrhosis, and COPD    Prior to Visit Medications    Medication Sig Taking? Authorizing Provider   tamsulosin (FLOMAX) 0.4 MG capsule Take 1 capsule by mouth daily Yes Fabricio Mallory MD   ursodiol (ACTIGALL) 300 MG capsule Take 1 capsule by mouth 2 times daily Yes Fabricio Mallory MD   potassium bicarbonate (EFFER-K) 25 MEQ disintegrating tablet Take 1 tablet by mouth every other day Yes Fabricio Mallory MD   buPROPion (WELLBUTRIN SR) 150 MG extended release tablet Take 1 tablet by mouth every morning Yes Fabricio Mallory MD   spironolactone (ALDACTONE) 100 MG tablet Take 0.5 tablets by mouth every other day Yes Fabricio Mallory MD   nadolol (CORGARD) 20 MG tablet Take 0.5 tablets by mouth every morning Yes Fabricio Mallory MD   ELIQUIS 2.5 MG TABS tablet Take 1 tablet by mouth twice daily Yes Fabricio Mallory MD   lactulose (CHRONULAC) 10 GM/15ML solution Take 15 mLs by mouth as needed (constipation) Yes Fabricio Mallory MD   omeprazole (PRILOSEC) 40 MG delayed release capsule Take 1 capsule by mouth 2 times daily (before meals) Yes Dyllan Lopez MD   bumetanide (BUMEX) 1 MG tablet Take 1 tablet by mouth daily Yes Mary Joshi MD   Cholecalciferol (VITAMIN D) 50 MCG (2000 UT) CAPS capsule Take by mouth Yes Mary Joshi MD   Zinc Sulfate (ZINC 15 PO) Take by mouth Yes Provider, Historical, MD   Handicap Placard MISC by Does not apply route 5 years. OA as diagnosis Yes Fabricio Mallory MD   Multiple Vitamins-Minerals

## 2024-02-27 ENCOUNTER — TELEPHONE (OUTPATIENT)
Age: 84
End: 2024-02-27

## 2024-02-27 ENCOUNTER — OFFICE VISIT (OUTPATIENT)
Dept: FAMILY MEDICINE CLINIC | Age: 84
End: 2024-02-27

## 2024-02-27 VITALS
HEIGHT: 73 IN | HEART RATE: 73 BPM | RESPIRATION RATE: 16 BRPM | BODY MASS INDEX: 23.83 KG/M2 | SYSTOLIC BLOOD PRESSURE: 116 MMHG | TEMPERATURE: 97.1 F | DIASTOLIC BLOOD PRESSURE: 74 MMHG | OXYGEN SATURATION: 94 % | WEIGHT: 179.8 LBS

## 2024-02-27 DIAGNOSIS — R35.1 BENIGN PROSTATIC HYPERPLASIA WITH NOCTURIA: ICD-10-CM

## 2024-02-27 DIAGNOSIS — F51.04 PSYCHOPHYSIOLOGICAL INSOMNIA: ICD-10-CM

## 2024-02-27 DIAGNOSIS — Z09 HOSPITAL DISCHARGE FOLLOW-UP: Primary | ICD-10-CM

## 2024-02-27 DIAGNOSIS — I50.32 CHRONIC DIASTOLIC HEART FAILURE (HCC): ICD-10-CM

## 2024-02-27 DIAGNOSIS — K74.60 CIRRHOSIS OF LIVER WITHOUT ASCITES, UNSPECIFIED HEPATIC CIRRHOSIS TYPE (HCC): ICD-10-CM

## 2024-02-27 DIAGNOSIS — N40.1 BENIGN PROSTATIC HYPERPLASIA WITH NOCTURIA: ICD-10-CM

## 2024-02-27 DIAGNOSIS — N40.1 BENIGN PROSTATIC HYPERPLASIA WITH URINARY FREQUENCY: ICD-10-CM

## 2024-02-27 DIAGNOSIS — N18.31 STAGE 3A CHRONIC KIDNEY DISEASE (HCC): ICD-10-CM

## 2024-02-27 DIAGNOSIS — C61 PROSTATE CANCER (HCC): ICD-10-CM

## 2024-02-27 DIAGNOSIS — D69.6 THROMBOCYTOPENIA, UNSPECIFIED (HCC): ICD-10-CM

## 2024-02-27 DIAGNOSIS — I48.0 PAROXYSMAL ATRIAL FIBRILLATION (HCC): ICD-10-CM

## 2024-02-27 DIAGNOSIS — R35.0 BENIGN PROSTATIC HYPERPLASIA WITH URINARY FREQUENCY: ICD-10-CM

## 2024-02-27 PROBLEM — J44.9 CHRONIC OBSTRUCTIVE PULMONARY DISEASE (HCC): Status: RESOLVED | Noted: 2022-07-20 | Resolved: 2024-02-27

## 2024-02-27 PROBLEM — I85.10 ESOPHAGEAL VARICES IN CIRRHOSIS (HCC): Status: RESOLVED | Noted: 2022-12-15 | Resolved: 2024-02-27

## 2024-02-27 PROBLEM — F33.0 MAJOR DEPRESSIVE DISORDER, RECURRENT, MILD (HCC): Status: RESOLVED | Noted: 2023-08-08 | Resolved: 2024-02-27

## 2024-02-27 PROBLEM — K76.82 HEPATIC ENCEPHALOPATHY (HCC): Status: RESOLVED | Noted: 2022-12-15 | Resolved: 2024-02-27

## 2024-02-27 RX ORDER — URSODIOL 300 MG/1
300 CAPSULE ORAL 2 TIMES DAILY
Qty: 180 CAPSULE | Refills: 1 | Status: SHIPPED | OUTPATIENT
Start: 2024-02-27

## 2024-02-27 RX ORDER — LORAZEPAM 0.5 MG/1
TABLET ORAL
Qty: 30 TABLET | Refills: 1 | Status: SHIPPED | OUTPATIENT
Start: 2024-02-27 | End: 2024-05-27

## 2024-02-27 RX ORDER — BUMETANIDE 1 MG/1
1 TABLET ORAL DAILY
Qty: 90 TABLET | Refills: 1 | Status: SHIPPED | OUTPATIENT
Start: 2024-02-27

## 2024-02-27 RX ORDER — TAMSULOSIN HYDROCHLORIDE 0.4 MG/1
0.4 CAPSULE ORAL DAILY
Qty: 90 CAPSULE | Refills: 1 | Status: SHIPPED | OUTPATIENT
Start: 2024-02-27

## 2024-02-27 ASSESSMENT — LIFESTYLE VARIABLES
HOW OFTEN DO YOU HAVE A DRINK CONTAINING ALCOHOL: NEVER
HOW MANY STANDARD DRINKS CONTAINING ALCOHOL DO YOU HAVE ON A TYPICAL DAY: PATIENT DOES NOT DRINK

## 2024-02-27 ASSESSMENT — PATIENT HEALTH QUESTIONNAIRE - PHQ9
SUM OF ALL RESPONSES TO PHQ QUESTIONS 1-9: 6
9. THOUGHTS THAT YOU WOULD BE BETTER OFF DEAD, OR OF HURTING YOURSELF: 0
SUM OF ALL RESPONSES TO PHQ QUESTIONS 1-9: 6
SUM OF ALL RESPONSES TO PHQ9 QUESTIONS 1 & 2: 0
SUM OF ALL RESPONSES TO PHQ QUESTIONS 1-9: 6
7. TROUBLE CONCENTRATING ON THINGS, SUCH AS READING THE NEWSPAPER OR WATCHING TELEVISION: 0
6. FEELING BAD ABOUT YOURSELF - OR THAT YOU ARE A FAILURE OR HAVE LET YOURSELF OR YOUR FAMILY DOWN: 0
4. FEELING TIRED OR HAVING LITTLE ENERGY: 3
10. IF YOU CHECKED OFF ANY PROBLEMS, HOW DIFFICULT HAVE THESE PROBLEMS MADE IT FOR YOU TO DO YOUR WORK, TAKE CARE OF THINGS AT HOME, OR GET ALONG WITH OTHER PEOPLE: 0
8. MOVING OR SPEAKING SO SLOWLY THAT OTHER PEOPLE COULD HAVE NOTICED. OR THE OPPOSITE, BEING SO FIGETY OR RESTLESS THAT YOU HAVE BEEN MOVING AROUND A LOT MORE THAN USUAL: 0
1. LITTLE INTEREST OR PLEASURE IN DOING THINGS: 0
2. FEELING DOWN, DEPRESSED OR HOPELESS: 0
3. TROUBLE FALLING OR STAYING ASLEEP: 3
5. POOR APPETITE OR OVEREATING: 0
SUM OF ALL RESPONSES TO PHQ QUESTIONS 1-9: 6

## 2024-02-27 ASSESSMENT — ENCOUNTER SYMPTOMS
COUGH: 1
PHOTOPHOBIA: 0
RHINORRHEA: 1
BLOOD IN STOOL: 0
EYE REDNESS: 0
WHEEZING: 0
SHORTNESS OF BREATH: 0
ABDOMINAL PAIN: 0

## 2024-02-27 NOTE — PROGRESS NOTES
return to sleep after voiding in middle of night., Disp: 30 tablet, Rfl: 1    buPROPion (WELLBUTRIN SR) 150 MG extended release tablet, Take 1 tablet by mouth every morning, Disp: 90 tablet, Rfl: 1    lactulose (CHRONULAC) 10 GM/15ML solution, Take 15 mLs by mouth as needed (constipation), Disp: 1000 mL, Rfl: 5    omeprazole (PRILOSEC) 40 MG delayed release capsule, Take 1 capsule by mouth 2 times daily (before meals), Disp: 60 capsule, Rfl: 11    Cholecalciferol (VITAMIN D) 50 MCG (2000 UT) CAPS capsule, Take by mouth, Disp: , Rfl:     Zinc Sulfate (ZINC 15 PO), Take by mouth, Disp: , Rfl:     Handicap Placard MISC, by Does not apply route 5 years. OA as diagnosis, Disp: 2 each, Rfl: 0    Multiple Vitamins-Minerals (THERAPEUTIC MULTIVITAMIN-MINERALS) tablet, Take 1 tablet by mouth daily, Disp: , Rfl:     spironolactone (ALDACTONE) 100 MG tablet, Take 0.5 tablets by mouth every other day (Patient not taking: Reported on 2/27/2024), Disp: 30 tablet, Rfl: 5    nadolol (CORGARD) 20 MG tablet, Take 0.5 tablets by mouth every morning (Patient not taking: Reported on 2/27/2024), Disp: 90 tablet, Rfl: 1  Allergies   Allergen Reactions    Flecainide Dermatitis    Zoloft [Sertraline]        Past Medical History:   Diagnosis Date    Arthritis     Knees    Atrial fibrillation (HCC)     Cancer (HCC)     skin cancer; Prostate cancer     CHF (congestive heart failure) (HCC)     Chronic obstructive pulmonary disease (HCC) 07/20/2022    Cirrhosis of liver (HCC)     Gallstone pancreatitis     H/O esophagogastroduodenoscopy 02/2013    dr flores no Hpylori, or Howell's     H/O ETOH abuse     last drink 2005    Herpes zoster 10/07/2023    No blisters or weeping remain as of 10/18/2023 - a few dry scabs remain - completed medication as prescribed    [place] (hard of hearing)     Hypertension     Inguinal hernia     RIGHT    Pacemaker     REMOVED 2018 due to infection     Past Surgical History:   Procedure Laterality Date    ABDOMEN SURGERY

## 2024-02-27 NOTE — TELEPHONE ENCOUNTER
Prior Authorization Form:      DEMOGRAPHICS:                     Patient Name:  Sam Grace  Patient :  1940            Insurance:  Payor: [employer] MEDICARE / Plan: AET[employer] MEDICARE-ADVANTAGE PPO / Product Type: Medicare /   Insurance ID Number:    Payer/Plan Subscr  Sex Relation Sub. Ins. ID Effective Group Num   1. [employer] MEDICAR* SAM GRACE 1940 Male Self [id number] [id number] [address]         DIAGNOSIS & PROCEDURE:                       Procedure/Operation: egd   possible banding         CPT Code: 42119    Diagnosis:  esophageal varices    ICD10 Code: I85.00    Location:  [facility]    Surgeon:  yudelka    SCHEDULING INFORMATION:                          Date: 24    Time: 200              Anesthesia:  MAC/TIVA                                                       Status:  Outpatient        Special Comments:  possible banding       Electronically signed by Arlen Cornelius LPN on 2024 at 2:12 PM

## 2024-03-11 ENCOUNTER — OFFICE VISIT (OUTPATIENT)
Dept: FAMILY MEDICINE CLINIC | Age: 84
End: 2024-03-11
Payer: MEDICARE

## 2024-03-11 VITALS
OXYGEN SATURATION: 95 % | TEMPERATURE: 97.6 F | HEIGHT: 73 IN | WEIGHT: 184 LBS | SYSTOLIC BLOOD PRESSURE: 98 MMHG | HEART RATE: 63 BPM | DIASTOLIC BLOOD PRESSURE: 54 MMHG | BODY MASS INDEX: 24.39 KG/M2

## 2024-03-11 DIAGNOSIS — J60 COAL WORKERS PNEUMOCONIOSIS (HCC): ICD-10-CM

## 2024-03-11 DIAGNOSIS — F33.0 MAJOR DEPRESSIVE DISORDER, RECURRENT, MILD (HCC): ICD-10-CM

## 2024-03-11 DIAGNOSIS — L57.0 ACTINIC KERATOSES: Primary | ICD-10-CM

## 2024-03-11 PROCEDURE — 99214 OFFICE O/P EST MOD 30 MIN: CPT | Performed by: FAMILY MEDICINE

## 2024-03-11 PROCEDURE — 3078F DIAST BP <80 MM HG: CPT | Performed by: FAMILY MEDICINE

## 2024-03-11 PROCEDURE — 1123F ACP DISCUSS/DSCN MKR DOCD: CPT | Performed by: FAMILY MEDICINE

## 2024-03-11 PROCEDURE — 3074F SYST BP LT 130 MM HG: CPT | Performed by: FAMILY MEDICINE

## 2024-03-11 PROCEDURE — 17000 DESTRUCT PREMALG LESION: CPT | Performed by: FAMILY MEDICINE

## 2024-03-11 RX ORDER — FINASTERIDE 5 MG/1
5 TABLET, FILM COATED ORAL DAILY
Qty: 30 TABLET | Refills: 3 | Status: SHIPPED | OUTPATIENT
Start: 2024-03-11

## 2024-03-11 RX ORDER — SPIRONOLACTONE 25 MG/1
25 TABLET ORAL DAILY
Qty: 90 TABLET | Refills: 1 | Status: SHIPPED | OUTPATIENT
Start: 2024-03-11

## 2024-03-11 NOTE — PROGRESS NOTES
distension.      Palpations: There is no mass.      Tenderness: There is no abdominal tenderness. There is no guarding.      Hernia: No hernia is present.   Musculoskeletal:         General: No swelling or tenderness.      Cervical back: No tenderness.      Comments: Trace to 1+ dependent edema.   Lymphadenopathy:      Cervical: No cervical adenopathy.   Skin:     Coloration: Skin is not jaundiced or pale.      Findings: Lesion present. No bruising or rash.      Comments: Lesion with scale anterior chest. Aggressive AK suspected   Neurological:      General: No focal deficit present.      Mental Status: He is alert and oriented to person, place, and time.      Sensory: No sensory deficit.      Motor: Weakness present.      Coordination: Coordination normal.      Gait: Gait abnormal.   Psychiatric:         Mood and Affect: Mood normal.         Behavior: Behavior normal.           Sam was seen today for 4 month.    Diagnoses and all orders for this visit:    Actinic keratoses  -     24917 - [place] DESTRUC PREMALIGNANT, FIRST LESION  Cryo with verucca freeze. May need to see Derm if this is unsuccessful  [employer] workers pneumoconiosis (HCC)  Pulse ox 95% believe CHF his bigger problem  Major depressive disorder, recurrent, mild  On welbutrin. Not overtly depressed. Health decling  Other orders  -     finasteride (PROSCAR) 5 MG tablet; Take 1 tablet by mouth daily  -     spironolactone (ALDACTONE) 25 MG tablet; Take 1 tablet by mouth daily  Will stop tamsulosin and replace with finasteride 5 mg per day. This may improve his BP enough to add aldactone. Will not put on higher dose he was on before. Can still use 2nd bumex as needed for fluid retentions        Return in about 2 months (around 5/11/2024).    Electronically signed by Fabricio Mallory MD on 3/11/24 at 8:37 AM EDT

## 2024-03-14 ENCOUNTER — TELEPHONE (OUTPATIENT)
Dept: FAMILY MEDICINE CLINIC | Age: 84
End: 2024-03-14

## 2024-03-14 NOTE — TELEPHONE ENCOUNTER
Kristal was informed and gave a verbal understanding.   Kristal stated that she will let us know how the area looks in 2 weeks.

## 2024-03-14 NOTE — TELEPHONE ENCOUNTER
Patient's wife reporting that patient started a new medication prescribe, finasteride.  Patient has a rash all over ABD and back.  Wife noted that patient seems to have increased SOB but always has some SOB.  Wife denies patient having difficulty breathing.  Advised wife to stop medication until Dr. Mallory advises.

## 2024-03-14 NOTE — TELEPHONE ENCOUNTER
Agree can hold it for a while and see if rash resolves. If not it may be something else and could probably resume in 2 weeks

## 2024-04-01 ENCOUNTER — TELEPHONE (OUTPATIENT)
Dept: FAMILY MEDICINE CLINIC | Age: 84
End: 2024-04-01

## 2024-04-01 NOTE — TELEPHONE ENCOUNTER
Patient's wife, June, calling in to give update on patient's rash.  After patient stopped finasteride approximately 3 days later the rash had cleared up and his breathing was normal.  Patient is not taking the tamsulosin either.  Per June, patient is not having any symptoms being off the medication.  Finasteride added to patient's allergy list.  Please advise.

## 2024-04-03 NOTE — TELEPHONE ENCOUNTER
Patient's wife informed.  Advised to call office if any further concerns or issues.  I did inform wife that finasteride was added to patient's allergy list.

## 2024-04-16 DIAGNOSIS — K74.69 OTHER CIRRHOSIS OF LIVER (HCC): Primary | ICD-10-CM

## 2024-05-08 RX ORDER — ZINC GLUCONATE 50 MG
50 TABLET ORAL DAILY
COMMUNITY

## 2024-05-08 NOTE — PROGRESS NOTES
do not wear any nail polish, make up or hair products on the day of surgery    [x] You can expect a call the business day prior to procedure to notify you if your arrival time changes    [x] If you receive a survey after surgery we would greatly appreciate your comments    [] Parent/guardian of a minor must accompany their child and remain on the premises  the entire time they are under our care     [] Pediatric patients may bring favorite toy, blanket or comfort item with them    [] A caregiver or family member must remain with the patient during their stay if they are mentally handicapped, have dementia, disoriented or unable to use a call light or would be a safety concern if left unattended    [x] Please notify surgeon if you develop any illness between now and time of surgery (cold, cough, sore throat, fever, nausea, vomiting) or any signs of infections  including skin, wounds, and dental.    []  The Outpatient Pharmacy is available to fill your prescription here on your day of surgery, ask your preop nurse for details    [] Other instructions    EDUCATIONAL MATERIALS PROVIDED:    [] PAT Preoperative Education Packet/Booklet     [] Medication List    [] Transfusion bracelet applied with instructions    [] Shower with soap, lather and rinse well, and use CHG wipes provided the evening before surgery as instructed    [] Incentive spirometer with instructions

## 2024-05-13 ENCOUNTER — HOSPITAL ENCOUNTER (OUTPATIENT)
Age: 84
Setting detail: OUTPATIENT SURGERY
Discharge: HOME | End: 2024-05-13
Attending: STUDENT IN AN ORGANIZED HEALTH CARE EDUCATION/TRAINING PROGRAM | Admitting: STUDENT IN AN ORGANIZED HEALTH CARE EDUCATION/TRAINING PROGRAM
Payer: MEDICARE

## 2024-05-13 ENCOUNTER — ANESTHESIA (OUTPATIENT)
Dept: ENDOSCOPY | Age: 84
End: 2024-05-13
Payer: MEDICARE

## 2024-05-13 ENCOUNTER — TELEPHONE (OUTPATIENT)
Age: 84
End: 2024-05-13

## 2024-05-13 ENCOUNTER — ANESTHESIA EVENT (OUTPATIENT)
Dept: ENDOSCOPY | Age: 84
End: 2024-05-13
Payer: MEDICARE

## 2024-05-13 VITALS
HEART RATE: 62 BPM | WEIGHT: 174 LBS | BODY MASS INDEX: 23.06 KG/M2 | RESPIRATION RATE: 16 BRPM | DIASTOLIC BLOOD PRESSURE: 77 MMHG | SYSTOLIC BLOOD PRESSURE: 153 MMHG | HEIGHT: 73 IN | OXYGEN SATURATION: 95 %

## 2024-05-13 PROBLEM — I85.00 VARICES OF ESOPHAGUS DETERMINED BY ENDOSCOPY (HCC): Status: ACTIVE | Noted: 2024-05-13

## 2024-05-13 PROCEDURE — 3609012300 HC EGD BAND LIGATION ESOPHGEAL/GASTRIC VARICES: Performed by: STUDENT IN AN ORGANIZED HEALTH CARE EDUCATION/TRAINING PROGRAM

## 2024-05-13 PROCEDURE — 3700000000 HC ANESTHESIA ATTENDED CARE: Performed by: STUDENT IN AN ORGANIZED HEALTH CARE EDUCATION/TRAINING PROGRAM

## 2024-05-13 PROCEDURE — 2580000003 HC RX 258

## 2024-05-13 PROCEDURE — 7100000010 HC PHASE II RECOVERY - FIRST 15 MIN: Performed by: STUDENT IN AN ORGANIZED HEALTH CARE EDUCATION/TRAINING PROGRAM

## 2024-05-13 PROCEDURE — 2720000010 HC SURG SUPPLY STERILE: Performed by: STUDENT IN AN ORGANIZED HEALTH CARE EDUCATION/TRAINING PROGRAM

## 2024-05-13 PROCEDURE — 43244 EGD VARICES LIGATION: CPT | Performed by: STUDENT IN AN ORGANIZED HEALTH CARE EDUCATION/TRAINING PROGRAM

## 2024-05-13 PROCEDURE — 3700000001 HC ADD 15 MINUTES (ANESTHESIA): Performed by: STUDENT IN AN ORGANIZED HEALTH CARE EDUCATION/TRAINING PROGRAM

## 2024-05-13 PROCEDURE — 6360000002 HC RX W HCPCS

## 2024-05-13 PROCEDURE — 2709999900 HC NON-CHARGEABLE SUPPLY: Performed by: STUDENT IN AN ORGANIZED HEALTH CARE EDUCATION/TRAINING PROGRAM

## 2024-05-13 PROCEDURE — 7100000011 HC PHASE II RECOVERY - ADDTL 15 MIN: Performed by: STUDENT IN AN ORGANIZED HEALTH CARE EDUCATION/TRAINING PROGRAM

## 2024-05-13 RX ORDER — PROPOFOL 10 MG/ML
INJECTION, EMULSION INTRAVENOUS PRN
Status: DISCONTINUED | OUTPATIENT
Start: 2024-05-13 | End: 2024-05-13 | Stop reason: SDUPTHER

## 2024-05-13 RX ORDER — NADOLOL 20 MG/1
20 TABLET ORAL DAILY
Qty: 90 TABLET | Refills: 11 | Status: SHIPPED | OUTPATIENT
Start: 2024-05-13 | End: 2024-05-13

## 2024-05-13 RX ORDER — ONDANSETRON 4 MG/1
4 TABLET, ORALLY DISINTEGRATING ORAL EVERY 8 HOURS PRN
Status: CANCELLED | OUTPATIENT
Start: 2024-05-13

## 2024-05-13 RX ORDER — ONDANSETRON 2 MG/ML
4 INJECTION INTRAMUSCULAR; INTRAVENOUS EVERY 6 HOURS PRN
Status: CANCELLED | OUTPATIENT
Start: 2024-05-13

## 2024-05-13 RX ORDER — SODIUM CHLORIDE 9 MG/ML
INJECTION, SOLUTION INTRAVENOUS PRN
Status: CANCELLED | OUTPATIENT
Start: 2024-05-13

## 2024-05-13 RX ORDER — SODIUM CHLORIDE 0.9 % (FLUSH) 0.9 %
5-40 SYRINGE (ML) INJECTION PRN
Status: CANCELLED | OUTPATIENT
Start: 2024-05-13

## 2024-05-13 RX ORDER — SODIUM CHLORIDE 9 MG/ML
INJECTION, SOLUTION INTRAVENOUS CONTINUOUS PRN
Status: DISCONTINUED | OUTPATIENT
Start: 2024-05-13 | End: 2024-05-13 | Stop reason: SDUPTHER

## 2024-05-13 RX ORDER — SODIUM CHLORIDE 0.9 % (FLUSH) 0.9 %
5-40 SYRINGE (ML) INJECTION EVERY 12 HOURS SCHEDULED
Status: CANCELLED | OUTPATIENT
Start: 2024-05-13

## 2024-05-13 RX ADMIN — SODIUM CHLORIDE: 9 INJECTION, SOLUTION INTRAVENOUS at 07:40

## 2024-05-13 RX ADMIN — PROPOFOL 140 MG: 10 INJECTION, EMULSION INTRAVENOUS at 08:33

## 2024-05-13 ASSESSMENT — PAIN SCALES - GENERAL: PAINLEVEL_OUTOF10: 0

## 2024-05-13 NOTE — TELEPHONE ENCOUNTER
Wife called after procedure was completed today. He was prescribed Nadalol. When he took this previously he had his heart drop down to the 30's per his wife. She said he was hospitalized for this incident. Will let Dr. Lopez now in case there is something else he wants to order. Electronically signed by AARON RUIZ LPN on 5/13/2024 at 3:19 PM

## 2024-05-13 NOTE — H&P
[facility]   Gastroenterology, Hepatology, &  Advanced Endoscopy    Consult       ASSESSMENT AND PLAN:    83y/M w/ history of cirrhosis and varices requiring banding who presents for further surveillance.     PLAN:  EGD        HISTORY OF PRESENT ILLNESS:      Mr. Sam Jack is an 83y/M w/ history of cirrhosis and varices requiring banding who presents for further surveillance.     Past Medical History:        Diagnosis Date    Arthritis     Knees    Atrial fibrillation (HCC)     Cancer (HCC)     skin cancer; Prostate cancer     CHF (congestive heart failure) (HCC)     Chronic obstructive pulmonary disease (HCC) 07/20/2022    Cirrhosis of liver (HCC)     Gallstone pancreatitis     H/O esophagogastroduodenoscopy 02/2013    dr flores no Hpylori, or Howell's     H/O ETOH abuse     last drink 2005    Herpes zoster 10/07/2023    No blisters or weeping remain as of 10/18/2023 - a few dry scabs remain - completed medication as prescribed    [place] (hard of hearing)     bilat hearing aids    Hypertension     Inguinal hernia     RIGHT    Pacemaker     REMOVED 2018 due to infection     Past Surgical History:        Procedure Laterality Date    ABDOMEN SURGERY      APPENDECTOMY      CARDIAC PACEMAKER REMOVAL  2018    removed dt infection    CATARACT REMOVAL Bilateral     CHOLECYSTECTOMY      COLONOSCOPY  06/11/2016    additional dates: 2003,02/28/2013    ENDOSCOPY, COLON, DIAGNOSTIC      ERCP      and ston removal 03/2007    EYE SURGERY Bilateral     cataracts    HERNIA REPAIR Right 10/23/2023    LAPAROSCOPIC ROBOTIC XI ASSISTED RIGHT INGUINAL HERNIA REPAIR WITH MESH performed by Carlos Best MD at [facility] OR    HIP SURGERY Left 04/24/2020    HIP HEMIARTHROPLASTY -- HILARY [place] [facility] [place]    OTHER SURGICAL HISTORY      cardioversion    PACEMAKER PLACEMENT      LASER LEAD EXTRACTION  03/02/2018    SKIN BIOPSY      TONSILLECTOMY      UPPER GASTROINTESTINAL ENDOSCOPY N/A 2/13/2023    EGD BAND

## 2024-05-13 NOTE — DISCHARGE INSTRUCTIONS
Recommendations:  -The patient will be observed post procedure until all discharge criteria are met.    -Patient has a contact number available for emergencies.  The signs and symptoms of potential delayed complications were discussed with the patient.    -Return to normal activities tomorrow.    -Written discharge instructions were provided to the patient.    -Resume anticoagulation tomorrow  -Liquid diet today and tomorrow followed by soft diet and advance as tolerated.   -Clinic appointment scheduled 6/5.

## 2024-05-13 NOTE — ANESTHESIA PRE PROCEDURE
Department of Anesthesiology  Preprocedure Note       Name:  Sam Jack   Age:  83 y.o.  :  1940                                          MRN:  [id number]         Date:  2024      Surgeon: Surgeon(s):  Dyllan Lopez MD    Procedure: Procedure(s):  ESOPHAGOGASTRODUODENOSCOPY    Medications prior to admission:   Prior to Admission medications    Medication Sig Start Date End Date Taking? Authorizing Provider   zinc gluconate 50 MG tablet Take 1 tablet by mouth daily   Yes Mary Joshi MD   spironolactone (ALDACTONE) 25 MG tablet Take 1 tablet by mouth daily 3/11/24   Fabricio Mallory MD   bumetanide (BUMEX) 1 MG tablet Take 1 tablet by mouth daily 24   Fabricio Mallory MD   apixaban (ELIQUIS) 2.5 MG TABS tablet Take 1 tablet by mouth 2 times daily  Patient taking differently: Take 1 tablet by mouth 2 times daily Indications: last dose to be taken 24 per wife 24   Fabricio Mallory MD   potassium bicarbonate (EFFER-K) 25 MEQ disintegrating tablet Take 1 tablet by mouth every other day 24  Fabricio Mallory MD   ursodiol (ACTIGALL) 300 MG capsule Take 1 capsule by mouth 2 times daily 24   Fabricio Mallory MD   buPROPion (WELLBUTRIN SR) 150 MG extended release tablet Take 1 tablet by mouth every morning 23   Fabricio Mallory MD   lactulose (CHRONULAC) 10 GM/15ML solution Take 15 mLs by mouth as needed (constipation) 10/7/23   Fabricio Mallory MD   omeprazole (PRILOSEC) 40 MG delayed release capsule Take 1 capsule by mouth 2 times daily (before meals) 23   Dyllan Lopez MD   Cholecalciferol (VITAMIN D) 50 MCG (2000 UT) CAPS capsule Take by mouth    Mary Joshi MD   Zinc Sulfate (ZINC 15 PO) Take by mouth    Provider, Historical, MD   Handicap Placard MISC by Does not apply route 5 years. OA as diagnosis 20   Fabricio Mallory MD   Multiple Vitamins-Minerals (THERAPEUTIC MULTIVITAMIN-MINERALS) tablet Take 1 tablet by mouth daily    Mary Joshi

## 2024-05-13 NOTE — ANESTHESIA POSTPROCEDURE EVALUATION
Department of Anesthesiology  Postprocedure Note    Patient: Sam Jack  MRN: [id number]  YOB: 1940  Date of evaluation: 5/13/2024    Procedure Summary       Date: 05/13/24 Room / Location: [address]    Anesthesia Start: 0828 Anesthesia Stop: 0846    Procedure: ESOPHAGOGASTRODUODENOSCOPY BAND LIGATION Diagnosis:       Colonic varices      (Colonic varices [I86.8])    Surgeons: Dyllan Lopez MD Responsible Provider: Ai Montero DO    Anesthesia Type: MAC ASA Status: 4            Anesthesia Type: No value filed.    Emily Phase I: Emily Score: 10    Emily Phase II:      Anesthesia Post Evaluation    Patient location during evaluation: PACU  Patient participation: complete - patient participated  Level of consciousness: awake and alert  Pain score: 0  Airway patency: patent  Nausea & Vomiting: no nausea and no vomiting  Cardiovascular status: blood pressure returned to baseline  Respiratory status: acceptable  Hydration status: euvolemic        No notable events documented.

## 2024-05-20 ENCOUNTER — TELEPHONE (OUTPATIENT)
Dept: FAMILY MEDICINE CLINIC | Age: 84
End: 2024-05-20

## 2024-05-20 ENCOUNTER — OFFICE VISIT (OUTPATIENT)
Dept: FAMILY MEDICINE CLINIC | Age: 84
End: 2024-05-20
Payer: MEDICARE

## 2024-05-20 VITALS
BODY MASS INDEX: 23.64 KG/M2 | TEMPERATURE: 97 F | OXYGEN SATURATION: 97 % | RESPIRATION RATE: 17 BRPM | WEIGHT: 178.4 LBS | HEART RATE: 84 BPM | DIASTOLIC BLOOD PRESSURE: 72 MMHG | SYSTOLIC BLOOD PRESSURE: 114 MMHG | HEIGHT: 73 IN

## 2024-05-20 DIAGNOSIS — J60 COAL WORKERS PNEUMOCONIOSIS (HCC): ICD-10-CM

## 2024-05-20 DIAGNOSIS — K74.60 CIRRHOSIS OF LIVER WITHOUT ASCITES, UNSPECIFIED HEPATIC CIRRHOSIS TYPE (HCC): ICD-10-CM

## 2024-05-20 DIAGNOSIS — F32.89 OTHER DEPRESSION: ICD-10-CM

## 2024-05-20 DIAGNOSIS — I50.22 CHRONIC SYSTOLIC (CONGESTIVE) HEART FAILURE (HCC): ICD-10-CM

## 2024-05-20 DIAGNOSIS — C61 PROSTATE CANCER (HCC): ICD-10-CM

## 2024-05-20 DIAGNOSIS — E78.49 OTHER HYPERLIPIDEMIA: ICD-10-CM

## 2024-05-20 DIAGNOSIS — Z00.00 MEDICARE ANNUAL WELLNESS VISIT, SUBSEQUENT: Primary | ICD-10-CM

## 2024-05-20 DIAGNOSIS — I48.0 PAROXYSMAL ATRIAL FIBRILLATION (HCC): ICD-10-CM

## 2024-05-20 PROCEDURE — 1123F ACP DISCUSS/DSCN MKR DOCD: CPT | Performed by: FAMILY MEDICINE

## 2024-05-20 PROCEDURE — G0439 PPPS, SUBSEQ VISIT: HCPCS | Performed by: FAMILY MEDICINE

## 2024-05-20 PROCEDURE — 3074F SYST BP LT 130 MM HG: CPT | Performed by: FAMILY MEDICINE

## 2024-05-20 PROCEDURE — 3078F DIAST BP <80 MM HG: CPT | Performed by: FAMILY MEDICINE

## 2024-05-20 RX ORDER — URSODIOL 300 MG/1
300 CAPSULE ORAL 2 TIMES DAILY
Qty: 180 CAPSULE | Refills: 1 | Status: SHIPPED | OUTPATIENT
Start: 2024-05-20

## 2024-05-20 RX ORDER — BUPROPION HYDROCHLORIDE 150 MG/1
150 TABLET, EXTENDED RELEASE ORAL EVERY MORNING
Qty: 90 TABLET | Refills: 1 | Status: SHIPPED | OUTPATIENT
Start: 2024-05-20

## 2024-05-20 RX ORDER — BUMETANIDE 1 MG/1
1 TABLET ORAL DAILY
Qty: 90 TABLET | Refills: 1 | Status: SHIPPED | OUTPATIENT
Start: 2024-05-20

## 2024-05-20 ASSESSMENT — ENCOUNTER SYMPTOMS
CONSTIPATION: 1
SINUS PAIN: 0
WHEEZING: 0
PHOTOPHOBIA: 0
ABDOMINAL DISTENTION: 1
SHORTNESS OF BREATH: 1
ABDOMINAL PAIN: 0
BLOOD IN STOOL: 0
EYE REDNESS: 0
SORE THROAT: 0
BACK PAIN: 0
COLOR CHANGE: 0
DIARRHEA: 0
TROUBLE SWALLOWING: 0
VOMITING: 0
COUGH: 0

## 2024-05-20 ASSESSMENT — PATIENT HEALTH QUESTIONNAIRE - PHQ9
SUM OF ALL RESPONSES TO PHQ QUESTIONS 1-9: 8
SUM OF ALL RESPONSES TO PHQ QUESTIONS 1-9: 8
2. FEELING DOWN, DEPRESSED OR HOPELESS: SEVERAL DAYS
10. IF YOU CHECKED OFF ANY PROBLEMS, HOW DIFFICULT HAVE THESE PROBLEMS MADE IT FOR YOU TO DO YOUR WORK, TAKE CARE OF THINGS AT HOME, OR GET ALONG WITH OTHER PEOPLE: NOT DIFFICULT AT ALL
SUM OF ALL RESPONSES TO PHQ QUESTIONS 1-9: 8
1. LITTLE INTEREST OR PLEASURE IN DOING THINGS: SEVERAL DAYS
7. TROUBLE CONCENTRATING ON THINGS, SUCH AS READING THE NEWSPAPER OR WATCHING TELEVISION: NOT AT ALL
6. FEELING BAD ABOUT YOURSELF - OR THAT YOU ARE A FAILURE OR HAVE LET YOURSELF OR YOUR FAMILY DOWN: SEVERAL DAYS
4. FEELING TIRED OR HAVING LITTLE ENERGY: MORE THAN HALF THE DAYS
8. MOVING OR SPEAKING SO SLOWLY THAT OTHER PEOPLE COULD HAVE NOTICED. OR THE OPPOSITE, BEING SO FIGETY OR RESTLESS THAT YOU HAVE BEEN MOVING AROUND A LOT MORE THAN USUAL: NOT AT ALL
5. POOR APPETITE OR OVEREATING: NOT AT ALL
SUM OF ALL RESPONSES TO PHQ9 QUESTIONS 1 & 2: 2
SUM OF ALL RESPONSES TO PHQ QUESTIONS 1-9: 8
9. THOUGHTS THAT YOU WOULD BE BETTER OFF DEAD, OR OF HURTING YOURSELF: NOT AT ALL
3. TROUBLE FALLING OR STAYING ASLEEP: NEARLY EVERY DAY

## 2024-05-20 NOTE — PROGRESS NOTES
Interventions:    Reviewed medications, home hazards, visual acuity, and co-morbidities that can increase risk for falls  Feel again Physical therapy might offer him some improvement in his level of acitivity and independence     Depression:  PHQ-2 Score: 2  PHQ-9 Total Score: 8    Interpretation:  5-9 mild   10-14 moderate   15-19 moderately severe   20-27 severe     Interventions:  Currently on Wellbutrin, Physical therapy order placed as described         Self-assessment of health:  In general, how would you say your health is?: (!) Poor    Interventions:  Wheelchair and walker, frustrated he can't get out and do much. Discussed Physical therapy at some length. Interstingly his most recent ECHO showed an EF of 60% not c/w CHF which he has been diagnosed with. See OV note    General HRA Questions:  Select all that apply: (!) New or Increased Pain, New or Increased Fatigue, Stress    Pain Interventions:  Some arthralgias but pain does not seem to me to be a big issue. Weakness, JIANG, instability seem much more pertinant    Fatigue Interventions:  Patient declined any further interventions or treatment    Stress Interventions:  Patient declined any further interventions or treatment      Activity, Diet, and Weight:  On average, how many days per week do you engage in moderate to strenuous exercise (like a brisk walk)?: 0 days  On average, how many minutes do you engage in exercise at this level?: 0 min    Do you eat balanced/healthy meals regularly?: Yes    Body mass index is 23.54 kg/m².      Inactivity Interventions:  Patient declined any further interventions or treatment          Vision Screen:  Do you have difficulty driving, watching TV, or doing any of your daily activities because of your eyesight?: No  Have you had an eye exam within the past year?: (!) No  No results found.    Interventions:   Patient encouraged to make appointment with their eye specialist     ADL's:   Patient reports needing help

## 2024-05-23 ENCOUNTER — CARE COORDINATION (OUTPATIENT)
Dept: CARE COORDINATION | Age: 84
End: 2024-05-23

## 2024-05-23 NOTE — CARE COORDINATION
GINA contacted patient's wife (June) to offer enrollment for Sam in care coordination.  Care coordination services were explained to June.  She states that she handles all of Sam's needs.  She declines enrollment.

## 2024-05-24 DIAGNOSIS — K74.69 OTHER CIRRHOSIS OF LIVER (HCC): ICD-10-CM

## 2024-05-24 DIAGNOSIS — C61 PROSTATE CANCER (HCC): ICD-10-CM

## 2024-05-24 DIAGNOSIS — E78.49 OTHER HYPERLIPIDEMIA: ICD-10-CM

## 2024-05-24 LAB
ALBUMIN: 3.5 G/DL (ref 3.5–5.2)
ALP BLD-CCNC: 358 U/L (ref 40–129)
ALT SERPL-CCNC: 27 U/L (ref 0–40)
ANION GAP SERPL CALCULATED.3IONS-SCNC: 18 MMOL/L (ref 7–16)
AST SERPL-CCNC: 37 U/L (ref 0–39)
ATYPICAL LYMPHOCYTE ABSOLUTE COUNT: 0.03 K/UL (ref 0–0.46)
ATYPICAL LYMPHOCYTES: 1 % (ref 0–4)
BASOPHILS ABSOLUTE: 0.03 K/UL (ref 0–0.2)
BASOPHILS RELATIVE PERCENT: 1 % (ref 0–2)
BILIRUB SERPL-MCNC: 1.8 MG/DL (ref 0–1.2)
BUN BLDV-MCNC: 32 MG/DL (ref 6–23)
CALCIUM SERPL-MCNC: 9.2 MG/DL (ref 8.6–10.2)
CHLORIDE BLD-SCNC: 106 MMOL/L (ref 98–107)
CHOLESTEROL, TOTAL: 156 MG/DL
CO2: 22 MMOL/L (ref 22–29)
CREAT SERPL-MCNC: 1.8 MG/DL (ref 0.7–1.2)
EOSINOPHILS ABSOLUTE: 0.07 K/UL (ref 0.05–0.5)
EOSINOPHILS RELATIVE PERCENT: 2 % (ref 0–6)
GFR, ESTIMATED: 38 ML/MIN/1.73M2
GLUCOSE BLD-MCNC: 95 MG/DL (ref 74–99)
HCT VFR BLD CALC: 37.8 % (ref 37–54)
HDLC SERPL-MCNC: 45 MG/DL
HEMOGLOBIN: 11 G/DL (ref 12.5–16.5)
INR BLD: 1.3
LDL CHOLESTEROL: 91 MG/DL
LYMPHOCYTES ABSOLUTE: 0.42 K/UL (ref 1.5–4)
LYMPHOCYTES RELATIVE PERCENT: 10 % (ref 20–42)
MCH RBC QN AUTO: 28.3 PG (ref 26–35)
MCHC RBC AUTO-ENTMCNC: 29.1 G/DL (ref 32–34.5)
MCV RBC AUTO: 97.2 FL (ref 80–99.9)
MONOCYTES ABSOLUTE: 0.28 K/UL (ref 0.1–0.95)
MONOCYTES RELATIVE PERCENT: 7 % (ref 2–12)
NEUTROPHILS ABSOLUTE: 3.17 K/UL (ref 1.8–7.3)
NEUTROPHILS RELATIVE PERCENT: 79 % (ref 43–80)
NUCLEATED RED BLOOD CELLS: 1 PER 100 WBC
PDW BLD-RTO: 17.2 % (ref 11.5–15)
PLATELET # BLD: 117 K/UL (ref 130–450)
PMV BLD AUTO: 10.7 FL (ref 7–12)
POTASSIUM SERPL-SCNC: 5.3 MMOL/L (ref 3.5–5)
PROSTATE SPECIFIC ANTIGEN: 3.17 NG/ML (ref 0–4)
PROTHROMBIN TIME: 14 SEC (ref 9.3–12.4)
RBC # BLD: 3.89 M/UL (ref 3.8–5.8)
RBC # BLD: ABNORMAL 10*6/UL
SODIUM BLD-SCNC: 146 MMOL/L (ref 132–146)
TOTAL PROTEIN: 6.9 G/DL (ref 6.4–8.3)
TRIGL SERPL-MCNC: 102 MG/DL
TSH SERPL DL<=0.05 MIU/L-ACNC: 3.53 UIU/ML (ref 0.27–4.2)
VLDLC SERPL CALC-MCNC: 20 MG/DL
WBC # BLD: 4 K/UL (ref 4.5–11.5)

## 2024-06-05 ENCOUNTER — OFFICE VISIT (OUTPATIENT)
Dept: GASTROENTEROLOGY | Age: 84
End: 2024-06-05
Payer: MEDICARE

## 2024-06-05 VITALS
TEMPERATURE: 98 F | DIASTOLIC BLOOD PRESSURE: 60 MMHG | SYSTOLIC BLOOD PRESSURE: 116 MMHG | HEART RATE: 69 BPM | OXYGEN SATURATION: 98 %

## 2024-06-05 DIAGNOSIS — K76.82 HEPATIC ENCEPHALOPATHY (HCC): ICD-10-CM

## 2024-06-05 DIAGNOSIS — I85.00 VARICES OF ESOPHAGUS DETERMINED BY ENDOSCOPY (HCC): ICD-10-CM

## 2024-06-05 DIAGNOSIS — K75.81 LIVER CIRRHOSIS SECONDARY TO NASH (HCC): Primary | ICD-10-CM

## 2024-06-05 DIAGNOSIS — K74.60 LIVER CIRRHOSIS SECONDARY TO NASH (HCC): Primary | ICD-10-CM

## 2024-06-05 DIAGNOSIS — K76.9 LIVER LESION: ICD-10-CM

## 2024-06-05 DIAGNOSIS — N18.30 STAGE 3 CHRONIC KIDNEY DISEASE, UNSPECIFIED WHETHER STAGE 3A OR 3B CKD (HCC): ICD-10-CM

## 2024-06-05 DIAGNOSIS — R18.8 ASCITES OF LIVER: ICD-10-CM

## 2024-06-05 PROCEDURE — 3074F SYST BP LT 130 MM HG: CPT | Performed by: STUDENT IN AN ORGANIZED HEALTH CARE EDUCATION/TRAINING PROGRAM

## 2024-06-05 PROCEDURE — 3078F DIAST BP <80 MM HG: CPT | Performed by: STUDENT IN AN ORGANIZED HEALTH CARE EDUCATION/TRAINING PROGRAM

## 2024-06-05 PROCEDURE — 99213 OFFICE O/P EST LOW 20 MIN: CPT | Performed by: STUDENT IN AN ORGANIZED HEALTH CARE EDUCATION/TRAINING PROGRAM

## 2024-06-05 PROCEDURE — 1123F ACP DISCUSS/DSCN MKR DOCD: CPT | Performed by: STUDENT IN AN ORGANIZED HEALTH CARE EDUCATION/TRAINING PROGRAM

## 2024-06-05 NOTE — PROGRESS NOTES
[facility]  Gastroenterology, Hepatology &  Advanced Endoscopy     Progress Note        SUBJECTIVE:      Mr. Sam Jack is an 83y/M w/ history of SHEARER cirrhosis who presents to clinic in follow-up.    MELD 18  HE - Controlled on lactulose.  EV - Recent EGD with one band placed. He does not tolerated NSBB.  Ascites - Controlled on Bumex 1mg / Aldaconte 25mg.  HCC: Most recent US shows 3cm mass lesion most consistent with hemangioma.     He reports that overall he has been doing well. His quality of life has significantly improved following hernia surgery. His most recent labs shows stable Hgb, stable kidney function on diuretics with Cr of 1.8, Tbili stable at 1.8.       OBJECTIVE      Physical    VITALS:  /60   Pulse 69   Temp 98 °F (36.7 °C)   SpO2 98%   Physical Exam:  General: Chronically ill-appearing, NAD  HEENT: PERRLA, EOMI, Anicteric sclera, MMM, no rhinorrhea  Cards: RRR, no LE edema  Resp: Breathing comfortably on room air, good air movement, no use of accessory muscles, no audible wheezing  Abdomen: soft, NT, ND.   Extremities: No effusions or bruising. Deconditioned. In wheelchair.  Skin: No rashes or jaundice  Neuro: A&O x 3, CN grossly intact, non-focal exam       ASSESSMENT AND PLAN      83y/M w/ history of SHEARER cirrhosis who presents to clinic in follow-up.    PLAN:  Cirrhosis:  - Continue to monitor labs q 6 months.     2. Hepatic Encephalopathy:  - Continue lactulose titrated to 2-3 BM daily.    3. Varices:  - One band placed recently.   - Will plan on EGD in 6-12 months.    4. Ascites:  - Currently controlled with diuretic regimen.  - Will continue to closely monitor Cr and adjust accordingly.     5. Liver Lesion on US:  - Unable to have MRI due to metal in body.   - CKD does not allow for use of IV contrast.  - Will schedule CT w/out contrast to ensure no high risk features that are concerning for HCC. Limited evaluation given inability to have MRI or CT with contrast but should

## 2024-06-13 RX ORDER — OMEPRAZOLE 40 MG/1
CAPSULE, DELAYED RELEASE ORAL
Qty: 60 CAPSULE | Refills: 0 | OUTPATIENT
Start: 2024-06-13

## 2024-06-13 RX ORDER — OMEPRAZOLE 40 MG/1
40 CAPSULE, DELAYED RELEASE ORAL
Qty: 60 CAPSULE | Refills: 11 | Status: SHIPPED | OUTPATIENT
Start: 2024-06-13

## 2024-06-13 NOTE — TELEPHONE ENCOUNTER
Wife called to follow up on the refill request. They would like Dr Mallory to take over management of Omeprazole Rx. Patient is out of medication, he did not realize that previous Rx  yesterday.

## 2024-06-13 NOTE — TELEPHONE ENCOUNTER
[facility] pharmacist calling for refill of omeprazole.  Rx was originally prescribed by Dr. Lopez.  Will you take over refills or should patient contact Dr. Lopez's office?  Please advise.  Order pending.

## 2024-06-20 PROBLEM — K75.81 LIVER CIRRHOSIS SECONDARY TO NASH (HCC): Status: ACTIVE | Noted: 2024-06-20

## 2024-06-20 PROBLEM — K74.60 LIVER CIRRHOSIS SECONDARY TO NASH (HCC): Status: ACTIVE | Noted: 2024-06-20

## 2024-06-20 PROBLEM — K76.9 LIVER LESION: Status: ACTIVE | Noted: 2024-06-20

## 2024-07-15 NOTE — PROGRESS NOTES
Informed patients wife, June, that Dr. Ariadne Malcolm reviewed the patients ultrasound. He advised Lactulose 3 times a day along with gas X for the bloating. Patients wife was agreeable. She admits that there is occasional constipation because the diuretics. Lactulose sent to pharmacy. Patient has a follow up on 11/9.
abdominal

## 2024-07-16 ENCOUNTER — TELEPHONE (OUTPATIENT)
Dept: FAMILY MEDICINE CLINIC | Age: 84
End: 2024-07-16

## 2024-07-17 ENCOUNTER — OFFICE VISIT (OUTPATIENT)
Dept: FAMILY MEDICINE CLINIC | Age: 84
End: 2024-07-17
Payer: MEDICARE

## 2024-07-17 VITALS
OXYGEN SATURATION: 99 % | SYSTOLIC BLOOD PRESSURE: 124 MMHG | HEIGHT: 73 IN | RESPIRATION RATE: 17 BRPM | BODY MASS INDEX: 23.96 KG/M2 | WEIGHT: 180.8 LBS | TEMPERATURE: 97.1 F | DIASTOLIC BLOOD PRESSURE: 70 MMHG | HEART RATE: 62 BPM

## 2024-07-17 DIAGNOSIS — I85.00 VARICES OF ESOPHAGUS DETERMINED BY ENDOSCOPY (HCC): ICD-10-CM

## 2024-07-17 DIAGNOSIS — R09.02 HYPOXIA: ICD-10-CM

## 2024-07-17 DIAGNOSIS — I48.0 PAROXYSMAL ATRIAL FIBRILLATION (HCC): Primary | ICD-10-CM

## 2024-07-17 PROCEDURE — 3074F SYST BP LT 130 MM HG: CPT | Performed by: FAMILY MEDICINE

## 2024-07-17 PROCEDURE — 99213 OFFICE O/P EST LOW 20 MIN: CPT | Performed by: FAMILY MEDICINE

## 2024-07-17 PROCEDURE — 3078F DIAST BP <80 MM HG: CPT | Performed by: FAMILY MEDICINE

## 2024-07-17 PROCEDURE — 1123F ACP DISCUSS/DSCN MKR DOCD: CPT | Performed by: FAMILY MEDICINE

## 2024-07-17 NOTE — TELEPHONE ENCOUNTER
Kristal was informed and gave a verbal understanding.   Kristal stated that she will bring him into the office today to get evaluated by Dr. Mallory.

## 2024-07-29 ENCOUNTER — TELEPHONE (OUTPATIENT)
Dept: FAMILY MEDICINE CLINIC | Age: 84
End: 2024-07-29

## 2024-07-29 DIAGNOSIS — I50.42 CHRONIC COMBINED SYSTOLIC AND DIASTOLIC CONGESTIVE HEART FAILURE (HCC): Primary | ICD-10-CM

## 2024-07-29 NOTE — TELEPHONE ENCOUNTER
Kristal called, she had Sam at [facility] ED 7/28/24. The ER doctor wanted to admit him, but the hospitalist suggested he be discharged home, increase Bumex from 1mg to 2mg and have repeat labs.    They did not provide a lab order. Kristal would like to know if you can place an order and tell them when to have it drawn. States that he has a follow up w/ cardiology in 2 weeks and is scheduled to see you 8/20.    Can you placed lab orders? When do you want patient to have lab work done?

## 2024-07-30 DIAGNOSIS — K74.60 LIVER CIRRHOSIS SECONDARY TO NASH (HCC): ICD-10-CM

## 2024-07-30 DIAGNOSIS — K75.81 LIVER CIRRHOSIS SECONDARY TO NASH (HCC): ICD-10-CM

## 2024-07-30 NOTE — TELEPHONE ENCOUNTER
Kristal advised of orders placed. Will get them drawn next week so we can then fax results to Dr. Calderon, prior to 08/13 appt with him.

## 2024-08-05 DIAGNOSIS — I50.42 CHRONIC COMBINED SYSTOLIC AND DIASTOLIC CONGESTIVE HEART FAILURE (HCC): ICD-10-CM

## 2024-08-05 LAB
ANION GAP SERPL CALCULATED.3IONS-SCNC: 16 MMOL/L (ref 7–16)
BUN BLDV-MCNC: 33 MG/DL (ref 6–23)
CALCIUM SERPL-MCNC: 9 MG/DL (ref 8.6–10.2)
CHLORIDE BLD-SCNC: 101 MMOL/L (ref 98–107)
CO2: 24 MMOL/L (ref 22–29)
CREAT SERPL-MCNC: 1.9 MG/DL (ref 0.7–1.2)
GFR, ESTIMATED: 35 ML/MIN/1.73M2
GLUCOSE BLD-MCNC: 93 MG/DL (ref 74–99)
POTASSIUM SERPL-SCNC: 4.6 MMOL/L (ref 3.5–5)
PRO-BNP: 1816 PG/ML (ref 0–450)
SODIUM BLD-SCNC: 141 MMOL/L (ref 132–146)

## 2024-08-14 ENCOUNTER — TELEPHONE (OUTPATIENT)
Dept: FAMILY MEDICINE CLINIC | Age: 84
End: 2024-08-14

## 2024-08-14 NOTE — TELEPHONE ENCOUNTER
Linda from [facility] calling to see if you will follow patient for PT, OT, and nursing?  Please advise.

## 2024-08-20 ENCOUNTER — TELEPHONE (OUTPATIENT)
Dept: FAMILY MEDICINE CLINIC | Age: 84
End: 2024-08-20

## 2024-08-20 ENCOUNTER — OFFICE VISIT (OUTPATIENT)
Dept: FAMILY MEDICINE CLINIC | Age: 84
End: 2024-08-20

## 2024-08-20 VITALS
RESPIRATION RATE: 17 BRPM | HEIGHT: 73 IN | HEART RATE: 55 BPM | OXYGEN SATURATION: 95 % | TEMPERATURE: 97.5 F | SYSTOLIC BLOOD PRESSURE: 100 MMHG | DIASTOLIC BLOOD PRESSURE: 62 MMHG | BODY MASS INDEX: 22.11 KG/M2 | WEIGHT: 166.8 LBS

## 2024-08-20 DIAGNOSIS — K70.31 ALCOHOLIC CIRRHOSIS OF LIVER WITH ASCITES (HCC): ICD-10-CM

## 2024-08-20 DIAGNOSIS — R63.0 ANOREXIA: ICD-10-CM

## 2024-08-20 DIAGNOSIS — I27.20 PULMONARY HYPERTENSION (HCC): ICD-10-CM

## 2024-08-20 DIAGNOSIS — Z09 HOSPITAL DISCHARGE FOLLOW-UP: Primary | ICD-10-CM

## 2024-08-20 LAB
ALBUMIN: 3 G/DL (ref 3.5–5.2)
ALP BLD-CCNC: 269 U/L (ref 40–129)
ALT SERPL-CCNC: 20 U/L (ref 0–40)
ANION GAP SERPL CALCULATED.3IONS-SCNC: 14 MMOL/L (ref 7–16)
AST SERPL-CCNC: 29 U/L (ref 0–39)
BASOPHILS ABSOLUTE: 0.03 K/UL (ref 0–0.2)
BASOPHILS RELATIVE PERCENT: 1 % (ref 0–2)
BILIRUB SERPL-MCNC: 1.5 MG/DL (ref 0–1.2)
BUN BLDV-MCNC: 40 MG/DL (ref 6–23)
CALCIUM SERPL-MCNC: 9 MG/DL (ref 8.6–10.2)
CHLORIDE BLD-SCNC: 96 MMOL/L (ref 98–107)
CO2: 26 MMOL/L (ref 22–29)
CREAT SERPL-MCNC: 2.1 MG/DL (ref 0.7–1.2)
EOSINOPHILS ABSOLUTE: 0.04 K/UL (ref 0.05–0.5)
EOSINOPHILS RELATIVE PERCENT: 1 % (ref 0–6)
GFR, ESTIMATED: 30 ML/MIN/1.73M2
GLUCOSE BLD-MCNC: 97 MG/DL (ref 74–99)
HCT VFR BLD CALC: 38.2 % (ref 37–54)
HEMOGLOBIN: 11.7 G/DL (ref 12.5–16.5)
IMMATURE GRANULOCYTES %: 0 % (ref 0–5)
IMMATURE GRANULOCYTES ABSOLUTE: <0.03 K/UL (ref 0–0.58)
LYMPHOCYTES ABSOLUTE: 0.62 K/UL (ref 1.5–4)
LYMPHOCYTES RELATIVE PERCENT: 12 % (ref 20–42)
MCH RBC QN AUTO: 29.5 PG (ref 26–35)
MCHC RBC AUTO-ENTMCNC: 30.6 G/DL (ref 32–34.5)
MCV RBC AUTO: 96.5 FL (ref 80–99.9)
MONOCYTES ABSOLUTE: 0.43 K/UL (ref 0.1–0.95)
MONOCYTES RELATIVE PERCENT: 8 % (ref 2–12)
NEUTROPHILS ABSOLUTE: 4.01 K/UL (ref 1.8–7.3)
NEUTROPHILS RELATIVE PERCENT: 78 % (ref 43–80)
PDW BLD-RTO: 18.9 % (ref 11.5–15)
PLATELET # BLD: 181 K/UL (ref 130–450)
PMV BLD AUTO: 10 FL (ref 7–12)
POTASSIUM SERPL-SCNC: 4.8 MMOL/L (ref 3.5–5)
RBC # BLD: 3.96 M/UL (ref 3.8–5.8)
SODIUM BLD-SCNC: 136 MMOL/L (ref 132–146)
TOTAL PROTEIN: 6.8 G/DL (ref 6.4–8.3)
WBC # BLD: 5.2 K/UL (ref 4.5–11.5)

## 2024-08-20 RX ORDER — MIRTAZAPINE 15 MG/1
7.5 TABLET, FILM COATED ORAL NIGHTLY
Qty: 30 TABLET | Refills: 5 | Status: SHIPPED | OUTPATIENT
Start: 2024-08-20

## 2024-08-20 SDOH — ECONOMIC STABILITY: FOOD INSECURITY: WITHIN THE PAST 12 MONTHS, YOU WORRIED THAT YOUR FOOD WOULD RUN OUT BEFORE YOU GOT MONEY TO BUY MORE.: NEVER TRUE

## 2024-08-20 SDOH — ECONOMIC STABILITY: INCOME INSECURITY: HOW HARD IS IT FOR YOU TO PAY FOR THE VERY BASICS LIKE FOOD, HOUSING, MEDICAL CARE, AND HEATING?: NOT HARD AT ALL

## 2024-08-20 SDOH — ECONOMIC STABILITY: FOOD INSECURITY: WITHIN THE PAST 12 MONTHS, THE FOOD YOU BOUGHT JUST DIDN'T LAST AND YOU DIDN'T HAVE MONEY TO GET MORE.: NEVER TRUE

## 2024-08-20 ASSESSMENT — ENCOUNTER SYMPTOMS
SORE THROAT: 0
SHORTNESS OF BREATH: 1
PHOTOPHOBIA: 0
TROUBLE SWALLOWING: 0
ABDOMINAL DISTENTION: 1
COLOR CHANGE: 0
EYE REDNESS: 0
WHEEZING: 0
COUGH: 0
ANAL BLEEDING: 0

## 2024-08-20 NOTE — TELEPHONE ENCOUNTER
Patients wife called requesting local GI referral. They do not want to go to [place].   Can a new referral be completed for patient to see Dr Monson here in [place]?

## 2024-08-20 NOTE — PROGRESS NOTES
today for follow-up from hospital.    Diagnoses and all orders for this visit:    Hospital discharge follow-up  -     AL DISCHARGE MEDS RECONCILED W/ CURRENT OUTPATIENT MED LIST  Will continue meds as is. CHF clinic may be necessary. Has appt withCardiology in October  Pulmonary hypertension (HCC)  Saw Vinay in hospital. 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 Monson, prefers to see  In [place]. Scheduled with Dr. Lopez late September. Will need to see someone sooner rather than later  Anorexia  -     Comprehensive Metabolic Panel; Future  -     CBC with Auto Differential; Future  Mirtazapine 15 mg take 1/2 tab at hs. Needs to avoid salt. Take laculose. Adequate but not large doses of protein recommended        Return in about 1 month (around 9/20/2024).    Electronically signed by Fabricio Mallory MD on 8/20/24 at 11:28 AM EDT

## 2024-09-10 ENCOUNTER — TELEPHONE (OUTPATIENT)
Dept: FAMILY MEDICINE CLINIC | Age: 84
End: 2024-09-10

## (undated) DEVICE — DRIP REDUCTION MANIFOLD

## (undated) DEVICE — BLADE CLIPPER GEN PURP NS

## (undated) DEVICE — 3M™ COBAN™ NL STERILE NON-LATEX SELF-ADHERENT WRAP, 2084S, 4 IN X 5 YD (10 CM X 4,5 M), 18 ROLLS/CASE: Brand: 3M™ COBAN™

## (undated) DEVICE — SNARE ENDOSCP AD L240CM LOOP W10MM SHTH DIA2.4MM RND INSUL

## (undated) DEVICE — SYRINGE 20ML LL S/C 50

## (undated) DEVICE — STRYKER PERFORMANCE SERIES SAGITTAL BLADE: Brand: STRYKER PERFORMANCE SERIES

## (undated) DEVICE — DRAPE,LAP,CHOLE,W/TROUGHS,STERILE: Brand: MEDLINE

## (undated) DEVICE — GLOVE ORANGE PI 8   MSG9080

## (undated) DEVICE — APPLICATOR MEDICATED 26 CC SOLUTION HI LT ORNG CHLORAPREP

## (undated) DEVICE — SHEET,DRAPE,53X77,STERILE: Brand: MEDLINE

## (undated) DEVICE — WARMER SCP 2 ANTIFOG LAP DISP

## (undated) DEVICE — CANNULA SEAL

## (undated) DEVICE — COVER,MAYO STAND,STERILE: Brand: MEDLINE

## (undated) DEVICE — SET ORTHO CENTRAX CUPS

## (undated) DEVICE — DOUBLE BASIN SET: Brand: MEDLINE INDUSTRIES, INC.

## (undated) DEVICE — TOWEL,OR,DSP,ST,BLUE,DLX,10/PK,8PK/CS: Brand: MEDLINE

## (undated) DEVICE — GOWN,BREATHABLE SLV,AURORA,XLG,STRL: Brand: MEDLINE

## (undated) DEVICE — MEGA SUTURECUT ND: Brand: ENDOWRIST

## (undated) DEVICE — LIQUIBAND RAPID ADHESIVE 36/CS 0.8ML: Brand: MEDLINE

## (undated) DEVICE — TEN SHOOTER SAEED MULTI-BAND LIGATOR: Brand: SAEED

## (undated) DEVICE — COLUMN DRAPE

## (undated) DEVICE — INSUFFLATION TUBING SET WITH FILTER, FUNNEL CONNECTOR AND LUER LOCK: Brand: JOSNOE MEDICAL INC

## (undated) DEVICE — SET ORTHO STD STORTSTD1

## (undated) DEVICE — INSUFFLATION NEEDLE TO ESTABLISH PNEUMOPERITONEUM.: Brand: INSUFFLATION NEEDLE

## (undated) DEVICE — 1000 S-DRAPE TOWEL DRAPE 10/BX: Brand: STERI-DRAPE™

## (undated) DEVICE — NEEDLE HYPO 21GA L1.5IN GRN POLYPR HUB S STL REG BVL STR

## (undated) DEVICE — E-Z CLEAN, NON-STICK, PTFE COATED, ELECTROSURGICAL BLADE ELECTRODE, 6.5 INCH (16.5 CM): Brand: MEGADYNE

## (undated) DEVICE — PACK PROCEDURE SURG GEN CUST

## (undated) DEVICE — SURGICAL PROCEDURE PACK BASIC

## (undated) DEVICE — NEEDLE HYPO 25GA L1.5IN BLU POLYPR HUB S STL REG BVL STR

## (undated) DEVICE — BLOCK BITE 60FR RUBBER ADLT DENTAL

## (undated) DEVICE — CLOTH SURG PREP PREOPERATIVE CHLORHEXIDINE GLUC 2% READYPREP

## (undated) DEVICE — MONOPOLAR CURVED SCISSORS: Brand: ENDOWRIST

## (undated) DEVICE — BIT DRL L5IN DIA2.8MM STD ST S STL TWST BUSA

## (undated) DEVICE — BLADE,STAINLESS-STEEL,11,STRL,DISPOSABLE: Brand: MEDLINE

## (undated) DEVICE — GRADUATE TRIANG MEASURE 1000ML BLK PRNT

## (undated) DEVICE — ELECTRODE PT RET AD L9FT HI MOIST COND ADH HYDRGEL CORDED

## (undated) DEVICE — INTENDED FOR TISSUE SEPARATION, AND OTHER PROCEDURES THAT REQUIRE A SHARP SURGICAL BLADE TO PUNCTURE OR CUT.: Brand: BARD-PARKER ® STAINLESS STEEL BLADES

## (undated) DEVICE — SET ORTHO ACCOLADE HEMI HIP BROACH

## (undated) DEVICE — GLOVE SURG SZ 7 L12IN FNGR THK94MIL TRNSLUC YEL LTX HYDRGEL

## (undated) DEVICE — TIP COVER ACCESSORY

## (undated) DEVICE — EXTRAS HIP

## (undated) DEVICE — Z INACTIVE USE 2660664 SOLUTION IRRIG 3000ML 0.9% SOD CHL USP UROMATIC PLAS CONT

## (undated) DEVICE — APPLICATOR PREP 26ML 0.7% IOD POVACRYLEX 74% ISO ALC ST

## (undated) DEVICE — 1LYRTR 16FR10ML 100%SILI SNAP: Brand: MEDLINE INDUSTRIES, INC.

## (undated) DEVICE — GOWN,AURORA,BRTHSLV,2XL,18/CS: Brand: MEDLINE

## (undated) DEVICE — SYRINGE MED 50ML LUERLOCK TIP

## (undated) DEVICE — DRILL SYSTEM 7

## (undated) DEVICE — SET ORTHO ACCOLADE HEMI HIP INSRT

## (undated) DEVICE — KIT,ANTI FOG,W/SPONGE & FLUID,SOFT PACK: Brand: MEDLINE

## (undated) DEVICE — NEEDLE HYPO 18GA L1.5IN PNK POLYPR HUB S STL REG BVL STR

## (undated) DEVICE — FORCE BIPOLAR: Brand: ENDOWRIST

## (undated) DEVICE — PILLOW POS W15XH6XL22IN RASPBERRY FOAM ABD W/ STRP DISP FOR

## (undated) DEVICE — 3M™ IOBAN™ 2 ANTIMICROBIAL INCISE DRAPE 6650EZ: Brand: IOBAN™ 2

## (undated) DEVICE — SPONGE GZ W4XL4IN RAYON POLY CVR W/NONWOVEN FAB STRL 2/PK

## (undated) DEVICE — 3M™ STERI-DRAPE™ U-DRAPE 1015: Brand: STERI-DRAPE™

## (undated) DEVICE — SET ORTHO STD STORTSTD2

## (undated) DEVICE — Z DISCONTINUED USE 2275686 GLOVE SURG SZ 8 L12IN FNGR THK13MIL WHT ISOLEX POLYISOPRENE

## (undated) DEVICE — PATIENT RETURN ELECTRODE, SINGLE-USE, CONTACT QUALITY MONITORING, ADULT, WITH 9FT CORD, FOR PATIENTS WEIGING OVER 33LBS. (15KG): Brand: MEGADYNE

## (undated) DEVICE — HANDPIECE SET WITH BONE CLEANING TIP AND SUCTION TUBE: Brand: INTERPULSE

## (undated) DEVICE — DRESSING,GAUZE,XEROFORM,CURAD,5"X9",ST: Brand: CURAD

## (undated) DEVICE — BLADELESS OBTURATOR: Brand: WECK VISTA

## (undated) DEVICE — SPONGE GZ W4XL4IN RAYON POLY FILL CVR W/ NONWOVEN FAB STERILE

## (undated) DEVICE — ARM DRAPE

## (undated) DEVICE — GOWN,SIRUS,FABRNF,XL,20/CS: Brand: MEDLINE

## (undated) DEVICE — Device

## (undated) DEVICE — TOTAL HIP PK